# Patient Record
Sex: MALE | Race: WHITE | HISPANIC OR LATINO | Employment: OTHER | URBAN - METROPOLITAN AREA
[De-identification: names, ages, dates, MRNs, and addresses within clinical notes are randomized per-mention and may not be internally consistent; named-entity substitution may affect disease eponyms.]

---

## 2017-10-05 ENCOUNTER — TELEPHONE (OUTPATIENT)
Dept: FAMILY MEDICINE | Facility: CLINIC | Age: 62
End: 2017-10-05

## 2017-10-05 NOTE — TELEPHONE ENCOUNTER
----- Message from Roslyn Gresham sent at 10/5/2017 11:49 AM CDT -----  Contact: [phone]/ self   Patient wants to establish care with Dr Tovar and wants to her a referral for hand surgery. Patient would like to be seen sooner than the next available appointment. Please advise.

## 2017-10-09 ENCOUNTER — OFFICE VISIT (OUTPATIENT)
Dept: FAMILY MEDICINE | Facility: CLINIC | Age: 62
End: 2017-10-09
Payer: COMMERCIAL

## 2017-10-09 VITALS
OXYGEN SATURATION: 94 % | WEIGHT: 177.94 LBS | HEIGHT: 66 IN | HEART RATE: 73 BPM | SYSTOLIC BLOOD PRESSURE: 108 MMHG | TEMPERATURE: 98 F | BODY MASS INDEX: 28.6 KG/M2 | DIASTOLIC BLOOD PRESSURE: 72 MMHG

## 2017-10-09 DIAGNOSIS — E78.49 OTHER HYPERLIPIDEMIA: ICD-10-CM

## 2017-10-09 DIAGNOSIS — E11.649 TYPE 2 DIABETES MELLITUS WITH HYPOGLYCEMIA WITHOUT COMA, WITHOUT LONG-TERM CURRENT USE OF INSULIN: ICD-10-CM

## 2017-10-09 DIAGNOSIS — I10 ESSENTIAL HYPERTENSION: ICD-10-CM

## 2017-10-09 DIAGNOSIS — M65.311 TRIGGER FINGER OF RIGHT THUMB: Primary | ICD-10-CM

## 2017-10-09 PROCEDURE — 99999 PR PBB SHADOW E&M-EST. PATIENT-LVL III: CPT | Mod: PBBFAC,,, | Performed by: FAMILY MEDICINE

## 2017-10-09 PROCEDURE — 99203 OFFICE O/P NEW LOW 30 MIN: CPT | Mod: S$GLB,,, | Performed by: FAMILY MEDICINE

## 2017-10-09 RX ORDER — ATORVASTATIN CALCIUM 20 MG/1
1 TABLET, FILM COATED ORAL DAILY
Refills: 4 | COMMUNITY
Start: 2017-09-07 | End: 2017-12-29

## 2017-10-09 RX ORDER — IRBESARTAN 150 MG/1
1 TABLET ORAL DAILY
Refills: 4 | COMMUNITY
Start: 2017-09-28 | End: 2017-12-29 | Stop reason: SDUPTHER

## 2017-10-09 RX ORDER — METFORMIN HYDROCHLORIDE 500 MG/1
1 TABLET, EXTENDED RELEASE ORAL DAILY
Refills: 4 | COMMUNITY
Start: 2017-09-07 | End: 2017-12-29 | Stop reason: SDUPTHER

## 2017-10-09 RX ORDER — IBUPROFEN 800 MG/1
1 TABLET ORAL
Refills: 2 | COMMUNITY
Start: 2017-08-14 | End: 2017-12-29

## 2017-10-09 RX ORDER — ALLOPURINOL 300 MG/1
1 TABLET ORAL DAILY
Refills: 4 | COMMUNITY
Start: 2017-07-18 | End: 2017-12-29 | Stop reason: SDUPTHER

## 2017-10-09 NOTE — PROGRESS NOTES
"Subjective:       Patient ID: Trevor Johnston is a 62 y.o. male.    Chief Complaint: No chief complaint on file.    Patient is presenting with a right thumb issue. He describes it as "trigger finger" based on previous visit with a hand surgeon. He had two steroid injections performed by Dr. Joce Wallace. These have not really helped. He had a previous surgery on his left thumb. This has resolved.       Hand Pain    Incident onset: more than a year ago. The injury mechanism was repetitive motion. Pain location: right thumb. Quality: popping/stuck. The pain does not radiate. The pain is at a severity of 1/10. The pain is mild. Pertinent negatives include no chest pain, muscle weakness or tingling. The symptoms are aggravated by movement. Treatments tried: steroid injections. The treatment provided mild relief.     Review of Systems   Constitutional: Negative for fever.   HENT: Negative for voice change.    Eyes: Negative for visual disturbance.   Respiratory: Negative for cough and shortness of breath.    Cardiovascular: Negative for chest pain.   Gastrointestinal: Negative for abdominal pain, diarrhea, nausea and vomiting.   Genitourinary: Negative for difficulty urinating and dysuria.   Musculoskeletal: Positive for joint swelling. Negative for neck pain and neck stiffness.   Skin: Negative for rash.   Allergic/Immunologic: Negative for immunocompromised state.   Neurological: Negative for tingling and headaches.   Psychiatric/Behavioral: Negative for agitation.           Objective:      Physical Exam   Constitutional: He is oriented to person, place, and time. He appears well-developed and well-nourished.   Eyes: Conjunctivae are normal.   Cardiovascular: Normal rate, regular rhythm and normal heart sounds.    Pulmonary/Chest: Effort normal and breath sounds normal.   Abdominal: Soft. Bowel sounds are normal. He exhibits no distension.   Musculoskeletal:        Right hand: He exhibits tenderness. He exhibits normal " range of motion, no deformity and no swelling. Normal sensation noted. Normal strength noted.        Left hand: He exhibits normal range of motion, no tenderness and no deformity. Normal sensation noted. Normal strength noted.        Hands:  Ok sign, and lumbrical strength appear intact   Neurological: He is alert and oriented to person, place, and time.   Skin: Skin is warm and dry.   Psychiatric: He has a normal mood and affect. His speech is normal and behavior is normal.   Nursing note and vitals reviewed.      Assessment:       1. Trigger finger of right thumb    2. Essential hypertension    3. Type 2 diabetes mellitus with hypoglycemia without coma, without long-term current use of insulin    4. Other hyperlipidemia        Plan:       Trigger finger of right thumb  Has a history of trigger finger  Treated by Dr. Wallace in the past  Needs referral for surgical treatment  Has failed steroid injection treatment  Referral given  Follow up with desired PCP, Dr. Tovar as needed    Essential hypertension  Controlled today  Continue irbesartan    Other hyperlipidemia  Continue lipitor    Type 2 diabetes mellitus with hypoglycemia without coma, without long-term current use of insulin  Continue metformin XR    Warning signs discussed, patient to call with any further issues or worsening of symptoms.

## 2017-10-09 NOTE — PATIENT INSTRUCTIONS
What is Trigger Finger?  Trigger finger is an inflammation of tissue inside your finger or thumb. It is also called tenosynovitis (ten-oh-sin-oh-VY-tis). Tendons (cordlike fibers that attach muscle to bone and allow you to bend the joints) become swollen. So does the synovium (a slick membrane that allows the tendons to move easily). This makes it difficult to straighten the finger or thumb.    Causes  Repeated use of a tool with strong gripping, such as a drill or wrench, can irritate and inflame the tendons and the synovium. It is also more common in certain medical conditions such as rheumatoid arthritis, gout, and diabetes. But often the cause of trigger finger is unknown.  Inside your finger  Tendons connect muscles in your forearm to the bones in your fingers. The tendons in each finger are surrounded by a protective tendon sheath. This sheath is lined with synovium, which produces a fluid that allows the tendons to slide easily when you bend and straighten the finger. If a tendon is irritated, it becomes inflamed.  When a tendon is inflamed  When a tendon is inflamed, it causes the lining of the tendon sheath to swell and thicken. Or the tendon itself may thicken. Then the sheath pinches the tendon, and the tendon can no longer slide easily inside the sheath. When you straighten your finger, the tendon sticks or locks as it tries to squeeze back through the sheath.    Symptoms  The first sign of trigger finger may be pain where the finger or thumb joins the palm. You may also notice some swelling. As the tendon becomes more inflamed, the finger may start to catch when you try to straighten it. When the locked tendon releases, the finger jumps, as if you were releasing the trigger of a gun. This further irritates the tendon, and may set up a cycle of catching and swelling.   Date Last Reviewed: 9/28/2015  © 6851-8397 [employer]. [address] [place] [address], [place] [address]. All rights reserved.  This information is not intended as a substitute for professional medical care. Always follow your healthcare professional's instructions.

## 2017-10-09 NOTE — ASSESSMENT & PLAN NOTE
Has a history of trigger finger  Treated by Dr. Wallace in the past  Needs referral for surgical treatment  Has failed steroid injection treatment  Referral given  Follow up with desired PCP, Dr. Tovar as needed

## 2017-10-18 ENCOUNTER — TELEPHONE (OUTPATIENT)
Dept: FAMILY MEDICINE | Facility: CLINIC | Age: 62
End: 2017-10-18

## 2017-10-18 NOTE — TELEPHONE ENCOUNTER
----- Message from Ros Rodas sent at 10/18/2017 12:02 PM CDT -----  Contact: [phone] self  Patient says he needs a referral to see his eye doctor on 10/28/2017 and requested a call back. Please advise.

## 2017-10-18 NOTE — TELEPHONE ENCOUNTER
Spoke with patient. He is requesting a referral to Ophthamologist Dr. Ayan Meng  [address] [facility]   [address]  [place]  Office [phone]

## 2017-11-01 ENCOUNTER — OFFICE VISIT (OUTPATIENT)
Dept: FAMILY MEDICINE | Facility: CLINIC | Age: 62
End: 2017-11-01
Payer: COMMERCIAL

## 2017-11-01 VITALS
HEART RATE: 83 BPM | OXYGEN SATURATION: 97 % | BODY MASS INDEX: 29.02 KG/M2 | WEIGHT: 180.56 LBS | SYSTOLIC BLOOD PRESSURE: 136 MMHG | DIASTOLIC BLOOD PRESSURE: 80 MMHG | HEIGHT: 66 IN

## 2017-11-01 DIAGNOSIS — L03.032 ABSCESS OR CELLULITIS OF TOE, LEFT: Primary | ICD-10-CM

## 2017-11-01 DIAGNOSIS — I15.2 HYPERTENSION ASSOCIATED WITH DIABETES: ICD-10-CM

## 2017-11-01 DIAGNOSIS — L02.612 ABSCESS OR CELLULITIS OF TOE, LEFT: Primary | ICD-10-CM

## 2017-11-01 DIAGNOSIS — E11.59 HYPERTENSION ASSOCIATED WITH DIABETES: ICD-10-CM

## 2017-11-01 DIAGNOSIS — Z11.59 NEED FOR HEPATITIS C SCREENING TEST: ICD-10-CM

## 2017-11-01 DIAGNOSIS — E78.5 DM TYPE 2 WITH DIABETIC DYSLIPIDEMIA: ICD-10-CM

## 2017-11-01 DIAGNOSIS — E11.69 DM TYPE 2 WITH DIABETIC DYSLIPIDEMIA: ICD-10-CM

## 2017-11-01 PROBLEM — I10 ESSENTIAL HYPERTENSION: Status: RESOLVED | Noted: 2017-10-09 | Resolved: 2017-11-01

## 2017-11-01 PROBLEM — E78.49 OTHER HYPERLIPIDEMIA: Status: RESOLVED | Noted: 2017-10-09 | Resolved: 2017-11-01

## 2017-11-01 PROCEDURE — 99999 PR PBB SHADOW E&M-EST. PATIENT-LVL III: CPT | Mod: PBBFAC,,, | Performed by: FAMILY MEDICINE

## 2017-11-01 PROCEDURE — 99214 OFFICE O/P EST MOD 30 MIN: CPT | Mod: S$GLB,,, | Performed by: FAMILY MEDICINE

## 2017-11-01 PROCEDURE — 99213 OFFICE O/P EST LOW 20 MIN: CPT | Mod: PBBFAC,PO | Performed by: FAMILY MEDICINE

## 2017-11-01 RX ORDER — ONDANSETRON 4 MG/1
TABLET, FILM COATED ORAL
Refills: 0 | COMMUNITY
Start: 2017-10-31 | End: 2017-11-10

## 2017-11-01 RX ORDER — HYDROCODONE BITARTRATE AND ACETAMINOPHEN 5; 325 MG/1; MG/1
TABLET ORAL
Refills: 0 | COMMUNITY
Start: 2017-10-31 | End: 2017-11-10

## 2017-11-01 RX ORDER — SULFAMETHOXAZOLE AND TRIMETHOPRIM 800; 160 MG/1; MG/1
TABLET ORAL
Refills: 0 | COMMUNITY
Start: 2017-10-31 | End: 2017-11-01 | Stop reason: SDUPTHER

## 2017-11-01 RX ORDER — SULFAMETHOXAZOLE AND TRIMETHOPRIM 800; 160 MG/1; MG/1
TABLET ORAL
Qty: 10 TABLET | Refills: 0 | Status: SHIPPED | OUTPATIENT
Start: 2017-11-01 | End: 2017-11-10

## 2017-11-01 NOTE — PROGRESS NOTES
Subjective:       Patient ID: Trevor Johnston is a 62 y.o. male.    Chief Complaint: Cyst (left big toe x 2 days)    HPI   61 yo male pt with HTN, DM, Type 2 and hyperlipidemia presents for an urgent care visit c/o possible left big toe infection. Pt notes swelling and erythema of left big toe x 2 days. Toe is painful to the touch. No trauma to the left foot. Pt cares for his own nails. Denies fever, chills, nausea or vomiting. Pt has soaked foot in Epsom salts and applied hydrogen peroxide and Vicks Vaporub.  Pt s/p right thumb surgery yesterday. Was started on 5 days of Bactrim yesterday.  Pt would like to establish care with Dr. Berrios  Review of Systems   Constitutional: Negative for chills and fever.   Gastrointestinal: Negative for nausea and vomiting.   Skin:        See HPI       Objective:      Physical Exam   Constitutional: He is oriented to person, place, and time. He appears well-developed and well-nourished.   HENT:   Head: Normocephalic and atraumatic.   Neck: Normal range of motion. Neck supple. No JVD present. No thyromegaly present.   Cardiovascular: Normal rate, regular rhythm, normal heart sounds and intact distal pulses.    Pulmonary/Chest: Effort normal and breath sounds normal. He has no wheezes. He has no rales.   Abdominal: Soft. Bowel sounds are normal. He exhibits no mass. There is no tenderness.   Lymphadenopathy:     He has no cervical adenopathy.   Neurological: He is alert and oriented to person, place, and time.   Skin: Skin is warm and dry.   Swelling and erythema at base of left great toenail. Tenderness to palpation. Induration.   Vitals reviewed.      Assessment:       See plan  Plan:       Trevor was seen today for cyst.    Diagnoses and all orders for this visit:    Abscess or cellulitis of toe, left  -     sulfamethoxazole-trimethoprim 800-160mg (BACTRIM DS) 800-160 mg Tab; TK 1 T PO BID WITH FOOD for 10 days  -     Warm soaks. Apply Neosporin.  -     F/U in 1 week with   Agustina    Hypertension associated with diabetes: Stable  -     CBC auto differential; Future  -     Hemoglobin A1c; Future    BMI 29.0-29.9,adult    DM type 2 with diabetic dyslipidemia: Stable  -     Lipid panel; Future  -     Comprehensive metabolic panel; Future    Need for hepatitis C screening test  -     Hepatitis C antibody; Future    F/U in 1 week with Dr. Berrios.

## 2017-11-03 DIAGNOSIS — Z12.11 COLON CANCER SCREENING: ICD-10-CM

## 2017-11-06 ENCOUNTER — TELEPHONE (OUTPATIENT)
Dept: FAMILY MEDICINE | Facility: CLINIC | Age: 62
End: 2017-11-06

## 2017-11-06 NOTE — TELEPHONE ENCOUNTER
----- Message from Chema Berrios DO sent at 11/6/2017  2:26 PM CST -----  Contact: [phone]  Please contact patient and let me know what this is regarding. I last saw him for a trigger finger which is an outpatient procedure.       ----- Message -----  From: Christine Prado MA  Sent: 11/6/2017   2:20 PM  To: Chema Berrios DO    Pls be adv   ----- Message -----  From: Jackie Dickson  Sent: 11/6/2017   2:14 PM  To: Agustina Bear Staff    Patient states he is having surgery in the morning and his referral was not sent, patient is requesting to speak with you asap

## 2017-11-06 NOTE — TELEPHONE ENCOUNTER
Spoke with patient and informed no surg clearance was done in order for patient to have laser eye surgery. Informed patient he wanted a referral to outside ophthalmology and did not report needing any surg clr. Informed patient he has appt already scheduled for Friday and labs. Instructed to keep appt and ekg can be done day of appt with Dr. Berrios. Patient voices understanding.

## 2017-11-06 NOTE — TELEPHONE ENCOUNTER
----- Message from Radha Kelley sent at 11/3/2017  5:15 PM CDT -----  Contact: [phone]/self  Patient requesting to speak with you regarding prescription sulfamethoxazole-trimethoprim 800-160mg (BACTRIM DS) 800-160 mg Tab. Please advise.

## 2017-11-06 NOTE — TELEPHONE ENCOUNTER
Pt notes that he had problems getting his Bactrim from the pharmacy. Pt has received his Bactrim. Pt notes that he lanced the toe and has been doing soaks. Pt states that his toe has been better.

## 2017-11-08 ENCOUNTER — LAB VISIT (OUTPATIENT)
Dept: LAB | Facility: HOSPITAL | Age: 62
End: 2017-11-08
Attending: FAMILY MEDICINE
Payer: COMMERCIAL

## 2017-11-08 DIAGNOSIS — E11.59 HYPERTENSION ASSOCIATED WITH DIABETES: ICD-10-CM

## 2017-11-08 DIAGNOSIS — I15.2 HYPERTENSION ASSOCIATED WITH DIABETES: ICD-10-CM

## 2017-11-08 DIAGNOSIS — Z11.59 NEED FOR HEPATITIS C SCREENING TEST: ICD-10-CM

## 2017-11-08 DIAGNOSIS — E78.5 DM TYPE 2 WITH DIABETIC DYSLIPIDEMIA: ICD-10-CM

## 2017-11-08 DIAGNOSIS — E11.69 DM TYPE 2 WITH DIABETIC DYSLIPIDEMIA: ICD-10-CM

## 2017-11-08 LAB
ALBUMIN SERPL BCP-MCNC: 4 G/DL
ALP SERPL-CCNC: 104 U/L
ALT SERPL W/O P-5'-P-CCNC: 52 U/L
ANION GAP SERPL CALC-SCNC: 7 MMOL/L
AST SERPL-CCNC: 28 U/L
BASOPHILS # BLD AUTO: 0.05 K/UL
BASOPHILS NFR BLD: 0.9 %
BILIRUB SERPL-MCNC: 0.4 MG/DL
BUN SERPL-MCNC: 11 MG/DL
CALCIUM SERPL-MCNC: 9.7 MG/DL
CHLORIDE SERPL-SCNC: 105 MMOL/L
CHOLEST SERPL-MCNC: 135 MG/DL
CHOLEST/HDLC SERPL: 3.8 {RATIO}
CO2 SERPL-SCNC: 27 MMOL/L
CREAT SERPL-MCNC: 1.1 MG/DL
DIFFERENTIAL METHOD: ABNORMAL
EOSINOPHIL # BLD AUTO: 0.2 K/UL
EOSINOPHIL NFR BLD: 3.1 %
ERYTHROCYTE [DISTWIDTH] IN BLOOD BY AUTOMATED COUNT: 12.8 %
EST. GFR  (AFRICAN AMERICAN): >60 ML/MIN/1.73 M^2
EST. GFR  (NON AFRICAN AMERICAN): >60 ML/MIN/1.73 M^2
ESTIMATED AVG GLUCOSE: 126 MG/DL
GLUCOSE SERPL-MCNC: 120 MG/DL
HBA1C MFR BLD HPLC: 6 %
HCT VFR BLD AUTO: 41.5 %
HDLC SERPL-MCNC: 36 MG/DL
HDLC SERPL: 26.7 %
HGB BLD-MCNC: 13.8 G/DL
IMM GRANULOCYTES # BLD AUTO: 0.02 K/UL
IMM GRANULOCYTES NFR BLD AUTO: 0.3 %
LDLC SERPL CALC-MCNC: 66.4 MG/DL
LYMPHOCYTES # BLD AUTO: 2.3 K/UL
LYMPHOCYTES NFR BLD: 39.3 %
MCH RBC QN AUTO: 30.1 PG
MCHC RBC AUTO-ENTMCNC: 33.3 G/DL
MCV RBC AUTO: 91 FL
MONOCYTES # BLD AUTO: 0.6 K/UL
MONOCYTES NFR BLD: 9.6 %
NEUTROPHILS # BLD AUTO: 2.7 K/UL
NEUTROPHILS NFR BLD: 46.8 %
NONHDLC SERPL-MCNC: 99 MG/DL
NRBC BLD-RTO: 0 /100 WBC
PLATELET # BLD AUTO: 234 K/UL
PMV BLD AUTO: 9.7 FL
POTASSIUM SERPL-SCNC: 4.6 MMOL/L
PROT SERPL-MCNC: 7.8 G/DL
RBC # BLD AUTO: 4.58 M/UL
SODIUM SERPL-SCNC: 139 MMOL/L
TRIGL SERPL-MCNC: 163 MG/DL
WBC # BLD AUTO: 5.83 K/UL

## 2017-11-08 PROCEDURE — 80053 COMPREHEN METABOLIC PANEL: CPT

## 2017-11-08 PROCEDURE — 36415 COLL VENOUS BLD VENIPUNCTURE: CPT | Mod: PO

## 2017-11-08 PROCEDURE — 86803 HEPATITIS C AB TEST: CPT

## 2017-11-08 PROCEDURE — 83036 HEMOGLOBIN GLYCOSYLATED A1C: CPT

## 2017-11-08 PROCEDURE — 85025 COMPLETE CBC W/AUTO DIFF WBC: CPT

## 2017-11-08 PROCEDURE — 80061 LIPID PANEL: CPT

## 2017-11-09 LAB — HCV AB SERPL QL IA: NEGATIVE

## 2017-11-10 ENCOUNTER — OFFICE VISIT (OUTPATIENT)
Dept: FAMILY MEDICINE | Facility: CLINIC | Age: 62
End: 2017-11-10
Payer: COMMERCIAL

## 2017-11-10 VITALS
TEMPERATURE: 98 F | WEIGHT: 178 LBS | DIASTOLIC BLOOD PRESSURE: 70 MMHG | HEART RATE: 90 BPM | OXYGEN SATURATION: 99 % | BODY MASS INDEX: 28.61 KG/M2 | HEIGHT: 66 IN | SYSTOLIC BLOOD PRESSURE: 118 MMHG

## 2017-11-10 DIAGNOSIS — H35.30 MACULAR DEGENERATION: ICD-10-CM

## 2017-11-10 DIAGNOSIS — D64.9 MILD ANEMIA: ICD-10-CM

## 2017-11-10 DIAGNOSIS — R79.89 ELEVATED LFTS: ICD-10-CM

## 2017-11-10 DIAGNOSIS — E11.69 DM TYPE 2 WITH DIABETIC DYSLIPIDEMIA: ICD-10-CM

## 2017-11-10 DIAGNOSIS — E78.5 DM TYPE 2 WITH DIABETIC DYSLIPIDEMIA: ICD-10-CM

## 2017-11-10 DIAGNOSIS — Z01.818 PREOPERATIVE CLEARANCE: Primary | ICD-10-CM

## 2017-11-10 PROCEDURE — 99999 PR PBB SHADOW E&M-EST. PATIENT-LVL III: CPT | Mod: PBBFAC,,, | Performed by: FAMILY MEDICINE

## 2017-11-10 PROCEDURE — 93005 ELECTROCARDIOGRAM TRACING: CPT | Mod: S$GLB,,, | Performed by: FAMILY MEDICINE

## 2017-11-10 PROCEDURE — 93005 ELECTROCARDIOGRAM TRACING: CPT | Mod: PBBFAC,PO | Performed by: FAMILY MEDICINE

## 2017-11-10 PROCEDURE — 99213 OFFICE O/P EST LOW 20 MIN: CPT | Mod: PBBFAC,PO,25 | Performed by: FAMILY MEDICINE

## 2017-11-10 PROCEDURE — 93010 ELECTROCARDIOGRAM REPORT: CPT | Mod: S$GLB,,, | Performed by: INTERNAL MEDICINE

## 2017-11-10 PROCEDURE — 99215 OFFICE O/P EST HI 40 MIN: CPT | Mod: S$GLB,,, | Performed by: FAMILY MEDICINE

## 2017-11-10 NOTE — PROGRESS NOTES
" Subjective:      Trevor Johnston is a 62 y.o. male who presents to the office today for a preoperative consultation at the request of surgeon Dr. Meng who plans on performing eye injections. Planned anesthesia: local. The patient has the following known anesthesia issues: none. Patients bleeding risk: no recent abnormal bleeding. Patient does not have objections to receiving blood products if needed.    The following portions of the patient's history were reviewed and updated as appropriate: allergies, current medications, past family history, past medical history, past social history, past surgical history and problem list.    Review of Systems  A comprehensive review of systems was negative.      Objective:      /70 (BP Location: Left arm, Patient Position: Sitting, BP Method: Medium (Manual))   Pulse 90   Temp 97.5 °F (36.4 °C)   Ht 5' 6" (1.676 m)   Wt 80.7 kg (178 lb)   SpO2 99%   BMI 28.73 kg/m²     General Appearance:    Alert, cooperative, no distress, appears stated age   Head:    Normocephalic, without obvious abnormality, atraumatic   Eyes:    PERRL, conjunctiva/corneas clear       Ears:    Normal TM's and external ear canals, both ears   Nose:   Congestion noted   Throat:   Lips, mucosa, and tongue normal; teeth and gums normal   Neck:   Supple, symmetrical, trachea midline, no adenopathy;        thyroid:    Back:     Symmetric, no curvature, ROM normal, no CVA tenderness   Lungs:     Clear to auscultation bilaterally, respirations unlabored   Chest wall:    No tenderness or deformity   Heart:    Regular rate and rhythm, S1 and S2 normal, no murmur, rub   or gallop   Abdomen:     Soft, non-tender, bowel sounds active all four quadrants,     no masses, no organomegaly   Extremities:   Extremities normal, atraumatic, no cyanosis or edema   Pulses:   2+ and symmetric all extremities   Skin:   Skin color, texture, turgor normal, no rashes or lesions   Lymph nodes:   Cervical, supraclavicular, " and axillary nodes normal   Neurologic:   CNII-XII intact. Normal strength, sensation and reflexes       throughout   Cardiographics  ECG: no prior ECG, results pending  Echocardiogram: not done    Imaging  Chest x-ray: not done     Lab Review   Results for orders placed or performed in visit on 11/08/17   Lipid panel   Result Value Ref Range    Cholesterol 135 120 - 199 mg/dL    Triglycerides 163 (H) 30 - 150 mg/dL    HDL 36 (L) 40 - 75 mg/dL    LDL Cholesterol 66.4 63.0 - 159.0 mg/dL    HDL/Chol Ratio 26.7 20.0 - 50.0 %    Total Cholesterol/HDL Ratio 3.8 2.0 - 5.0    Non-HDL Cholesterol 99 mg/dL   Comprehensive metabolic panel   Result Value Ref Range    Sodium 139 136 - 145 mmol/L    Potassium 4.6 3.5 - 5.1 mmol/L    Chloride 105 95 - 110 mmol/L    CO2 27 23 - 29 mmol/L    Glucose 120 (H) 70 - 110 mg/dL    BUN, Bld 11 8 - 23 mg/dL    Creatinine 1.1 0.5 - 1.4 mg/dL    Calcium 9.7 8.7 - 10.5 mg/dL    Total Protein 7.8 6.0 - 8.4 g/dL    Albumin 4.0 3.5 - 5.2 g/dL    Total Bilirubin 0.4 0.1 - 1.0 mg/dL    Alkaline Phosphatase 104 55 - 135 U/L    AST 28 10 - 40 U/L    ALT 52 (H) 10 - 44 U/L    Anion Gap 7 (L) 8 - 16 mmol/L    eGFR if African American >60.0 >60 mL/min/1.73 m^2    eGFR if non African American >60.0 >60 mL/min/1.73 m^2   CBC auto differential   Result Value Ref Range    WBC 5.83 3.90 - 12.70 K/uL    RBC 4.58 (L) 4.60 - 6.20 M/uL    Hemoglobin 13.8 (L) 14.0 - 18.0 g/dL    Hematocrit 41.5 40.0 - 54.0 %    MCV 91 82 - 98 fL    MCH 30.1 27.0 - 31.0 pg    MCHC 33.3 32.0 - 36.0 g/dL    RDW 12.8 11.5 - 14.5 %    Platelets 234 150 - 350 K/uL    MPV 9.7 9.2 - 12.9 fL    Immature Granulocytes 0.3 0.0 - 0.5 %    Gran # 2.7 1.8 - 7.7 K/uL    Immature Grans (Abs) 0.02 0.00 - 0.04 K/uL    Lymph # 2.3 1.0 - 4.8 K/uL    Mono # 0.6 0.3 - 1.0 K/uL    Eos # 0.2 0.0 - 0.5 K/uL    Baso # 0.05 0.00 - 0.20 K/uL    nRBC 0 0 /100 WBC    Gran% 46.8 38.0 - 73.0 %    Lymph% 39.3 18.0 - 48.0 %    Mono% 9.6 4.0 - 15.0 %     Eosinophil% 3.1 0.0 - 8.0 %    Basophil% 0.9 0.0 - 1.9 %    Differential Method Automated    Hemoglobin A1c   Result Value Ref Range    Hemoglobin A1C 6.0 (H) 4.0 - 5.6 %    Estimated Avg Glucose 126 68 - 131 mg/dL   Hepatitis C antibody   Result Value Ref Range    Hepatitis C Ab Negative         Assessment:        62 y.o. male with planned surgery as above.    Known risk factors for perioperative complications: None    Current medications which may produce withdrawal symptoms if withheld perioperatively: none       Plan:      1. Preoperative workup as follows ECG, hemoglobin, hematocrit.  2. Change in medication regimen before surgery: none, continue medication regimen including morning of surgery, with sip of water.  3. Follow up with PCP in 3-4 months.     Preoperative clearance  -     EKG 12-LEAD; Future    Macular degeneration  -     Ambulatory Referral to Ophthalmology    Elevated LFTs  -     Comprehensive metabolic panel; Future; Expected date: 02/10/2018    Mild anemia  -     CBC auto differential; Future; Expected date: 02/10/2018    DM type 2 with diabetic dyslipidemia  -     Hemoglobin A1c; Future; Expected date: 02/10/2018      40 minutes spent with patient, of which >50% was spent on counseling and coordination of care.

## 2017-11-10 NOTE — LETTER
November 10, 2017    Trveor Johnston  [address]  [place] [address]             [facility]  [address]  [place] [address]  Phone: [phone]  Fax: [phone] Dear Mr. Johnston:    Below are the results from your recent visit:    Results for orders placed or performed in visit on 11/08/17   Lipid panel   Result Value Ref Range    Cholesterol 135 120 - 199 mg/dL    Triglycerides 163 (H) 30 - 150 mg/dL    HDL 36 (L) 40 - 75 mg/dL    LDL Cholesterol 66.4 63.0 - 159.0 mg/dL    HDL/Chol Ratio 26.7 20.0 - 50.0 %    Total Cholesterol/HDL Ratio 3.8 2.0 - 5.0    Non-HDL Cholesterol 99 mg/dL   Comprehensive metabolic panel   Result Value Ref Range    Sodium 139 136 - 145 mmol/L    Potassium 4.6 3.5 - 5.1 mmol/L    Chloride 105 95 - 110 mmol/L    CO2 27 23 - 29 mmol/L    Glucose 120 (H) 70 - 110 mg/dL    BUN, Bld 11 8 - 23 mg/dL    Creatinine 1.1 0.5 - 1.4 mg/dL    Calcium 9.7 8.7 - 10.5 mg/dL    Total Protein 7.8 6.0 - 8.4 g/dL    Albumin 4.0 3.5 - 5.2 g/dL    Total Bilirubin 0.4 0.1 - 1.0 mg/dL    Alkaline Phosphatase 104 55 - 135 U/L    AST 28 10 - 40 U/L    ALT 52 (H) 10 - 44 U/L    Anion Gap 7 (L) 8 - 16 mmol/L    eGFR if African American >60.0 >60 mL/min/1.73 m^2    eGFR if non African American >60.0 >60 mL/min/1.73 m^2   CBC auto differential   Result Value Ref Range    WBC 5.83 3.90 - 12.70 K/uL    RBC 4.58 (L) 4.60 - 6.20 M/uL    Hemoglobin 13.8 (L) 14.0 - 18.0 g/dL    Hematocrit 41.5 40.0 - 54.0 %    MCV 91 82 - 98 fL    MCH 30.1 27.0 - 31.0 pg    MCHC 33.3 32.0 - 36.0 g/dL    RDW 12.8 11.5 - 14.5 %    Platelets 234 150 - 350 K/uL    MPV 9.7 9.2 - 12.9 fL    Immature Granulocytes 0.3 0.0 - 0.5 %    Gran # 2.7 1.8 - 7.7 K/uL    Immature Grans (Abs) 0.02 0.00 - 0.04 K/uL    Lymph # 2.3 1.0 - 4.8 K/uL    Mono # 0.6 0.3 - 1.0 K/uL    Eos # 0.2 0.0 - 0.5 K/uL    Baso # 0.05 0.00 - 0.20 K/uL    nRBC 0 0 /100 WBC    Gran% 46.8 38.0 - 73.0 %    Lymph% 39.3 18.0 - 48.0 %    Mono% 9.6 4.0 -  15.0 %    Eosinophil% 3.1 0.0 - 8.0 %    Basophil% 0.9 0.0 - 1.9 %    Differential Method Automated    Hemoglobin A1c   Result Value Ref Range    Hemoglobin A1C 6.0 (H) 4.0 - 5.6 %    Estimated Avg Glucose 126 68 - 131 mg/dL   Hepatitis C antibody   Result Value Ref Range    Hepatitis C Ab Negative      Sincerely,    Chema Berrios DO

## 2017-11-13 ENCOUNTER — TELEPHONE (OUTPATIENT)
Dept: FAMILY MEDICINE | Facility: CLINIC | Age: 62
End: 2017-11-13

## 2017-12-29 ENCOUNTER — CLINICAL SUPPORT (OUTPATIENT)
Dept: FAMILY MEDICINE | Facility: CLINIC | Age: 62
End: 2017-12-29
Payer: OTHER GOVERNMENT

## 2017-12-29 ENCOUNTER — OFFICE VISIT (OUTPATIENT)
Dept: FAMILY MEDICINE | Facility: CLINIC | Age: 62
End: 2017-12-29
Payer: OTHER GOVERNMENT

## 2017-12-29 VITALS
TEMPERATURE: 99 F | SYSTOLIC BLOOD PRESSURE: 112 MMHG | HEIGHT: 66 IN | DIASTOLIC BLOOD PRESSURE: 80 MMHG | HEART RATE: 83 BPM | BODY MASS INDEX: 29.41 KG/M2 | OXYGEN SATURATION: 97 % | WEIGHT: 183 LBS

## 2017-12-29 DIAGNOSIS — N20.0 KIDNEY STONES: ICD-10-CM

## 2017-12-29 DIAGNOSIS — E78.5 HYPERLIPIDEMIA, UNSPECIFIED HYPERLIPIDEMIA TYPE: ICD-10-CM

## 2017-12-29 DIAGNOSIS — E11.59 HYPERTENSION ASSOCIATED WITH DIABETES: ICD-10-CM

## 2017-12-29 DIAGNOSIS — R79.89 ELEVATED LFTS: ICD-10-CM

## 2017-12-29 DIAGNOSIS — E11.65 TYPE 2 DIABETES MELLITUS WITH HYPERGLYCEMIA, WITHOUT LONG-TERM CURRENT USE OF INSULIN: ICD-10-CM

## 2017-12-29 DIAGNOSIS — E11.65 TYPE 2 DIABETES MELLITUS WITH HYPERGLYCEMIA, WITHOUT LONG-TERM CURRENT USE OF INSULIN: Primary | ICD-10-CM

## 2017-12-29 DIAGNOSIS — D64.9 NORMOCYTIC ANEMIA: ICD-10-CM

## 2017-12-29 DIAGNOSIS — I15.2 HYPERTENSION ASSOCIATED WITH DIABETES: ICD-10-CM

## 2017-12-29 PROCEDURE — 99214 OFFICE O/P EST MOD 30 MIN: CPT | Mod: S$PBB,,, | Performed by: FAMILY MEDICINE

## 2017-12-29 PROCEDURE — 92250 FUNDUS PHOTOGRAPHY W/I&R: CPT | Mod: PBBFAC,PO | Performed by: FAMILY MEDICINE

## 2017-12-29 PROCEDURE — 92250 FUNDUS PHOTOGRAPHY W/I&R: CPT | Mod: 50,PBBFAC,PO

## 2017-12-29 PROCEDURE — 99214 OFFICE O/P EST MOD 30 MIN: CPT | Mod: PBBFAC,PO | Performed by: FAMILY MEDICINE

## 2017-12-29 PROCEDURE — 99999 PR PBB SHADOW E&M-EST. PATIENT-LVL IV: CPT | Mod: PBBFAC,,, | Performed by: FAMILY MEDICINE

## 2017-12-29 RX ORDER — ATORVASTATIN CALCIUM 40 MG/1
40 TABLET, FILM COATED ORAL DAILY
Qty: 90 TABLET | Refills: 1 | Status: SHIPPED | OUTPATIENT
Start: 2017-12-29 | End: 2018-01-09 | Stop reason: SDUPTHER

## 2017-12-29 RX ORDER — IRBESARTAN 150 MG/1
150 TABLET ORAL DAILY
Qty: 90 TABLET | Refills: 1 | Status: SHIPPED | OUTPATIENT
Start: 2017-12-29 | End: 2018-01-09 | Stop reason: SDUPTHER

## 2017-12-29 RX ORDER — METFORMIN HYDROCHLORIDE 500 MG/1
500 TABLET, EXTENDED RELEASE ORAL DAILY
Qty: 90 TABLET | Refills: 1 | Status: SHIPPED | OUTPATIENT
Start: 2017-12-29 | End: 2018-01-09 | Stop reason: SDUPTHER

## 2017-12-29 RX ORDER — IBUPROFEN 600 MG/1
600 TABLET ORAL
Qty: 60 TABLET | Refills: 0 | Status: SHIPPED | OUTPATIENT
Start: 2017-12-29 | End: 2018-01-08

## 2017-12-29 RX ORDER — ALLOPURINOL 300 MG/1
300 TABLET ORAL DAILY
Qty: 90 TABLET | Refills: 1 | Status: SHIPPED | OUTPATIENT
Start: 2017-12-29 | End: 2018-01-09 | Stop reason: SDUPTHER

## 2017-12-29 NOTE — PATIENT INSTRUCTIONS
Lifestyle Changes to Control Cholesterol  You can control your cholesterol through diet, exercise, weight management, quitting smoking, stress management, and taking your medicines right. These things can also lower your risk for cardiovascular disease.    Eating healthy  Your healthcare provider will give you information on diet changes you may need to make. Your provider may recommend that you see a registered dietitian for help with diet changes. Changes may include:  · Cutting back on the amount of fat and cholesterol in your meals  · Eating less salt (sodium). This is especially important if you have high blood pressure.  · Eating more fresh vegetables and fruits  · Eating lean proteins such as fish, poultry, beans, and peas  · Eating less red meat and processed meats  · Using low-fat dairy products  · Using vegetable and nut oils in limited amounts  · Limiting how many sweets and processed foods like chips, cookies, and baked goods that you eat   · Limiting how many sugar-sweetened beverages you drink  · Limiting how often you eat out  Getting exercise  Regular exercise is a good way to help your body control cholesterol. Regular exercise can help in many ways. It can:  · Raise your good cholesterol  · Help lower your bad cholesterol  · Let blood flow better through your body  · Give more oxygen to your muscles and tissues  · Help you manage your weight  · Help your heart pump better  · Lower your blood pressure  Your healthcare provider may recommend that you get more physical activity if you haven't been active. Your provider may recommend that you get moderate to vigorous physical activity for at least 40 minutes each day. You should do this for at least 3 to 4 days each week. A few examples of moderate to vigorous activity are:  · Walking at a brisk pace. This is about 3 to 4 miles per hour.  · Jogging or running  · Swimming or water aerobics  · Hiking  · Dancing  · Martial arts  · Tennis  · Riding a  bicycle or stationary bike  · Dancing  Managing your weight  If you are overweight or obese, your healthcare provider will work with you to help you lose weight and lower your BMI (body mass index). Making diet changes and getting more physical activity can help. Changing your diet will help you lose weight more easily than adding exercise.  Quitting smoking  Smoking and other tobacco use can raise cholesterol and make it harder to control. Quitting is tough. But millions of people have given up tobacco for good. You can quit, too! Think about some of the reasons below to quit smoking. Do any of them make you think twice about your smoking habit?  Stop smoking because it:  · Keeps your cholesterol high, even if you make all the other changes youre supposed to  · Damages your body. It especially harms your heart, lungs, skin, and blood vessels.  · Makes you more likely to have a heart attack (acute myocardial infarction), stroke, or cancer  · Stains your teeth  · Makes your skin, clothes, and breath smell bad  · Costs a lot of money  Controlling stress   Learn ways to control stress. This will help you deal with stress in your home and work life. Controlling stress can greatly lower your risk of getting cardiovascular disease.  Making the most of medicines  Healthy eating and exercise are a good start to keeping your cholesterol down. But you may need some extra help from medicine. If your doctor prescribes medicine, be sure to take it exactly as directed. Remember:  · Tell your healthcare provider about all other medicines you take. This includes vitamins and herbs.  · Tell your healthcare provider if you have any side effects after starting to take a medicine. Examples of side effects to watch for include muscle aches, weakness, blurred vision, rust-colored urine, yellowing of eyes or skin (jaundice), and headache.  · Dont skip a dose or stop taking your medicine because you feel better or because  your cholesterol numbers go down. Never stop taking your medicine unless your healthcare provider has told you its OK.  · Ask your healthcare provider if you have any questions about your medicines.  High risk groups  Some people may need to take medicines called statins to control their cholesterol. This is in addition to eating a healthy diet and getting regular exercise.  Statins can help you stay healthy. They can also help prevent a heart attack or stroke. You may need to take a statin if you are in one of these groups:  · Adults who have had a heart attack or stroke. Or adults who have had peripheral vascular disease, a ministroke (transient ischemic attack), or stable or unstable angina. This group also includes people who have had a procedure to restore blood flow through a blocked artery. These procedures include percutaneous coronary intervention, angioplasty, stent, and open-heart bypass surgery.  · Adults who have diabetes. Or adults who are at higher risk of having a heart attack or stroke and have an LDL cholesterol level of 70 to 189 mg/dL  · Adults who are 21 years old or older and have an LDL cholesterol level of 190 mg/dL or higher.  If you are in a high-risk group, talk with your healthcare provider about your treatment goals. Make sure you understand why these goals are important, based on your own health history and your family history of heart disease or high cholesterol.  Make a plan to have regular cholesterol checks. You may need to fast before getting this test. Also ask your provider about any side effects your medicines may cause. Let your provider know about any side effects you have. You may need to take more than one medicine to reach the cholesterol goals that you and your provider decide on.  Date Last Reviewed: 10/1/2016  © 3602-6749 [employer]. [address] [place] [address], [place] [address]. All rights reserved. This information is not intended as a substitute for  professional medical care. Always follow your healthcare professional's instructions.        Understanding Fat and Cholesterol  Too much cholesterol in your blood can lead to many problems such as blocked arteries. This can lead to heart attack and stroke. One of the best ways to manage heart and blood vessel disease is to lower your blood cholesterol. Planning meals that are low in saturated fat and cholesterol helps reduce the level of cholesterol in your blood. Below are eating tips to help lower your blood cholesterol levels.  Eat less fat  A healthy goal is to have less than 25% to 35% of your daily calories come from fat. Instead of fats, eat more fruits, whole-grains, and vegetables. This also helps control your weight, and can even reduce your risk for some cancers. There are different kinds of fats in foods. Fats can be saturated, unsaturated, or trans fats. The best fats to choose are unsaturated fats. But fats are high in calories, so eat even unsaturated fats sparingly.  Limit foods high in saturated fats  Saturated fats come from animals and certain plants (such as coconut and palm). Eating too much saturated fat can raise your blood cholesterol levels and make your artery problems worse. Your goal is to eat less saturated fat. Below are some examples of foods that contain lots of saturated fat:  · Fatty cuts of meat (lamb, ham, beef)  · Many pastries, cakes, cookies, and candies  · Cream, ice cream, sour cream, cheese, and butter, and foods made with them  · Sauces made with butter or cream  · Salad dressings with saturated fats  · Foods that contain palm or coconut oil  Choose unsaturated fats  Unsaturated fats are usually liquid at room temperature. They are better choices for your heart than saturated fat. There are two types of unsaturated fats: polyunsaturated fat and monounsaturated fat. Aim to replace saturated fats with polyunsaturated or monounsaturated fats.  · Polyunsaturated fats are found  in corn oil, safflower oil, sunflower oil, and other vegetable oils.  · Monounsaturated fats are found in olive oil, canola oil, and peanut oil. Some margarines and spreads are now made with these oils, too. Avocados are also high in monounsaturated fat.  Of all fats, monounsaturated fats are the least harmful to your heart.  Avoid trans fats  Like saturated fats, trans fats have been linked to heart disease. Even a small amount can harm your health. Trans fats are found in liquid oils that have been changed to be solid at room temperature. Margarine, which is often made from vegetable oil, is one example. Vegetable shortening is another. Trans fats are often found in packaged goods. Check ingredients for the words hydrogenated or partially hydrogenated. They mean the foods contain trans fat.  What about triglycerides?  Triglycerides are a type of fat in your blood. Like cholesterol, high levels of triglycerides can lead to blocked arteries. High triglyceride levels can be reduced 20% to 50%  by limiting added sugars in your diet, susbstituting healthier fats for saturated and trans fats, getting more physical activity, and losing weight if your are overweight. You may also be advised to avoid or limi alcohol.    Reading food labels  Luckily, most foods now have labels giving you the facts about what youre eating. Reading food labels helps you make healthy choices. Look for the words highlighted below.  · Serving Size. This is the amount of food in 1 serving. If you eat larger portions, be sure to count more of everything: fat, calories, and cholesterol.  · Total Fat. Tells you how many grams (g) of fat are in 1 serving.  · Calories from Fat. This tells you the total number of calories from fat in 1 serving (there are 9 calories per gram of fat). Look for foods with the fewest calories from fat.  · Saturated Fat. Tells you how many grams (g) of saturated fat are in 1 serving.  · Trans Fat. Tells how many grams  (g) of trans fat are in 1 serving.  · Cholesterol. Tells you how many milligrams (mg) of cholesterol are in 1 serving.  Date Last Reviewed: 5/11/2015 © 2000-2017 [employer]. [address] [place] [address], [place] [address]. All rights reserved. This information is not intended as a substitute for professional medical care. Always follow your healthcare professional's instructions.        4 Steps for Eating Healthier  Changing the way you eat can improve your health. It can lower your cholesterol and blood pressure, and help you stay at a healthy weight. Your diet doesnt have to be bland and boring to be healthy. Just watch your calories and follow these steps:    1. Eat fewer unhealthy fats  · Choose more fish and lean meats instead of fatty cuts of meat.  · Skip butter and lard, and use less margarine.  · Pass on foods that have palm, coconut, or hydrogenated oils.  · Eat fewer high-fat dairy foods like cheese, ice cream, and whole milk.  · Get a heart-healthy cookbook and try some low-fat recipes.  2. Go light on salt  · Keep the saltshaker off the table.  · Limit high-salt ingredients, such as soy sauce, bouillon, and garlic salt.  · Instead of adding salt when cooking, season your food with herbs and flavorings. Try lemon, garlic, and onion.  · Limit convenience foods, such as boxed or canned foods and restaurant food.  · Read food labels and choose lower-sodium options.  3. Limit sugar  · Pause before you add sugars to pancakes, cereal, coffee, or tea. This includes white and brown table sugar, syrup, honey, and molasses. Cut your usual amount by half.  · Use non-sugar sweeteners. Stevia, aspartame, and sucralose can satisfy a sweet tooth without adding calories.  · Swap out sugar-filled soda and other drinks. Buy sugar-free or low-calorie beverages. Remember water is always the best choice.  · Read labels and choose foods with less added sugar. Keep in mind that dairy foods and foods with fruit will  have some natural sugar.  · Cut the sugar in recipes by 1/3 to 1/2. Boost the flavor with extracts like almond, vanilla, or orange. Or add spices such as cinnamon or nutmeg.  4. Eat more fiber  · Eat fresh fruits and vegetables every day.  · Boost your diet with whole grains. Go for oats, whole-grain rice, and bran.  · Add beans and lentils to your meals.  · Drink more water to match your fiber increase. This is to help prevent constipation.  Date Last Reviewed: 5/11/2015  © 0777-5722 [employer]. [address] [place] [address], [place] [address]. All rights reserved. This information is not intended as a substitute for professional medical care. Always follow your healthcare professional's instructions.

## 2017-12-29 NOTE — PROGRESS NOTES
Subjective:       Patient ID: Trevor Johnston is a 62 y.o. male.    Chief Complaint: Follow-up (Refills)    62 year old male presents today for follow up on his diabetes, medications, lab results, and medication refills.     His weight has increased slightly over the holidays. He has never seen a diabetic educator. Eye exam is to be done today. Foot exam done today.     BP controlled.     Statin increased to HIS based on HTN and DM.       Diabetes   He presents for his follow-up diabetic visit. He has type 2 diabetes mellitus. The initial diagnosis of diabetes was made 1 year ago. His disease course has been stable. There are no hypoglycemic associated symptoms. Pertinent negatives for hypoglycemia include no headaches or sweats. Pertinent negatives for diabetes include no blurred vision, no chest pain, no foot paresthesias, no foot ulcerations, no polydipsia, no polyphagia, no polyuria, no visual change, no weakness and no weight loss. There are no hypoglycemic complications. Symptoms are stable. Risk factors for coronary artery disease include diabetes mellitus, dyslipidemia, male sex and obesity. Current diabetic treatment includes oral agent (monotherapy). He is compliant with treatment all of the time. When asked about meal planning, he reported none. He has not had a previous visit with a dietitian. He rarely participates in exercise. An ACE inhibitor/angiotensin II receptor blocker is being taken. He does not see a podiatrist.Eye exam is not current.   Hypertension   This is a chronic problem. The current episode started more than 1 year ago. The problem is unchanged. The problem is controlled. Pertinent negatives include no blurred vision, chest pain, headaches, neck pain, palpitations, peripheral edema, shortness of breath or sweats. There are no associated agents to hypertension. Risk factors for coronary artery disease include diabetes mellitus, dyslipidemia, male gender and obesity. Past treatments  include angiotensin blockers. The current treatment provides significant improvement. There are no compliance problems.    Hyperlipidemia   This is a chronic problem. The current episode started more than 1 year ago. The problem is controlled. Pertinent negatives include no chest pain or shortness of breath. Current antihyperlipidemic treatment includes statins. The current treatment provides significant improvement of lipids. There are no compliance problems.      Review of Systems   Constitutional: Negative for weight loss.   Eyes: Negative for blurred vision.   Respiratory: Negative for shortness of breath.    Cardiovascular: Negative for chest pain and palpitations.   Endocrine: Negative for polydipsia, polyphagia and polyuria.   Musculoskeletal: Negative for neck pain.   Neurological: Negative for weakness and headaches.         Results for orders placed or performed in visit on 11/08/17   Lipid panel   Result Value Ref Range    Cholesterol 135 120 - 199 mg/dL    Triglycerides 163 (H) 30 - 150 mg/dL    HDL 36 (L) 40 - 75 mg/dL    LDL Cholesterol 66.4 63.0 - 159.0 mg/dL    HDL/Chol Ratio 26.7 20.0 - 50.0 %    Total Cholesterol/HDL Ratio 3.8 2.0 - 5.0    Non-HDL Cholesterol 99 mg/dL   Hemoglobin A1c   Result Value Ref Range    Hemoglobin A1C 6.0 (H) 4.0 - 5.6 %    Estimated Avg Glucose 126 68 - 131 mg/dL       Objective:     Vitals:    12/29/17 1444   BP: 112/80   Pulse: 83   Temp: 98.6 °F (37 °C)        Physical Exam   Constitutional: He is oriented to person, place, and time. He appears well-developed and well-nourished.   HENT:   Head: Normocephalic and atraumatic.   Eyes: Conjunctivae are normal.   Cardiovascular: Normal rate and regular rhythm.    Pulmonary/Chest: Effort normal and breath sounds normal.   Abdominal: Soft. He exhibits no distension.   Musculoskeletal: He exhibits no edema.        Right foot: There is normal range of motion and no deformity.        Left foot: There is normal range of motion  and no deformity.   Feet:   Right Foot:   Protective Sensation: 10 sites tested. 10 sites sensed.   Left Foot:   Protective Sensation: 10 sites tested. 10 sites sensed.   Neurological: He is alert and oriented to person, place, and time.   Skin: Skin is warm.   Psychiatric: He has a normal mood and affect. His behavior is normal. Judgment and thought content normal.   Nursing note and vitals reviewed.      Assessment:       1. Type 2 diabetes mellitus with hyperglycemia, without long-term current use of insulin    2. Hypertension associated with diabetes    3. Normocytic anemia    4. Kidney stones    5. BMI 29.0-29.9,adult    6. Hyperlipidemia, unspecified hyperlipidemia type    7. Elevated LFTs        Plan:       Type 2 diabetes mellitus with hyperglycemia, without long-term current use of insulin  Diabetes Management Status  Statin: Taking  ACE/ARB: Taking  Screening or Prevention Patient's value Goal Complete/Controlled?   HgA1C Testing and Control   Lab Results   Component Value Date    HGBA1C 6.0 (H) 11/08/2017      Annually/Less than 8% Yes   Lipid profile : 11/08/2017 Annually Yes   LDL control Lab Results   Component Value Date    LDLCALC 66.4 11/08/2017    Annually/Less than 100 mg/dl  Yes   Nephropathy screening No results found for: LABMICR  No results found for: PROTEINUA Annually No   Blood pressure BP Readings from Last 1 Encounters:   12/29/17 112/80    Less than 140/90 Yes   Dilated retinal exam : 12/29/2017 Annually Yes   Foot exam   : 12/29/2017 Annually No   Continue metformin  Diabetic education  Weight loss  Decrease tortillas, carbs, pasta.   A1c and urine microalbumin on 2/1/18  -     metFORMIN (GLUCOPHAGE-XR) 500 MG 24 hr tablet; Take 1 tablet (500 mg total) by mouth once daily.  Dispense: 90 tablet; Refill: 1  -     Ambulatory Referral to Diabetes Education  -     Microalbumin/creatinine urine ratio; Standing  -     Diabetic Eye Screening Photo; Future    Hypertension associated with  diabetes  Continue medication  -     irbesartan (AVAPRO) 150 MG tablet; Take 1 tablet (150 mg total) by mouth once daily.  Dispense: 90 tablet; Refill: 1    Normocytic anemia  Repeat CBC on 2/1/18    Kidney stones  -     allopurinol (ZYLOPRIM) 300 MG tablet; Take 1 tablet (300 mg total) by mouth once daily.  Dispense: 90 tablet; Refill: 1    BMI 29.0-29.9,adult  Diet and exercise counseling performed  Handouts given    Hyperlipidemia, unspecified hyperlipidemia type  Switched to high intensity statin based on history of DM and HTN  -     atorvastatin (LIPITOR) 40 MG tablet; Take 1 tablet (40 mg total) by mouth once daily.  Dispense: 90 tablet; Refill: 1  -     Ambulatory Referral to Diabetes Education    Elevated LFTs  Repeat CMP scheduled for 2/1/18    Other orders  -     ibuprofen (ADVIL,MOTRIN) 600 MG tablet; Take 1 tablet (600 mg total) by mouth 3 (three) times daily with meals.  Dispense: 60 tablet; Refill: 0      Warning signs discussed, patient to call with any further issues or worsening of symptoms.

## 2017-12-29 NOTE — PROGRESS NOTES
Trevor Johnston is a 62 y.o. male here for a diabetic eye screening with non-dilated fundus photos per Dr Pearce    Patient cooperative?: Yes  Small pupils?: Yes  Last eye exam: 02/2017    For exam results, see Encounter Report.

## 2018-01-02 ENCOUNTER — TELEPHONE (OUTPATIENT)
Dept: FAMILY MEDICINE | Facility: CLINIC | Age: 63
End: 2018-01-02

## 2018-01-02 DIAGNOSIS — E11.65 TYPE 2 DIABETES MELLITUS WITH HYPERGLYCEMIA, WITHOUT LONG-TERM CURRENT USE OF INSULIN: Primary | ICD-10-CM

## 2018-01-02 PROCEDURE — 92250 FUNDUS PHOTOGRAPHY W/I&R: CPT | Mod: 26,S$PBB,, | Performed by: OPTOMETRIST

## 2018-01-02 NOTE — TELEPHONE ENCOUNTER
Spoke with patient about Opthalmology appointment.  Patient states he already have an Opthalmology Dr Florin Dotson and that Dr Dotson referred him to Dr Ayan Covarrubias (Retina Specialist) [phone] fax , which he seen today 01/02/18 for an Occular injection.  A release of medical records will need to be sing to get this records.  Patient will come to office to sign a release.  Patient verbalized understanding on instruction given.

## 2018-01-02 NOTE — TELEPHONE ENCOUNTER
They were unable to get clear image on the eye camera. I have placed a referral to opthalmology. Please call and let patient know.

## 2018-01-09 DIAGNOSIS — E78.5 HYPERLIPIDEMIA, UNSPECIFIED HYPERLIPIDEMIA TYPE: ICD-10-CM

## 2018-01-09 DIAGNOSIS — E11.59 HYPERTENSION ASSOCIATED WITH DIABETES: ICD-10-CM

## 2018-01-09 DIAGNOSIS — E11.65 TYPE 2 DIABETES MELLITUS WITH HYPERGLYCEMIA, WITHOUT LONG-TERM CURRENT USE OF INSULIN: ICD-10-CM

## 2018-01-09 DIAGNOSIS — N20.0 KIDNEY STONES: ICD-10-CM

## 2018-01-09 DIAGNOSIS — I15.2 HYPERTENSION ASSOCIATED WITH DIABETES: ICD-10-CM

## 2018-01-09 RX ORDER — IRBESARTAN 150 MG/1
150 TABLET ORAL DAILY
Qty: 90 TABLET | Refills: 1 | Status: SHIPPED | OUTPATIENT
Start: 2018-01-09 | End: 2018-04-03 | Stop reason: SDUPTHER

## 2018-01-09 RX ORDER — METFORMIN HYDROCHLORIDE 500 MG/1
500 TABLET, EXTENDED RELEASE ORAL DAILY
Qty: 90 TABLET | Refills: 1 | Status: SHIPPED | OUTPATIENT
Start: 2018-01-09 | End: 2018-04-03 | Stop reason: SDUPTHER

## 2018-01-09 RX ORDER — ATORVASTATIN CALCIUM 40 MG/1
40 TABLET, FILM COATED ORAL DAILY
Qty: 90 TABLET | Refills: 1 | Status: SHIPPED | OUTPATIENT
Start: 2018-01-09 | End: 2018-04-03 | Stop reason: SDUPTHER

## 2018-01-09 RX ORDER — ALLOPURINOL 300 MG/1
300 TABLET ORAL DAILY
Qty: 90 TABLET | Refills: 1 | Status: SHIPPED | OUTPATIENT
Start: 2018-01-09 | End: 2018-04-03 | Stop reason: SDUPTHER

## 2018-01-09 NOTE — TELEPHONE ENCOUNTER
----- Message from Elizabeth Aleman sent at 1/9/2018  4:40 PM CST -----  Contact: [phone]/self  Patient requesting to speak with you regarding refills for:    metFORMIN (GLUCOPHAGE-XR) 500 MG 24 hr tablet  allopurinol (ZYLOPRIM) 300 MG tablet  irbesartan (AVAPRO) 150 MG tablet  atorvastatin (LIPITOR) 40 MG tablet    Please call and advise.

## 2018-01-15 ENCOUNTER — LAB VISIT (OUTPATIENT)
Dept: LAB | Facility: HOSPITAL | Age: 63
End: 2018-01-15
Attending: FAMILY MEDICINE

## 2018-01-15 DIAGNOSIS — Z12.11 COLON CANCER SCREENING: ICD-10-CM

## 2018-01-15 LAB — HEMOCCULT STL QL IA: NEGATIVE

## 2018-01-15 PROCEDURE — 82274 ASSAY TEST FOR BLOOD FECAL: CPT

## 2018-02-01 ENCOUNTER — LAB VISIT (OUTPATIENT)
Dept: LAB | Facility: HOSPITAL | Age: 63
End: 2018-02-01
Attending: FAMILY MEDICINE
Payer: OTHER GOVERNMENT

## 2018-02-01 DIAGNOSIS — E11.65 TYPE 2 DIABETES MELLITUS WITH HYPERGLYCEMIA, WITHOUT LONG-TERM CURRENT USE OF INSULIN: ICD-10-CM

## 2018-02-01 DIAGNOSIS — E78.5 DM TYPE 2 WITH DIABETIC DYSLIPIDEMIA: ICD-10-CM

## 2018-02-01 DIAGNOSIS — E11.69 DM TYPE 2 WITH DIABETIC DYSLIPIDEMIA: ICD-10-CM

## 2018-02-01 DIAGNOSIS — R79.89 ELEVATED LFTS: ICD-10-CM

## 2018-02-01 DIAGNOSIS — D64.9 MILD ANEMIA: ICD-10-CM

## 2018-02-01 LAB
ALBUMIN SERPL BCP-MCNC: 4.1 G/DL
ALP SERPL-CCNC: 81 U/L
ALT SERPL W/O P-5'-P-CCNC: 37 U/L
ANION GAP SERPL CALC-SCNC: 8 MMOL/L
AST SERPL-CCNC: 22 U/L
BASOPHILS # BLD AUTO: 0.05 K/UL
BASOPHILS NFR BLD: 0.7 %
BILIRUB SERPL-MCNC: 0.5 MG/DL
BUN SERPL-MCNC: 16 MG/DL
CALCIUM SERPL-MCNC: 9.8 MG/DL
CHLORIDE SERPL-SCNC: 103 MMOL/L
CO2 SERPL-SCNC: 26 MMOL/L
CREAT SERPL-MCNC: 0.8 MG/DL
DIFFERENTIAL METHOD: ABNORMAL
EOSINOPHIL # BLD AUTO: 0.2 K/UL
EOSINOPHIL NFR BLD: 2.6 %
ERYTHROCYTE [DISTWIDTH] IN BLOOD BY AUTOMATED COUNT: 12.7 %
EST. GFR  (AFRICAN AMERICAN): >60 ML/MIN/1.73 M^2
EST. GFR  (NON AFRICAN AMERICAN): >60 ML/MIN/1.73 M^2
ESTIMATED AVG GLUCOSE: 134 MG/DL
GLUCOSE SERPL-MCNC: 121 MG/DL
HBA1C MFR BLD HPLC: 6.3 %
HCT VFR BLD AUTO: 40.8 %
HGB BLD-MCNC: 13.4 G/DL
IMM GRANULOCYTES # BLD AUTO: 0.03 K/UL
IMM GRANULOCYTES NFR BLD AUTO: 0.4 %
LYMPHOCYTES # BLD AUTO: 2.3 K/UL
LYMPHOCYTES NFR BLD: 29.4 %
MCH RBC QN AUTO: 30.2 PG
MCHC RBC AUTO-ENTMCNC: 32.8 G/DL
MCV RBC AUTO: 92 FL
MONOCYTES # BLD AUTO: 0.7 K/UL
MONOCYTES NFR BLD: 9.3 %
NEUTROPHILS # BLD AUTO: 4.4 K/UL
NEUTROPHILS NFR BLD: 57.6 %
NRBC BLD-RTO: 0 /100 WBC
PLATELET # BLD AUTO: 266 K/UL
PMV BLD AUTO: 10 FL
POTASSIUM SERPL-SCNC: 4.3 MMOL/L
PROT SERPL-MCNC: 7.7 G/DL
RBC # BLD AUTO: 4.43 M/UL
SODIUM SERPL-SCNC: 137 MMOL/L
WBC # BLD AUTO: 7.66 K/UL

## 2018-02-01 PROCEDURE — 36415 COLL VENOUS BLD VENIPUNCTURE: CPT | Mod: PO

## 2018-02-01 PROCEDURE — 83036 HEMOGLOBIN GLYCOSYLATED A1C: CPT

## 2018-02-01 PROCEDURE — 85025 COMPLETE CBC W/AUTO DIFF WBC: CPT

## 2018-02-01 PROCEDURE — 80053 COMPREHEN METABOLIC PANEL: CPT

## 2018-02-08 ENCOUNTER — TELEPHONE (OUTPATIENT)
Dept: FAMILY MEDICINE | Facility: CLINIC | Age: 63
End: 2018-02-08

## 2018-02-08 DIAGNOSIS — E11.65 TYPE 2 DIABETES MELLITUS WITH HYPERGLYCEMIA, WITHOUT LONG-TERM CURRENT USE OF INSULIN: Primary | ICD-10-CM

## 2018-02-08 PROBLEM — E11.649 TYPE 2 DIABETES MELLITUS WITH HYPOGLYCEMIA WITHOUT COMA, WITHOUT LONG-TERM CURRENT USE OF INSULIN: Status: RESOLVED | Noted: 2017-10-09 | Resolved: 2018-02-08

## 2018-02-08 NOTE — TELEPHONE ENCOUNTER
Done. Thank you.    Chema    ----- Message from Cathleen Sanchez MA sent at 2/8/2018  4:04 PM CST -----      ----- Message -----  From: Yandy Lisa  Sent: 2/8/2018   3:59 PM  To: [facility] Staff    Good afternoon,  The patient's diabetes education order was denied by Nisa. Apparently we have the wrong ID number for him. Can this be reordered so that a new authorization can be processed?     Thanks

## 2018-02-14 ENCOUNTER — OFFICE VISIT (OUTPATIENT)
Dept: FAMILY MEDICINE | Facility: CLINIC | Age: 63
End: 2018-02-14
Payer: OTHER GOVERNMENT

## 2018-02-14 VITALS
HEART RATE: 92 BPM | SYSTOLIC BLOOD PRESSURE: 120 MMHG | BODY MASS INDEX: 29.72 KG/M2 | OXYGEN SATURATION: 97 % | WEIGHT: 184.94 LBS | TEMPERATURE: 99 F | HEIGHT: 66 IN | DIASTOLIC BLOOD PRESSURE: 72 MMHG

## 2018-02-14 DIAGNOSIS — I10 ESSENTIAL HYPERTENSION: ICD-10-CM

## 2018-02-14 DIAGNOSIS — K64.8 OTHER HEMORRHOIDS: ICD-10-CM

## 2018-02-14 DIAGNOSIS — E78.5 HYPERLIPIDEMIA, UNSPECIFIED HYPERLIPIDEMIA TYPE: ICD-10-CM

## 2018-02-14 DIAGNOSIS — M10.9 GOUT, UNSPECIFIED CAUSE, UNSPECIFIED CHRONICITY, UNSPECIFIED SITE: ICD-10-CM

## 2018-02-14 DIAGNOSIS — E11.65 TYPE 2 DIABETES MELLITUS WITH HYPERGLYCEMIA, WITHOUT LONG-TERM CURRENT USE OF INSULIN: Primary | ICD-10-CM

## 2018-02-14 DIAGNOSIS — R79.89 ELEVATED LFTS: ICD-10-CM

## 2018-02-14 DIAGNOSIS — K63.5 SESSILE COLONIC POLYP: ICD-10-CM

## 2018-02-14 PROCEDURE — 3008F BODY MASS INDEX DOCD: CPT | Mod: ,,, | Performed by: FAMILY MEDICINE

## 2018-02-14 PROCEDURE — 99214 OFFICE O/P EST MOD 30 MIN: CPT | Mod: PBBFAC,PO | Performed by: FAMILY MEDICINE

## 2018-02-14 PROCEDURE — 99214 OFFICE O/P EST MOD 30 MIN: CPT | Mod: S$PBB,,, | Performed by: FAMILY MEDICINE

## 2018-02-14 PROCEDURE — 99999 PR PBB SHADOW E&M-EST. PATIENT-LVL IV: CPT | Mod: PBBFAC,,, | Performed by: FAMILY MEDICINE

## 2018-02-14 NOTE — PROGRESS NOTES
Subjective:       Patient ID: Trevor Johnston is a 62 y.o. male.    Chief Complaint: Follow-up    Trevor is a 62 y.o. male who presents today for follow up on his Dm, HTN, DLD, and BMI. He has gained about 6 pounds over the last few months.     His diabetes is well controlled, albeit his A1c is slightly uptrending.  His A1c is still less than 6.5.  He is having no complications with this.  His foot exam is up-to-date.  He sees ophthalmology.  His blood pressure is well-controlled.  His tolerating his medication well.  He is also tolerating his atorvastatin 40 mg well.  This was recently increased based on his diagnosis of diabetes and hypertension along with history of smoking; he recently just quit.    He was counseled that most of his medical issues are likely related to his weight.  He was counseled to meet with the diabetic educator.  This will be facilitated by our referral coordinator.  He will follow up in 3 months.      Diabetes   He presents for his follow-up diabetic visit. He has type 2 diabetes mellitus. His disease course has been worsening. There are no hypoglycemic associated symptoms. Pertinent negatives for hypoglycemia include no headaches. Pertinent negatives for diabetes include no blurred vision, no chest pain, no fatigue, no foot paresthesias, no foot ulcerations, no polydipsia, no polyphagia, no polyuria, no visual change, no weakness and no weight loss. There are no hypoglycemic complications. Symptoms are stable. Risk factors for coronary artery disease include diabetes mellitus, dyslipidemia, male sex and hypertension. Current diabetic treatment includes oral agent (monotherapy). He is compliant with treatment all of the time. He is following a generally healthy diet. He has not had a previous visit with a dietitian. An ACE inhibitor/angiotensin II receptor blocker is being taken. He does not see a podiatrist.Eye exam is current.   Hyperlipidemia   This is a chronic problem. The current  episode started more than 1 year ago. The problem is controlled. Recent lipid tests were reviewed and are normal. Exacerbating diseases include diabetes. There are no known factors aggravating his hyperlipidemia. Pertinent negatives include no chest pain or shortness of breath. Current antihyperlipidemic treatment includes statins. The current treatment provides significant improvement of lipids. There are no compliance problems.    Hypertension   This is a chronic problem. The current episode started more than 1 year ago. The problem is unchanged. The problem is controlled. Pertinent negatives include no anxiety, blurred vision, chest pain, headaches, neck pain, orthopnea, palpitations, peripheral edema or shortness of breath. There are no associated agents to hypertension. Risk factors for coronary artery disease include male gender, obesity, dyslipidemia and diabetes mellitus. Past treatments include angiotensin blockers. The current treatment provides significant improvement. There are no compliance problems.      Review of Systems   Constitutional: Negative for fatigue and weight loss.   Eyes: Negative for blurred vision.   Respiratory: Negative for shortness of breath.    Cardiovascular: Negative for chest pain, palpitations and orthopnea.   Endocrine: Negative for polydipsia, polyphagia and polyuria.   Musculoskeletal: Negative for neck pain.   Neurological: Negative for weakness and headaches.       Objective:     Vitals:    02/14/18 1306   BP: 120/72   Pulse: 92   Temp: 98.5 °F (36.9 °C)        Physical Exam   Constitutional: He is oriented to person, place, and time. He appears well-developed and well-nourished.   HENT:   Head: Normocephalic and atraumatic.   Right Ear: External ear normal.   Left Ear: External ear normal.   Nose: Nose normal.   Mouth/Throat: Oropharynx is clear and moist.   Eyes: Conjunctivae are normal.   Neck: Normal range of motion.   Cardiovascular: Normal rate and regular rhythm.     Pulmonary/Chest: Effort normal and breath sounds normal.   Abdominal: Soft.   obese   Musculoskeletal: He exhibits no edema.   Neurological: He is alert and oriented to person, place, and time.   Skin: Skin is warm.   Psychiatric: He has a normal mood and affect. His behavior is normal. Judgment and thought content normal.   Nursing note and vitals reviewed.      Assessment:       1. Type 2 diabetes mellitus with hyperglycemia, without long-term current use of insulin    2. Elevated LFTs    3. BMI 29.0-29.9,adult    4. Hyperlipidemia, unspecified hyperlipidemia type    5. Gout, unspecified cause, unspecified chronicity, unspecified site    6. Other hemorrhoids    7. Sessile colonic polyp    8. Essential hypertension        Plan:         Type 2 diabetes mellitus with hyperglycemia, without long-term current use of insulin  Continue metformin 500 mg, no increase today  Weight loss counseling  Meet with diabetic educator  Repeat labs in 3 months  Follow up shortly after labs are drawn  -     Hemoglobin A1c; Future; Expected date: 02/14/2018  -     MICROALBUMIN / CREATININE RATIO URINE    Elevated LFTs  Resolved on repeat lab work    BMI 29.0-29.9,adult  Small sustainable changes  Increase intake of fiber  Decrease carb intake  Decrease drinks with added sugar such as soda or juice  Increase exercise  Handouts given  Meet with diabetic educator     Hyperlipidemia, unspecified hyperlipidemia type  Tolerating atorvastatin 40 mg  Continue statin  No refill needed today    Gout, unspecified cause, unspecified chronicity, unspecified site  Check uric acid in 3 months  Follow up in 3 months  -     Uric acid; Future; Expected date: 02/14/2018    Other hemorrhoids/Sessile colonic polyp  Reviewed [facility] records. Internal and External hemorrhoids along with 4 mm sessile polyp noted in transverse colon  Records added in [facility]    HTN  Well controlled  Continue ibesartan    Warning signs discussed, patient to call with any  further issues or worsening of symptoms.       Parts of the above note were dictated using a voice dictation software. Please excuse any grammatical or typographical errors.

## 2018-02-14 NOTE — PATIENT INSTRUCTIONS
Diabetes Management Status  Statin: Taking  ACE/ARB: Taking  Screening or Prevention Patient's value Goal Complete/Controlled?   HgA1C Testing and Control   Lab Results   Component Value Date    HGBA1C 6.3 (H) 02/01/2018      Annually/Less than 8% Yes   Lipid profile : 11/08/2017 Annually Yes   LDL control Lab Results   Component Value Date    LDLCALC 66.4 11/08/2017    Annually/Less than 100 mg/dl  Yes   Nephropathy screening No results found for: LABMICR  No results found for: PROTEINUA Annually No   Blood pressure BP Readings from Last 1 Encounters:   02/14/18 120/72    Less than 140/90 Yes   Dilated retinal exam : 01/02/2018 Annually Yes   Foot exam   : 12/29/2017 Annually Yes       Low-Salt Diet  This diet removes foods that are high in salt. It also limits the amount of salt you use when cooking. It is most often used for people with high blood pressure, edema (fluid retention), and kidney, liver, or heart disease.  Table salt contains the mineral sodium. Your body needs sodium to work normally. But too much sodium can make your health problems worse. Your healthcare provider is recommending a low-salt (also called low-sodium) diet for you. Your total daily allowance of salt is 1,500 to 2,300 milligrams (mg). It is less than 1 teaspoon of table salt. This means you can have only about 500 to 700 mg of sodium at each meal. People with certain health problems should limit salt intake to the lower end of the recommended range.    When you cook, dont add much salt. If you can cook without using salt, even better. Dont add salt to your food at the table.  When shopping, read food labels. Salt is often called sodium on the label. Choose foods that are salt-free, low salt, or very low salt. Note that foods with reduced salt may not lower your salt intake enough.    Beans, potatoes, and pasta  Ok: Dry beans, split peas, lentils, potatoes, rice, macaroni, pasta, spaghetti without added salt  Avoid: Potato chips,  tortilla chips, and similar products  Breads and cereals  Ok: Low-sodium breads, rolls, cereals, and cakes; low-salt crackers, matzo crackers  Avoid: Salted crackers, pretzels, popcorn, [place] toast, pancakes, muffins  Dairy  Ok: Milk, chocolate milk, hot chocolate mix, low-salt cheeses, and yogurt  Avoid: Processed cheese and cheese spreads; Roquefort, Camembert, and cottage cheese; buttermilk, instant breakfast drink  Desserts  Ok: Ice cream, frozen yogurt, juice bars, gelatin, cookies and pies, sugar, honey, jelly, hard candy  Avoid: Most pies, cakes and cookies prepared or processed with salt; instant pudding  Drinks  Ok: Tea, coffee, fizzy (carbonated) drinks, juices  Avoid: Flavored coffees, electrolyte replacement drinks, sports drinks  Meats  Ok: All fresh meat, fish, poultry, low-salt tuna, eggs, egg substitute  Avoid: Smoked, pickled, brine-cured, or salted meats and fish. This includes hagen, chipped beef, corned beef, hot dogs, deli meats, ham, kosher meats, salt pork, sausage, canned tuna, salted codfish, smoked salmon, herring, sardines, or anchovies.  Seasonings and spices  Ok: Most seasonings are okay. Good substitutes for salt include: fresh herb blends, hot sauce, lemon, garlic, billings, vinegar, dry mustard, parsley, cilantro, horseradish, tomato paste, regular margarine, mayonnaise, unsalted butter, cream cheese, vegetable oil, cream, low-salt salad dressing and gravy.  Avoid: Regular ketchup, relishes, pickles, soy sauce, teriyaki sauce, Worcestershire sauce, BBQ sauce, tartar sauce, meat tenderizer, chili sauce, regular gravy, regular salad dressing, salted butter  Soups  Ok: Low-salt soups and broths made with allowed foods  Avoid: Bouillon cubes, soups with smoked or salted meats, regular soup and broth  Vegetables  Ok: Most vegetables are okay; also low-salt tomato and vegetable juices  Avoid: Sauerkraut and other brine-soaked vegetables; pickles and other pickled vegetables; tomato juice,  sangita  Date Last Reviewed: 8/1/2016  © 4125-1444 [employer]. [address] [place] [address], [place] [address]. All rights reserved. This information is not intended as a substitute for professional medical care. Always follow your healthcare professional's instructions.        4 Steps for Eating Healthier  Changing the way you eat can improve your health. It can lower your cholesterol and blood pressure, and help you stay at a healthy weight. Your diet doesnt have to be bland and boring to be healthy. Just watch your calories and follow these steps:    1. Eat fewer unhealthy fats  · Choose more fish and lean meats instead of fatty cuts of meat.  · Skip butter and lard, and use less margarine.  · Pass on foods that have palm, coconut, or hydrogenated oils.  · Eat fewer high-fat dairy foods like cheese, ice cream, and whole milk.  · Get a heart-healthy cookbook and try some low-fat recipes.  2. Go light on salt  · Keep the saltshaker off the table.  · Limit high-salt ingredients, such as soy sauce, bouillon, and garlic salt.  · Instead of adding salt when cooking, season your food with herbs and flavorings. Try lemon, garlic, and onion.  · Limit convenience foods, such as boxed or canned foods and restaurant food.  · Read food labels and choose lower-sodium options.  3. Limit sugar  · Pause before you add sugars to pancakes, cereal, coffee, or tea. This includes white and brown table sugar, syrup, honey, and molasses. Cut your usual amount by half.  · Use non-sugar sweeteners. Stevia, aspartame, and sucralose can satisfy a sweet tooth without adding calories.  · Swap out sugar-filled soda and other drinks. Buy sugar-free or low-calorie beverages. Remember water is always the best choice.  · Read labels and choose foods with less added sugar. Keep in mind that dairy foods and foods with fruit will have some natural sugar.  · Cut the sugar in recipes by 1/3 to 1/2. Boost the flavor with extracts like almond,  vanilla, or orange. Or add spices such as cinnamon or nutmeg.  4. Eat more fiber  · Eat fresh fruits and vegetables every day.  · Boost your diet with whole grains. Go for oats, whole-grain rice, and bran.  · Add beans and lentils to your meals.  · Drink more water to match your fiber increase. This is to help prevent constipation.  Date Last Reviewed: 5/11/2015  © 0702-4140 [employer]. [address] [place] [address], [place] [address]. All rights reserved. This information is not intended as a substitute for professional medical care. Always follow your healthcare professional's instructions.        Understanding Fat and Cholesterol  Too much cholesterol in your blood can lead to many problems such as blocked arteries. This can lead to heart attack and stroke. One of the best ways to manage heart and blood vessel disease is to lower your blood cholesterol. Planning meals that are low in saturated fat and cholesterol helps reduce the level of cholesterol in your blood. Below are eating tips to help lower your blood cholesterol levels.  Eat less fat  A healthy goal is to have less than 25% to 35% of your daily calories come from fat. Instead of fats, eat more fruits, whole-grains, and vegetables. This also helps control your weight, and can even reduce your risk for some cancers. There are different kinds of fats in foods. Fats can be saturated, unsaturated, or trans fats. The best fats to choose are unsaturated fats. But fats are high in calories, so eat even unsaturated fats sparingly.  Limit foods high in saturated fats  Saturated fats come from animals and certain plants (such as coconut and palm). Eating too much saturated fat can raise your blood cholesterol levels and make your artery problems worse. Your goal is to eat less saturated fat. Below are some examples of foods that contain lots of saturated fat:  · Fatty cuts of meat (lamb, ham, beef)  · Many pastries, cakes, cookies, and candies  · Cream,  ice cream, sour cream, cheese, and butter, and foods made with them  · Sauces made with butter or cream  · Salad dressings with saturated fats  · Foods that contain palm or coconut oil  Choose unsaturated fats  Unsaturated fats are usually liquid at room temperature. They are better choices for your heart than saturated fat. There are two types of unsaturated fats: polyunsaturated fat and monounsaturated fat. Aim to replace saturated fats with polyunsaturated or monounsaturated fats.  · Polyunsaturated fats are found in corn oil, safflower oil, sunflower oil, and other vegetable oils.  · Monounsaturated fats are found in olive oil, canola oil, and peanut oil. Some margarines and spreads are now made with these oils, too. Avocados are also high in monounsaturated fat.  Of all fats, monounsaturated fats are the least harmful to your heart.  Avoid trans fats  Like saturated fats, trans fats have been linked to heart disease. Even a small amount can harm your health. Trans fats are found in liquid oils that have been changed to be solid at room temperature. Margarine, which is often made from vegetable oil, is one example. Vegetable shortening is another. Trans fats are often found in packaged goods. Check ingredients for the words hydrogenated or partially hydrogenated. They mean the foods contain trans fat.  What about triglycerides?  Triglycerides are a type of fat in your blood. Like cholesterol, high levels of triglycerides can lead to blocked arteries. High triglyceride levels can be reduced 20% to 50%  by limiting added sugars in your diet, susbstituting healthier fats for saturated and trans fats, getting more physical activity, and losing weight if your are overweight. You may also be advised to avoid or limi alcohol.    Reading food labels  Luckily, most foods now have labels giving you the facts about what youre eating. Reading food labels helps you make healthy choices. Look for the words highlighted  below.  · Serving Size. This is the amount of food in 1 serving. If you eat larger portions, be sure to count more of everything: fat, calories, and cholesterol.  · Total Fat. Tells you how many grams (g) of fat are in 1 serving.  · Calories from Fat. This tells you the total number of calories from fat in 1 serving (there are 9 calories per gram of fat). Look for foods with the fewest calories from fat.  · Saturated Fat. Tells you how many grams (g) of saturated fat are in 1 serving.  · Trans Fat. Tells how many grams (g) of trans fat are in 1 serving.  · Cholesterol. Tells you how many milligrams (mg) of cholesterol are in 1 serving.  Date Last Reviewed: 5/11/2015  © 8208-1363 [employer]. [address] [place] [address], [place] [address]. All rights reserved. This information is not intended as a substitute for professional medical care. Always follow your healthcare professional's instructions.

## 2018-03-05 ENCOUNTER — CLINICAL SUPPORT (OUTPATIENT)
Dept: DIABETES | Facility: CLINIC | Age: 63
End: 2018-03-05
Payer: OTHER GOVERNMENT

## 2018-03-05 VITALS — BODY MASS INDEX: 29.21 KG/M2 | WEIGHT: 181 LBS

## 2018-03-05 DIAGNOSIS — E11.65 TYPE 2 DIABETES MELLITUS WITH HYPERGLYCEMIA, WITHOUT LONG-TERM CURRENT USE OF INSULIN: ICD-10-CM

## 2018-03-05 PROCEDURE — 99999 PR PBB SHADOW E&M-EST. PATIENT-LVL I: CPT | Mod: PBBFAC,,,

## 2018-03-05 PROCEDURE — 99211 OFF/OP EST MAY X REQ PHY/QHP: CPT | Mod: PBBFAC,PO | Performed by: REGISTERED NURSE

## 2018-03-05 PROCEDURE — G0108 DIAB MANAGE TRN  PER INDIV: HCPCS | Mod: PBBFAC,PO | Performed by: REGISTERED NURSE

## 2018-03-05 NOTE — PROGRESS NOTES
Diabetes Education  Author: Marylu Crum RN  Date: 3/5/2018    Diabetes Education Visit  Diabetes Education Record Assessment/Progress: Initial  Diabetes Type  Diabetes Type : Type II  Diabetes History  Diabetes Diagnosis: 1-3 years  Nutrition  Meal Planning: water, 3 meals per day, artificial sweeteners, eats out seldom, snacks between meal  What type of sweetener do you use?: Splenda  What type of beverages do you drink?: water  Meal Plan 24 Hour Recall - Breakfast: coffee, 2 slices toast, butter  Meal Plan 24 Hour Recall - Lunch: nothing  Meal Plan 24 Hour Recall - Dinner: fish, beans and rice, beets  Meal Plan 24 Hour Recall - Snack: cheerios, milk, banana  Monitoring   Self Monitoring : pt was given an accu chek guide meter and instructed in its use. pt instructed to test once a day fasting. pt instructed on the normal bs ranges. pt instructed to keep a record of his bs's and to bring the record to his md visits.  Blood Glucose Logs: No  In the last month, how often have you had a low blood sugar reaction?: once  What are your symptoms of low blood sugar?: grouchy   How do you treat low blood sugar?: it went away in about 5 mins  Can you tell when your blood sugar is too high?: no  Exercise   Frequency: Never  Current Diabetes Treatment   Current Treatment: Oral Medication, Diet, Exercise  Social History  Preferred Learning Method: Face to Face, Group Education, Demonstration, Reading Materials  Primary Support: Friends  Educational Level: Some College  Occupation: retired  Smoking Status: Ex Smoker  Alcohol Use: Rarely  DDS-2 Score  ( > 3 = SIGNIFICANT DISTRESS): 1   Barriers to Change  Barriers to Change: None  Learning Challenges : None  Readiness to Learn   Readiness to Learn : Acceptance  Cultural Influences  Cultural Influences: Yes  If yes, please list:: [facility]  Diabetes Education Assessment/Progress  Diabetes Disease Process (diabetes disease process and treatment options): Discussion, Instructed,  Individual Session, Demonstrates Understanding/Competency(verbalizes/demonstrates), Written Materials Provided  Nutrition (Incorporating nutritional management into one's lifestyle): Discussion, Instructed, Individual Session, Demonstrates Understanding/Competency (verbalizes/demonstrates), Written Materials Provided  Physical Activity (incorporating physical activity into one's lifestyle): Discussion, Instructed, Individual Session, Demonstrates Understanding/Competency (verbalizes/demonstrates), Written Materials Provided  Medications (states correct name, dose, onset, peak, duration, side effects & timing of meds): Discussion, Instructed, Individual Session, Demonstrates Understanding/Competency(verbalizes/demonstrates), Written Materials Provided  Monitoring (monitoring blood glucose/other parameters & using results): Discussion, Instructed, Individual Session, Demonstrates Understanding/Competency (verbalizes/demonstrates), Written Materials Provided  Acute Complications (preventing, detecting, and treating acute complications): Discussion, Instructed, Individual Session, Demonstrates Understanding/Competency (verbalizes/demonstrates), Written Materials Provided  Chronic Complications (preventing, detecting, and treating chronic complications): Discussion, Instructed, Individual Session, Demonstrates Understanding/Competency (verbalizes/demonstrates), Written Materials Provided  Clinical (diabetes, other pertinent medical history, and relevant comorbidities reviewed during visit): Discussion, Instructed, Individual Session, Demonstrates Understanding/Competency (verbalizes/demonstrates), Written Materials Provided  Cognitive (knowledge of self-management skills, functional health literacy): Discussion, Instructed, Individual Session, Demonstrates Understanding/Competency (verbalizes/demonstrates)  Psychosocial (emotional response to diabetes): Discussion, Instructed, Individual Session, Demonstrates  Understanding/Competency (verbalizes/demonstrates)  Diabetes Distress and Support Systems: Discussion, Instructed, Individual Session, Demonstrates Understanding/Competency (verbalizes/demonstrates)  Behavioral (readiness for change, lifestyle practices, self-care behaviors): Discussion, Instructed, Individual Session, Demonstrates Understanding/Competency (verbalizes/demonstrates)  Goals  Patient has selected/evaluated goals during today's session: Yes, selected  Healthy Eating: Set  Start Date: 03/05/18  Target Date: 06/05/18  Diabetes Care Plan/Intervention  Education Plan/Intervention: Group Follow-Up DSMT, Foot Exam  Diabetes Meal Plan  Restrictions: Restricted Carbohydrate  Calories: 1800  Carbohydrate Per Meal: 45-60g  Carbohydrate Per Snack : 15-20g  Pt wakes up late but does get his 3 meals in daily. Reviewed meal plan and discussed additions that needed to be made to his meal plan.  Education Units of Time   Time Spent: 60 min    Health Maintenance was reviewed today with patient. Discussed with patient importance of routine eye exams, foot exams/foot care, blood work (i.e.: A1c, microalbumin, and lipid), dental visits, yearly flu vaccine, and pneumonia vaccine as indicated by PCP. Patient verbalized understanding.     Health Maintenance Topics with due status: Not Due       Topic Last Completion Date    Colonoscopy 03/01/2017    Lipid Panel 11/08/2017    Foot Exam 12/29/2017    Eye Exam 01/02/2018    Hemoglobin A1c 02/01/2018    Low Dose Statin 02/14/2018     Health Maintenance Due   Topic Date Due    TETANUS VACCINE  10/07/1973    Pneumococcal PPSV23 (Medium Risk) (1) 10/07/1973    Zoster Vaccine  10/07/2015

## 2018-04-03 DIAGNOSIS — E78.5 HYPERLIPIDEMIA, UNSPECIFIED HYPERLIPIDEMIA TYPE: ICD-10-CM

## 2018-04-03 DIAGNOSIS — N20.0 KIDNEY STONES: ICD-10-CM

## 2018-04-03 DIAGNOSIS — E11.59 HYPERTENSION ASSOCIATED WITH DIABETES: ICD-10-CM

## 2018-04-03 DIAGNOSIS — I15.2 HYPERTENSION ASSOCIATED WITH DIABETES: ICD-10-CM

## 2018-04-03 DIAGNOSIS — E11.65 TYPE 2 DIABETES MELLITUS WITH HYPERGLYCEMIA, WITHOUT LONG-TERM CURRENT USE OF INSULIN: ICD-10-CM

## 2018-04-03 RX ORDER — ATORVASTATIN CALCIUM 40 MG/1
40 TABLET, FILM COATED ORAL DAILY
Qty: 90 TABLET | Refills: 1 | Status: SHIPPED | OUTPATIENT
Start: 2018-04-03 | End: 2018-09-20 | Stop reason: SDUPTHER

## 2018-04-03 RX ORDER — ALLOPURINOL 300 MG/1
300 TABLET ORAL DAILY
Qty: 90 TABLET | Refills: 1 | Status: SHIPPED | OUTPATIENT
Start: 2018-04-03 | End: 2018-09-20 | Stop reason: SDUPTHER

## 2018-04-03 RX ORDER — IRBESARTAN 150 MG/1
150 TABLET ORAL DAILY
Qty: 90 TABLET | Refills: 1 | Status: SHIPPED | OUTPATIENT
Start: 2018-04-03 | End: 2018-09-20 | Stop reason: SDUPTHER

## 2018-04-03 RX ORDER — METFORMIN HYDROCHLORIDE 500 MG/1
500 TABLET, EXTENDED RELEASE ORAL DAILY
Qty: 90 TABLET | Refills: 1 | Status: SHIPPED | OUTPATIENT
Start: 2018-04-03 | End: 2018-09-20 | Stop reason: SDUPTHER

## 2018-04-03 NOTE — TELEPHONE ENCOUNTER
----- Message from Marian Chamberlain sent at 4/3/2018 12:06 PM CDT -----  No. [phone]   Patient needs script for Metformin 500mg, 1 daily, #90, Atorvastatin 40mg, 1 daily, #90, Allopurinol 300mg, 1 daily, #90, Irbesartan 150mg, 1 daily, #90, and Ibuprofen 800mg, as needed, #90.    Please call into [facility].

## 2018-04-13 ENCOUNTER — CLINICAL SUPPORT (OUTPATIENT)
Dept: DIABETES | Facility: CLINIC | Age: 63
End: 2018-04-13
Payer: OTHER GOVERNMENT

## 2018-04-13 DIAGNOSIS — E11.65 TYPE 2 DIABETES MELLITUS WITH HYPERGLYCEMIA, WITHOUT LONG-TERM CURRENT USE OF INSULIN: ICD-10-CM

## 2018-04-13 PROCEDURE — G0109 DIAB MANAGE TRN IND/GROUP: HCPCS | Mod: PBBFAC,PO | Performed by: REGISTERED NURSE

## 2018-04-13 NOTE — PROGRESS NOTES
Diabetes Education  Author: Marylu Crum RN  Date: 4/13/2018    Diabetes Education Visit  Diabetes Education Record Assessment/Progress: Comprehensive/Group  Diabetes Type  Diabetes Type : Type II    Diabetes Education Assessment/Progress  Diabetes Disease Process (diabetes disease process and treatment options): Discussion, Lecture, Instructed, Class, Demonstrates Understanding/Competency(verbalizes/demonstrates), Written Materials Provided  Nutrition (Incorporating nutritional management into one's lifestyle): Discussion, Demonstration, Lecture, Instructed, Class, Demonstrates Understanding/Competency (verbalizes/demonstrates), Written Materials Provided  Physical Activity (incorporating physical activity into one's lifestyle): Discussion, Lecture, Instructed, Class, Demonstrates Understanding/Competency (verbalizes/demonstrates)  Medications (states correct name, dose, onset, peak, duration, side effects & timing of meds): Not Covered/Deferred  Monitoring (monitoring blood glucose/other parameters & using results): Not Covered/Deferred  Acute Complications (preventing, detecting, and treating acute complications): Not Covered/Deferred  Chronic Complications (preventing, detecting, and treating chronic complications): Not Covered/Deferred  Clinical (diabetes, other pertinent medical history, and relevant comorbidities reviewed during visit): Not Covered/Deferred  Cognitive (knowledge of self-management skills, functional health literacy): Not Covered/Deferred  Psychosocial (emotional response to diabetes): Discussion, Lecture, Instructed, Class, Demonstrates Understanding/Competency (verbalizes/demonstrates)  Diabetes Distress and Support Systems: Not Covered/Deferred  Behavioral (readiness for change, lifestyle practices, self-care behaviors): Discussion, Lecture, Instructed, Class, Demonstrates Understanding/Competency (verbalizes/demonstrates)  Post class test score 74.    Education Units of Time   Time  Spent: 180 min    Health Maintenance was reviewed today with patient. Discussed with patient importance of routine eye exams, foot exams/foot care, blood work (i.e.: A1c, microalbumin, and lipid), dental visits, yearly flu vaccine, and pneumonia vaccine as indicated by PCP. Patient verbalized understanding.     Health Maintenance Topics with due status: Not Due       Topic Last Completion Date    Lipid Panel 11/08/2017    Foot Exam 12/29/2017    Eye Exam 01/02/2018    Fecal Occult Blood Test (FOBT)/FitKit 01/15/2018    Hemoglobin A1c 02/01/2018    Low Dose Statin 04/03/2018     Health Maintenance Due   Topic Date Due    TETANUS VACCINE  10/07/1973    Pneumococcal PPSV23 (Medium Risk) (1) 10/07/1973    Zoster Vaccine  10/07/2015

## 2018-05-14 ENCOUNTER — LAB VISIT (OUTPATIENT)
Dept: LAB | Facility: HOSPITAL | Age: 63
End: 2018-05-14
Attending: FAMILY MEDICINE
Payer: OTHER GOVERNMENT

## 2018-05-14 DIAGNOSIS — E11.65 TYPE 2 DIABETES MELLITUS WITH HYPERGLYCEMIA, WITHOUT LONG-TERM CURRENT USE OF INSULIN: ICD-10-CM

## 2018-05-14 DIAGNOSIS — M10.9 GOUT, UNSPECIFIED CAUSE, UNSPECIFIED CHRONICITY, UNSPECIFIED SITE: ICD-10-CM

## 2018-05-14 LAB
ESTIMATED AVG GLUCOSE: 126 MG/DL
HBA1C MFR BLD HPLC: 6 %
URATE SERPL-MCNC: 3.7 MG/DL

## 2018-05-14 PROCEDURE — 36415 COLL VENOUS BLD VENIPUNCTURE: CPT | Mod: PO

## 2018-05-14 PROCEDURE — 84550 ASSAY OF BLOOD/URIC ACID: CPT

## 2018-05-14 PROCEDURE — 83036 HEMOGLOBIN GLYCOSYLATED A1C: CPT

## 2018-05-16 ENCOUNTER — OFFICE VISIT (OUTPATIENT)
Dept: FAMILY MEDICINE | Facility: CLINIC | Age: 63
End: 2018-05-16
Payer: OTHER GOVERNMENT

## 2018-05-16 VITALS
BODY MASS INDEX: 27.56 KG/M2 | DIASTOLIC BLOOD PRESSURE: 84 MMHG | HEART RATE: 78 BPM | HEIGHT: 66 IN | WEIGHT: 171.5 LBS | TEMPERATURE: 98 F | OXYGEN SATURATION: 97 % | SYSTOLIC BLOOD PRESSURE: 112 MMHG

## 2018-05-16 DIAGNOSIS — R80.9 TYPE 2 DIABETES MELLITUS WITH MICROALBUMINURIA, WITHOUT LONG-TERM CURRENT USE OF INSULIN: Primary | ICD-10-CM

## 2018-05-16 DIAGNOSIS — Z87.891 FORMER CIGARETTE SMOKER: ICD-10-CM

## 2018-05-16 DIAGNOSIS — M1A.9XX0 CHRONIC GOUT WITHOUT TOPHUS, UNSPECIFIED CAUSE, UNSPECIFIED SITE: ICD-10-CM

## 2018-05-16 DIAGNOSIS — M62.838 MUSCLE SPASM: ICD-10-CM

## 2018-05-16 DIAGNOSIS — E11.59 HYPERTENSION ASSOCIATED WITH DIABETES: ICD-10-CM

## 2018-05-16 DIAGNOSIS — E11.29 TYPE 2 DIABETES MELLITUS WITH MICROALBUMINURIA, WITHOUT LONG-TERM CURRENT USE OF INSULIN: Primary | ICD-10-CM

## 2018-05-16 DIAGNOSIS — M54.6 ACUTE THORACIC BACK PAIN, UNSPECIFIED BACK PAIN LATERALITY: ICD-10-CM

## 2018-05-16 DIAGNOSIS — Z00.00 VISIT FOR WELL MAN HEALTH CHECK: ICD-10-CM

## 2018-05-16 DIAGNOSIS — I15.2 HYPERTENSION ASSOCIATED WITH DIABETES: ICD-10-CM

## 2018-05-16 PROCEDURE — 99999 PR PBB SHADOW E&M-EST. PATIENT-LVL III: CPT | Mod: PBBFAC,,, | Performed by: FAMILY MEDICINE

## 2018-05-16 PROCEDURE — 99214 OFFICE O/P EST MOD 30 MIN: CPT | Mod: S$PBB,,, | Performed by: FAMILY MEDICINE

## 2018-05-16 PROCEDURE — 99213 OFFICE O/P EST LOW 20 MIN: CPT | Mod: PBBFAC,PO | Performed by: FAMILY MEDICINE

## 2018-05-16 RX ORDER — LANCETS
EACH MISCELLANEOUS
Qty: 100 EACH | Refills: 0 | Status: SHIPPED | OUTPATIENT
Start: 2018-05-16 | End: 2018-05-29 | Stop reason: SDUPTHER

## 2018-05-16 RX ORDER — TIZANIDINE 4 MG/1
4 TABLET ORAL NIGHTLY PRN
Qty: 30 TABLET | Refills: 0 | Status: SHIPPED | OUTPATIENT
Start: 2018-05-16 | End: 2018-05-26

## 2018-05-16 RX ORDER — INSULIN PUMP SYRINGE, 3 ML
EACH MISCELLANEOUS
Qty: 1 EACH | Refills: 0 | Status: SHIPPED | OUTPATIENT
Start: 2018-05-16 | End: 2018-05-29 | Stop reason: SDUPTHER

## 2018-05-16 NOTE — PROGRESS NOTES
Subjective:       Patient ID: Trevor Johnston is a 62 y.o. male.    Chief Complaint: Follow-up    Trevor is a 62 y.o. male who presents today for follow up on his chronic medical condition, HTN, DM, DLD.     DM: He has lost about 10 pounds! He has been active, but no formal physical exercise routine. He has been eating smaller portions, less carbs. He needs test strips for his monitor. No signs of low blood sugar. No side effects of the medication.   HTN: stable. No headaches. Is tolerating avapro with no issues.   DLD: tolerating statin. No myalgias.     He is retired and has been renovating a 20lineso. At times, his back seems to hurt. This has been on going for 5-6 months. Mostly hurts after a long day of activity. No trauma. No radiation. No numbness or tingling.       Hypertension   This is a new problem. The current episode started more than 1 year ago. The problem has been gradually improving since onset. Pertinent negatives include no blurred vision, chest pain, headaches, neck pain, palpitations, shortness of breath or sweats. There are no associated agents to hypertension. Risk factors for coronary artery disease include male gender, obesity, dyslipidemia and diabetes mellitus. Past treatments include angiotensin blockers. The current treatment provides significant improvement. There are no compliance problems.    Diabetes   He presents for his follow-up diabetic visit. He has type 2 diabetes mellitus. His disease course has been improving. There are no hypoglycemic associated symptoms. Pertinent negatives for hypoglycemia include no confusion, headaches or sweats. Associated symptoms include weight loss. Pertinent negatives for diabetes include no blurred vision, no chest pain, no fatigue, no foot paresthesias, no foot ulcerations, no polydipsia, no polyphagia, no polyuria, no visual change and no weakness. Risk factors for coronary artery disease include dyslipidemia, diabetes mellitus, hypertension and  male sex. Current diabetic treatment includes oral agent (monotherapy). He is compliant with treatment all of the time. He is following a diabetic and generally healthy diet. He has had a previous visit with a dietitian. He participates in exercise three times a week. An ACE inhibitor/angiotensin II receptor blocker is being taken. He does not see a podiatrist.Eye exam is current.     Review of Systems   Constitutional: Positive for weight loss. Negative for activity change, fatigue and unexpected weight change.   HENT: Negative for hearing loss, rhinorrhea and trouble swallowing.    Eyes: Negative for blurred vision, discharge and visual disturbance.   Respiratory: Negative for chest tightness, shortness of breath and wheezing.    Cardiovascular: Negative for chest pain and palpitations.   Gastrointestinal: Negative for blood in stool, constipation, diarrhea and vomiting.   Endocrine: Negative for polydipsia, polyphagia and polyuria.   Genitourinary: Negative for difficulty urinating, hematuria and urgency.   Musculoskeletal: Negative for arthralgias, joint swelling and neck pain.   Neurological: Negative for weakness and headaches.   Psychiatric/Behavioral: Negative for confusion and dysphoric mood.         Lab Review   Lab Visit on 05/14/2018   Component Date Value    Microalbum.,U,Random 05/14/2018 7.0     Creatinine, Random Ur 05/14/2018 101.0     Microalb Creat Ratio 05/14/2018 6.9    Lab Visit on 05/14/2018   Component Date Value    Hemoglobin A1C 05/14/2018 6.0*    Estimated Avg Glucose 05/14/2018 126     Uric Acid 05/14/2018 3.7         Objective:     Vitals:    05/16/18 1306   BP: 112/84   Pulse: 78   Temp: 98.2 °F (36.8 °C)        Physical Exam   Constitutional: He is oriented to person, place, and time. He appears well-developed and well-nourished.   HENT:   Head: Normocephalic and atraumatic.   Eyes: Conjunctivae are normal. Pupils are equal, round, and reactive to light.   Neck: Normal range of  motion. Neck supple.   Cardiovascular: Normal rate and regular rhythm.    Pulmonary/Chest: Effort normal and breath sounds normal.   Abdominal: Soft. There is no tenderness.   Musculoskeletal: He exhibits no edema.        Thoracic back: He exhibits spasm. He exhibits no tenderness, no bony tenderness, no edema and no deformity.        Back:    Neurological: He is alert and oriented to person, place, and time.   Skin: Skin is warm. No rash noted.   Psychiatric: He has a normal mood and affect. His behavior is normal. Judgment and thought content normal.   Nursing note and vitals reviewed.      Assessment:       1. Type 2 diabetes mellitus with microalbuminuria, without long-term current use of insulin    2. Hypertension associated with diabetes    3. Chronic gout without tophus, unspecified cause, unspecified site    4. Acute thoracic back pain, unspecified back pain laterality    5. Muscle spasm    6. Visit for [facility] health check    7. Former cigarette smoker        Plan:         Type 2 diabetes mellitus with microalbuminuria, without long-term current use of insulin  Continue metformin  If A1c is around 5.7, can consider stopping metformin  Continue diet and exercise changes  Refilled test strips  -     blood-glucose meter kit; To check BG one times daily, to use with insurance preferred meter  Dispense: 1 each; Refill: 0  -     lancets Misc; To check BG 1 times daily, to use with insurance preferred meter  Dispense: 100 each; Refill: 0  -     blood sugar diagnostic Strp; To check BG one times daily, to use with insurance preferred meter  Dispense: 100 each; Refill: 0    Hypertension associated with diabetes  Continue current medications  BP well controlled  DASH diet  Ambulatory BP monitoring PRN    Chronic gout without tophus, unspecified cause, unspecified site  Stable  Continue allopurinol    Acute thoracic back pain, unspecified back pain laterality/Muscle spasm  No imaging for now  R.I.C.E, can also  consider heating pads   Natural history and expected course discussed. Questions answered.  Stretching exercises discussed.  Ice to affected area as needed for local pain relief.  OTC NSAIDs   Muscle relaxants per medication orders. THIS MAY BE SEDATING. PLEASE BE CAREFUL WITH DRIVING UNTIL YOU KNOW HOW THIS AFFECTS YOU  If symptoms change, to call and can consider imaging.   -     tiZANidine (ZANAFLEX) 4 MG tablet; Take 1 tablet (4 mg total) by mouth nightly as needed.  Dispense: 30 tablet; Refill: 0    Visit for [facility] check  Physical in 4 months  Follow up q4 months after that  Labs prior to physical  -     Comprehensive metabolic panel; Future; Expected date: 05/16/2018  -     CBC auto differential; Future; Expected date: 05/16/2018  -     Hemoglobin A1c; Future; Expected date: 05/16/2018  -     Lipid panel; Future; Expected date: 05/16/2018  -     TSH; Future; Expected date: 05/16/2018  -     Vitamin D; Future; Expected date: 05/16/2018    Warning signs discussed, patient to call with any further issues or worsening of symptoms.

## 2018-05-16 NOTE — PATIENT INSTRUCTIONS
Diabetes Management Status    Statin: Taking  ACE/ARB: Taking    Screening or Prevention Patient's value Goal Complete/Controlled?   HgA1C Testing and Control   Lab Results   Component Value Date    HGBA1C 6.0 (H) 05/14/2018      Annually/Less than 8% Yes   Lipid profile : 11/08/2017 Annually Yes   LDL control Lab Results   Component Value Date    LDLCALC 66.4 11/08/2017    Annually/Less than 100 mg/dl  Yes   Nephropathy screening Lab Results   Component Value Date    LABMICR 7.0 05/14/2018     No results found for: PROTEINUA Annually Yes   Blood pressure BP Readings from Last 1 Encounters:   05/16/18 112/84    Less than 140/90 Yes   Dilated retinal exam : 01/02/2018 Annually Yes   Foot exam   : 12/29/2017 Annually Yes

## 2018-05-28 ENCOUNTER — TELEPHONE (OUTPATIENT)
Dept: FAMILY MEDICINE | Facility: CLINIC | Age: 63
End: 2018-05-28

## 2018-05-28 NOTE — TELEPHONE ENCOUNTER
----- Message from Sophia Mayda sent at 5/28/2018 11:22 AM CDT -----  Contact: self, [phone]  Patient states he received a coupon for a Freestyle glucose meter but states he needs a prescription from you. Please advise.

## 2018-05-29 DIAGNOSIS — R80.9 TYPE 2 DIABETES MELLITUS WITH MICROALBUMINURIA, WITHOUT LONG-TERM CURRENT USE OF INSULIN: ICD-10-CM

## 2018-05-29 DIAGNOSIS — E11.29 TYPE 2 DIABETES MELLITUS WITH MICROALBUMINURIA, WITHOUT LONG-TERM CURRENT USE OF INSULIN: ICD-10-CM

## 2018-05-29 RX ORDER — INSULIN PUMP SYRINGE, 3 ML
EACH MISCELLANEOUS
Qty: 1 EACH | Refills: 0 | Status: SHIPPED | OUTPATIENT
Start: 2018-05-29 | End: 2022-07-13

## 2018-05-29 RX ORDER — LANCETS
EACH MISCELLANEOUS
Qty: 100 EACH | Refills: 0 | Status: SHIPPED | OUTPATIENT
Start: 2018-05-29 | End: 2019-10-02 | Stop reason: SDUPTHER

## 2018-05-29 NOTE — TELEPHONE ENCOUNTER
----- Message from Judi Cuevas sent at 5/29/2018  2:21 PM CDT -----  Contact: Self/ [phone]  Patient received a voucher in the mail for a blood-glucose meter kit. But pharmacy is telling him he needs a prescription from the doctor.    Please call and advise.

## 2018-05-29 NOTE — TELEPHONE ENCOUNTER
I spoke with patient and confirmed pharmacy that he would like Rx to go.I advised patient a Rx will be sent over to [facility] on [address]. Patient voiced understanding.

## 2018-06-06 ENCOUNTER — CLINICAL SUPPORT (OUTPATIENT)
Dept: DIABETES | Facility: CLINIC | Age: 63
End: 2018-06-06
Payer: OTHER GOVERNMENT

## 2018-06-06 VITALS — WEIGHT: 173 LBS | BODY MASS INDEX: 27.92 KG/M2

## 2018-06-06 DIAGNOSIS — R80.9 TYPE 2 DIABETES MELLITUS WITH MICROALBUMINURIA, WITHOUT LONG-TERM CURRENT USE OF INSULIN: ICD-10-CM

## 2018-06-06 DIAGNOSIS — E11.29 TYPE 2 DIABETES MELLITUS WITH MICROALBUMINURIA, WITHOUT LONG-TERM CURRENT USE OF INSULIN: ICD-10-CM

## 2018-06-06 PROCEDURE — G0108 DIAB MANAGE TRN  PER INDIV: HCPCS | Mod: PBBFAC,PO | Performed by: REGISTERED NURSE

## 2018-06-06 PROCEDURE — 99999 PR PBB SHADOW E&M-EST. PATIENT-LVL I: CPT | Mod: PBBFAC,,,

## 2018-06-06 PROCEDURE — 99211 OFF/OP EST MAY X REQ PHY/QHP: CPT | Mod: PBBFAC,PO | Performed by: REGISTERED NURSE

## 2018-06-06 NOTE — LETTER
June 6, 2018      Chema Berrios, DO  [address]  [place] [address]         Patient: Trevor Johnston   MR Number: [id number]   YOB: 1955   Date of Visit: 6/6/2018       Dear Dr. Berrios:    Thank you for referring Trevor for diabetes self-management education and support. He has completed all components of our Diabetes Management Program and his Self-Management Support Plan. Below is a summary of his clinical outcomes and goal progress.    Patient Outcomes:    A1c Status:   Lab Results   Component Value Date    HGBA1C 6.0 (H) 05/14/2018    HGBA1C 6.3 (H) 02/01/2018     Goals  Patient has selected/evaluated goals during today's session: Yes, evaluated  Healthy Eating: % Met  Met Percentage : 75%    Diabetes Self-Management Support Plan  Diabetes Learning: DM websites  Review Status: Patient has selected and agrees to support plan., Patient was provided Diabetes Self-Management Support Plan document that includes support options.    Follow up:   · Trevor to follow diabetes support plan indicated above  · Trevor to attend medical appointments as scheduled  · Trevor to update you on his DM education progress as needed      If you have questions, please do not hesitate to call me. I look forward to providing additional education and support as needed.    Sincerely,    Marylu Crum RN

## 2018-06-06 NOTE — PROGRESS NOTES
Diabetes Education  Author: Marylu Crum RN  Date: 6/6/2018    Diabetes Education Visit  Diabetes Education Record Assessment/Progress: Post Program/Follow-up  Diabetes Type  Diabetes Type : Type II  Nutrition  Meal Planning: water, 3 meals per day, eats out seldom, snacks between meal  What type of beverages do you drink?: water  Meal Plan 24 Hour Recall - Breakfast: 1 1/2 boiled eggs, 4 strawberries, coffee, breakfast chips  Meal Plan 24 Hour Recall - Lunch: 1 1/2 turkey sandwiches, crackers,   Meal Plan 24 Hour Recall - Dinner: yellow rice, pork. cabbage, 1/2 plaintain  Meal Plan 24 Hour Recall - Snack: yogurt, fruit  Monitoring   Self Monitoring : pt has not received his meter yet in the mail. He states he should receive it this week. pt will test once a day fasting in the am.  Do you use a personal glucose monitor?: No  In the last month, how often have you had a low blood sugar reaction?: never  Can you tell when your blood sugar is too high?: no  Exercise   Exercise Type: walking  Intensity: Low  Frequency: 3-5 Times per week  Duration: 15 min  Current Diabetes Treatment   Current Treatment: Oral Medication, Diet, Exercise  Social History  Preferred Learning Method: Face to Face, Reading Materials  Primary Support: Spouse  Barriers to Change  Barriers to Change: None  Learning Challenges : None  Readiness to Learn   Readiness to Learn : Acceptance  Cultural Influences  Cultural Influences: Yes  If yes, please list:: latin  Diabetes Education Assessment/Progress  Diabetes Disease Process (diabetes disease process and treatment options): Discussion, Instructed, Individual Session, Demonstrates Understanding/Competency(verbalizes/demonstrates)  Nutrition (Incorporating nutritional management into one's lifestyle): Discussion, Lecture, Instructed, Individual Session, Demonstrates Understanding/Competency (verbalizes/demonstrates), Written Materials Provided  Physical Activity (incorporating physical activity  into one's lifestyle): Discussion, Instructed, Individual Session, Demonstrates Understanding/Competency (verbalizes/demonstrates)  Medications (states correct name, dose, onset, peak, duration, side effects & timing of meds): Discussion, Instructed, Individual Session, Demonstrates Understanding/Competency(verbalizes/demonstrates)  Monitoring (monitoring blood glucose/other parameters & using results): Discussion, Instructed, Individual Session, Demonstrates Understanding/Competency (verbalizes/demonstrates)  Acute Complications (preventing, detecting, and treating acute complications): Discussion, Instructed, Individual Session, Demonstrates Understanding/Competency (verbalizes/demonstrates)  Chronic Complications (preventing, detecting, and treating chronic complications): Not Covered/Deferred  Clinical (diabetes, other pertinent medical history, and relevant comorbidities reviewed during visit): Not Covered/Deferred  Cognitive (knowledge of self-management skills, functional health literacy): Instructed, Discussion, Individual Session, Demonstrates Understanding/Competency (verbalizes/demonstrates)  Psychosocial (emotional response to diabetes): Discussion, Instructed, Individual Session, Demonstrates Understanding/Competency (verbalizes/demonstrates)  Diabetes Distress and Support Systems: Discussion, Instructed, Individual Session, Demonstrates Understanding/Competency (verbalizes/demonstrates)  Behavioral (readiness for change, lifestyle practices, self-care behaviors): Discussion, Instructed, Individual Session, Demonstrates Understanding/Competency (verbalizes/demonstrates)  Goals  Patient has selected/evaluated goals during today's session: Yes, evaluated  Healthy Eating: % Met  Met Percentage : 75%  Diabetes Self-Management Support Plan  Diabetes Learning: DM websites  Review Status: Patient has selected and agrees to support plan., Patient was provided Diabetes Self-Management Support Plan document that  includes support options.  Diabetes Meal Plan  Restrictions: Restricted Carbohydrate  Calories: 1800  Carbohydrate Per Meal: 45-60g  Carbohydrate Per Snack : 15-20g  24 hour meal recall showed that pt has cut his portions, he is eating healthier and has lost a little weight,. Discussed with pt that he is a little low on carbs in his meals and he could add another serving. Pt reports that he feels hungry a lot. Reviewed the food groups, counting carbs and how to put meals together with pt. Pt was advised to call for any problems or questions. Pt to return to clinic as needed.  Education Units of Time   Time Spent: 60 min    Health Maintenance was reviewed today with patient. Discussed with patient importance of routine eye exams, foot exams/foot care, blood work (i.e.: A1c, microalbumin, and lipid), dental visits, yearly flu vaccine, and pneumonia vaccine as indicated by PCP. Patient verbalized understanding.     Health Maintenance Topics with due status: Not Due       Topic Last Completion Date    Influenza Vaccine 10/09/2017    Lipid Panel 11/08/2017    Foot Exam 12/29/2017    Eye Exam 01/02/2018    Fecal Occult Blood Test (FOBT)/FitKit 01/15/2018    Hemoglobin A1c 05/14/2018    Low Dose Statin 05/16/2018     Health Maintenance Due   Topic Date Due    TETANUS VACCINE  10/07/1973    Pneumococcal PPSV23 (Medium Risk) (1) 10/07/1973    Zoster Vaccine  10/07/2015

## 2018-09-18 ENCOUNTER — LAB VISIT (OUTPATIENT)
Dept: LAB | Facility: HOSPITAL | Age: 63
End: 2018-09-18
Attending: FAMILY MEDICINE
Payer: OTHER GOVERNMENT

## 2018-09-18 DIAGNOSIS — Z00.00 VISIT FOR WELL MAN HEALTH CHECK: ICD-10-CM

## 2018-09-18 LAB
25(OH)D3+25(OH)D2 SERPL-MCNC: 54 NG/ML
ALBUMIN SERPL BCP-MCNC: 4 G/DL
ALP SERPL-CCNC: 91 U/L
ALT SERPL W/O P-5'-P-CCNC: 59 U/L
ANION GAP SERPL CALC-SCNC: 5 MMOL/L
AST SERPL-CCNC: 40 U/L
BASOPHILS # BLD AUTO: 0.07 K/UL
BASOPHILS NFR BLD: 0.8 %
BILIRUB SERPL-MCNC: 0.6 MG/DL
BUN SERPL-MCNC: 16 MG/DL
CALCIUM SERPL-MCNC: 9.8 MG/DL
CHLORIDE SERPL-SCNC: 108 MMOL/L
CHOLEST SERPL-MCNC: 119 MG/DL
CHOLEST/HDLC SERPL: 3.2 {RATIO}
CO2 SERPL-SCNC: 29 MMOL/L
CREAT SERPL-MCNC: 0.8 MG/DL
DIFFERENTIAL METHOD: ABNORMAL
EOSINOPHIL # BLD AUTO: 0.2 K/UL
EOSINOPHIL NFR BLD: 2.4 %
ERYTHROCYTE [DISTWIDTH] IN BLOOD BY AUTOMATED COUNT: 12.8 %
EST. GFR  (AFRICAN AMERICAN): >60 ML/MIN/1.73 M^2
EST. GFR  (NON AFRICAN AMERICAN): >60 ML/MIN/1.73 M^2
ESTIMATED AVG GLUCOSE: 137 MG/DL
GLUCOSE SERPL-MCNC: 125 MG/DL
HBA1C MFR BLD HPLC: 6.4 %
HCT VFR BLD AUTO: 41 %
HDLC SERPL-MCNC: 37 MG/DL
HDLC SERPL: 31.1 %
HGB BLD-MCNC: 13.4 G/DL
IMM GRANULOCYTES # BLD AUTO: 0.02 K/UL
IMM GRANULOCYTES NFR BLD AUTO: 0.2 %
LDLC SERPL CALC-MCNC: 65.8 MG/DL
LYMPHOCYTES # BLD AUTO: 2.1 K/UL
LYMPHOCYTES NFR BLD: 25.8 %
MCH RBC QN AUTO: 30 PG
MCHC RBC AUTO-ENTMCNC: 32.7 G/DL
MCV RBC AUTO: 92 FL
MONOCYTES # BLD AUTO: 0.7 K/UL
MONOCYTES NFR BLD: 8.7 %
NEUTROPHILS # BLD AUTO: 5.1 K/UL
NEUTROPHILS NFR BLD: 62.1 %
NONHDLC SERPL-MCNC: 82 MG/DL
NRBC BLD-RTO: 0 /100 WBC
PLATELET # BLD AUTO: 233 K/UL
PMV BLD AUTO: 9.9 FL
POTASSIUM SERPL-SCNC: 4.7 MMOL/L
PROT SERPL-MCNC: 7.4 G/DL
RBC # BLD AUTO: 4.47 M/UL
SODIUM SERPL-SCNC: 142 MMOL/L
TRIGL SERPL-MCNC: 81 MG/DL
TSH SERPL DL<=0.005 MIU/L-ACNC: 1.8 UIU/ML
WBC # BLD AUTO: 8.29 K/UL

## 2018-09-18 PROCEDURE — 36415 COLL VENOUS BLD VENIPUNCTURE: CPT | Mod: PO

## 2018-09-18 PROCEDURE — 80061 LIPID PANEL: CPT

## 2018-09-18 PROCEDURE — 85025 COMPLETE CBC W/AUTO DIFF WBC: CPT

## 2018-09-18 PROCEDURE — 82306 VITAMIN D 25 HYDROXY: CPT

## 2018-09-18 PROCEDURE — 84443 ASSAY THYROID STIM HORMONE: CPT

## 2018-09-18 PROCEDURE — 80053 COMPREHEN METABOLIC PANEL: CPT

## 2018-09-18 PROCEDURE — 83036 HEMOGLOBIN GLYCOSYLATED A1C: CPT

## 2018-09-20 ENCOUNTER — OFFICE VISIT (OUTPATIENT)
Dept: FAMILY MEDICINE | Facility: CLINIC | Age: 63
End: 2018-09-20
Payer: OTHER GOVERNMENT

## 2018-09-20 VITALS
HEIGHT: 66 IN | SYSTOLIC BLOOD PRESSURE: 104 MMHG | DIASTOLIC BLOOD PRESSURE: 72 MMHG | HEART RATE: 61 BPM | OXYGEN SATURATION: 95 % | BODY MASS INDEX: 28.74 KG/M2 | TEMPERATURE: 98 F | WEIGHT: 178.81 LBS

## 2018-09-20 DIAGNOSIS — K64.8 OTHER HEMORRHOIDS: ICD-10-CM

## 2018-09-20 DIAGNOSIS — E78.5 HYPERLIPIDEMIA, UNSPECIFIED HYPERLIPIDEMIA TYPE: ICD-10-CM

## 2018-09-20 DIAGNOSIS — E11.29 TYPE 2 DIABETES MELLITUS WITH MICROALBUMINURIA, WITHOUT LONG-TERM CURRENT USE OF INSULIN: ICD-10-CM

## 2018-09-20 DIAGNOSIS — Z23 NEED FOR INFLUENZA VACCINATION: ICD-10-CM

## 2018-09-20 DIAGNOSIS — D64.9 NORMOCYTIC ANEMIA: ICD-10-CM

## 2018-09-20 DIAGNOSIS — Z00.00 VISIT FOR WELL MAN HEALTH CHECK: Primary | ICD-10-CM

## 2018-09-20 DIAGNOSIS — Z87.891 FORMER CIGARETTE SMOKER: ICD-10-CM

## 2018-09-20 DIAGNOSIS — Z23 NEED FOR VACCINATION AGAINST STREPTOCOCCUS PNEUMONIAE: ICD-10-CM

## 2018-09-20 DIAGNOSIS — E11.59 HYPERTENSION ASSOCIATED WITH DIABETES: ICD-10-CM

## 2018-09-20 DIAGNOSIS — I15.2 HYPERTENSION ASSOCIATED WITH DIABETES: ICD-10-CM

## 2018-09-20 DIAGNOSIS — N20.0 KIDNEY STONES: ICD-10-CM

## 2018-09-20 DIAGNOSIS — Z23 NEED FOR DIPHTHERIA-TETANUS-PERTUSSIS (TDAP) VACCINE: ICD-10-CM

## 2018-09-20 DIAGNOSIS — R80.9 TYPE 2 DIABETES MELLITUS WITH MICROALBUMINURIA, WITHOUT LONG-TERM CURRENT USE OF INSULIN: ICD-10-CM

## 2018-09-20 PROCEDURE — 99999 PR PBB SHADOW E&M-EST. PATIENT-LVL III: CPT | Mod: PBBFAC,,, | Performed by: FAMILY MEDICINE

## 2018-09-20 PROCEDURE — 90471 IMMUNIZATION ADMIN: CPT | Mod: PBBFAC,PO

## 2018-09-20 PROCEDURE — 99396 PREV VISIT EST AGE 40-64: CPT | Mod: S$PBB,,, | Performed by: FAMILY MEDICINE

## 2018-09-20 PROCEDURE — 90732 PPSV23 VACC 2 YRS+ SUBQ/IM: CPT | Mod: PBBFAC,PO

## 2018-09-20 PROCEDURE — 99213 OFFICE O/P EST LOW 20 MIN: CPT | Mod: PBBFAC,PO | Performed by: FAMILY MEDICINE

## 2018-09-20 PROCEDURE — 90715 TDAP VACCINE 7 YRS/> IM: CPT | Mod: PBBFAC,PO

## 2018-09-20 PROCEDURE — 90472 IMMUNIZATION ADMIN EACH ADD: CPT | Mod: PBBFAC,PO

## 2018-09-20 RX ORDER — IRBESARTAN 150 MG/1
150 TABLET ORAL DAILY
Qty: 90 TABLET | Refills: 1 | Status: SHIPPED | OUTPATIENT
Start: 2018-09-20 | End: 2019-04-08 | Stop reason: SDUPTHER

## 2018-09-20 RX ORDER — ALLOPURINOL 300 MG/1
300 TABLET ORAL DAILY
Qty: 90 TABLET | Refills: 1 | Status: SHIPPED | OUTPATIENT
Start: 2018-09-20 | End: 2019-04-08 | Stop reason: SDUPTHER

## 2018-09-20 RX ORDER — METFORMIN HYDROCHLORIDE 500 MG/1
500 TABLET, EXTENDED RELEASE ORAL DAILY
Qty: 90 TABLET | Refills: 1 | Status: SHIPPED | OUTPATIENT
Start: 2018-09-20 | End: 2019-03-28 | Stop reason: SDUPTHER

## 2018-09-20 RX ORDER — ATORVASTATIN CALCIUM 40 MG/1
40 TABLET, FILM COATED ORAL DAILY
Qty: 90 TABLET | Refills: 1 | Status: SHIPPED | OUTPATIENT
Start: 2018-09-20 | End: 2019-04-08 | Stop reason: SDUPTHER

## 2018-09-20 NOTE — PATIENT INSTRUCTIONS
CALL IF RECTAL BLEEDING WORSENS    ?Avoid tobacco  ?Be physically active  ?Maintain a healthy weight  ?Eat a diet rich in fruits, vegetables, and whole grains, and low in saturated/trans fat  ?Limit alcohol consumption  ?Protect against sexually transmitted infections  ?Avoid excess sun    Please decrease your intake of white bread, white rice, corn, pasta, potatoes and sugar to prevent worsening diabetes. Regular daily exercise will also decrease your risk of developing worsening  Diabetes.    Diabetes health maintenance:  -- advise regular and consistent glucose monitoring and medication compliance.  -- advise daily foot checks  -- advise yearly ophthalmologic exams  -- advise adequate dietary and exercise modification  -- advise regular dental visits  -- Medication management    Diabetes Management Status    Statin: Taking  ACE/ARB: Taking    Screening or Prevention Patient's value Goal Complete/Controlled?   HgA1C Testing and Control   Lab Results   Component Value Date    HGBA1C 6.4 (H) 09/18/2018      Annually/Less than 8% Yes   Lipid profile : 09/18/2018 Annually Yes   LDL control Lab Results   Component Value Date    LDLCALC 65.8 09/18/2018    Annually/Less than 100 mg/dl  Yes   Nephropathy screening Lab Results   Component Value Date    LABMICR 7.0 05/14/2018     No results found for: PROTEINUA Annually Yes   Blood pressure BP Readings from Last 1 Encounters:   09/20/18 104/72    Less than 140/90 Yes   Dilated retinal exam : 01/02/2018 Annually Yes   Foot exam   : 12/29/2017 Annually Yes

## 2018-09-20 NOTE — PROGRESS NOTES
"Subjective:       Patient ID: Trevor Johnston is a 62 y.o. male.    Chief Complaint: Annual Exam    Trevor Johnston is a 62 y.o. male who presents today for a physical.     Diet: he has not been watching his portions. He has been eating cereal at night. He had lost about 10 pounds but has gained 5 since the last visit. He has has still been active but no formal routine. He has been eating at his mother's house a lot; this is a lot of fried food, steaks, fat.   HTN: stable. No headaches. Is tolerating avapro with no issues.   DLD: tolerating statin. No myalgias.     He has a history of hemorrhoids. Colonoscopy was normal in 2017. He has intermittent bleeding. He had bleeding also before the colonoscopy; this has been ongoing for "years." He notes bleeding in the toilet. Last episode was this morning. When he eats more salads it seems to be worse. He denies any straining.     Flu: ordered  Tdap: ordered  Pneumonia: ordered  Labs: reviewed    C-scope: 2017, records in media tab     PMHx: reviewed in EMR and updated  Meds: reviewed in EMR and updated  Shx: reviewed in EMR and updated  FMHx: no family history of colon cancer, breast cancer, ovarian cancer  Social: he is retired. He used to work as a  for 10 years and also an . The last 10 years he was an  for [employer]. He lives with his wife. He has a poodle at home. He has two kids; they live nearby.       Review of Systems   Constitutional: Negative for activity change and unexpected weight change.   HENT: Negative for hearing loss, rhinorrhea and trouble swallowing.    Eyes: Negative for discharge and visual disturbance.   Respiratory: Negative for chest tightness and wheezing.    Cardiovascular: Negative for chest pain and palpitations.   Gastrointestinal: Positive for blood in stool. Negative for constipation, diarrhea and vomiting.   Endocrine: Negative for polydipsia and polyuria.   Genitourinary: Negative for difficulty urinating, " hematuria and urgency.   Musculoskeletal: Negative for arthralgias, joint swelling and neck pain.   Neurological: Negative for weakness and headaches.   Psychiatric/Behavioral: Negative for confusion and dysphoric mood.         Health Maintenance Due   Topic Date Due    Zoster Vaccine  10/07/2015       Objective:     Vitals:    09/20/18 1309   BP: 104/72   Pulse: 61   Temp: 97.9 °F (36.6 °C)        Physical Exam   Constitutional: He is oriented to person, place, and time. He appears well-developed and well-nourished.   HENT:   Head: Normocephalic and atraumatic.   Right Ear: Tympanic membrane, external ear and ear canal normal.   Left Ear: Tympanic membrane, external ear and ear canal normal.   Nose: Nose normal.   Mouth/Throat: Oropharynx is clear and moist and mucous membranes are normal. No tonsillar exudate.   Eyes: Conjunctivae are normal. Pupils are equal, round, and reactive to light.   Neck: Normal range of motion. Neck supple.   Cardiovascular: Normal rate, regular rhythm and normal heart sounds.   Pulmonary/Chest: Effort normal and breath sounds normal.   Abdominal: Soft. Bowel sounds are normal. He exhibits no distension.   Musculoskeletal: He exhibits no edema.   Neurological: He is alert and oriented to person, place, and time.   Skin: Skin is warm and dry.   Psychiatric: He has a normal mood and affect. His speech is normal and behavior is normal. Judgment and thought content normal.   Nursing note and vitals reviewed.      Assessment:       1. Visit for well man health check    2. Hypertension associated with diabetes    3. Type 2 diabetes mellitus with microalbuminuria, without long-term current use of insulin    4. Hyperlipidemia, unspecified hyperlipidemia type    5. BMI 28.0-28.9,adult    6. Need for diphtheria-tetanus-pertussis (Tdap) vaccine    7. Need for influenza vaccination    8. Need for vaccination against Streptococcus pneumoniae    9. Other hemorrhoids    10. Former cigarette smoker     11. Kidney stones    12. Normocytic anemia        Plan:         Visit for [facility] health check  Reviewed labs in detail  ?Avoid tobacco  ?Be physically active  ?Maintain a healthy weight  ?Eat a diet rich in fruits, vegetables, and whole grains, and low in saturated/trans fat  ?Limit alcohol consumption  ?Protect against sexually transmitted infections  ?Avoid excess sun    Hypertension associated with diabetes  Continue avapro  controlled  -     Comprehensive metabolic panel; Future; Expected date: 09/20/2018  -     irbesartan (AVAPRO) 150 MG tablet; Take 1 tablet (150 mg total) by mouth once daily.  Dispense: 90 tablet; Refill: 1    Type 2 diabetes mellitus with microalbuminuria, without long-term current use of insulin  Diet changes  Continue metformin 500 mg  NO increase for now  -     Hemoglobin A1c; Future; Expected date: 09/20/2018  -     metFORMIN (GLUCOPHAGE-XR) 500 MG 24 hr tablet; Take 1 tablet (500 mg total) by mouth once daily.  Dispense: 90 tablet; Refill: 1    Hyperlipidemia, unspecified hyperlipidemia type  Continue statin  -     atorvastatin (LIPITOR) 40 MG tablet; Take 1 tablet (40 mg total) by mouth once daily.  Dispense: 90 tablet; Refill: 1    BMI 28.0-28.9,adult  See AVS    Need for diphtheria-tetanus-pertussis (Tdap) vaccine  -     Tdap Vaccine    Need for influenza vaccination  -     Influenza - Quadrivalent (3 years & older) (PF)    Need for vaccination against Streptococcus pneumoniae  -     (In Office Administered) Pneumococcal Polysaccharide Vaccine (23 Valent) (SQ/IM)    Other hemorrhoids/Normocytic anemia  Noted on c-scope, This was about 1 year ago - see under media tab  Monitor CBC in 4 months  If this anemia persists, may need to treat with banding  Continue monitoring closely  -     CBC auto differential; Future; Expected date: 09/20/2018    Former cigarette smoker    Kidney stones  -     allopurinol (ZYLOPRIM) 300 MG tablet; Take 1 tablet (300 mg total) by mouth once daily.   Dispense: 90 tablet; Refill: 1    Warning signs discussed, patient to call with any further issues or worsening of symptoms.

## 2018-09-20 NOTE — PROGRESS NOTES
Flu vaccine given as ordered to RD; Pt tolerated well. Bandaid applied to site.  Immunization given as ordered; Pt tolerated well. Bandaid applied to site.

## 2018-11-01 ENCOUNTER — TELEPHONE (OUTPATIENT)
Dept: FAMILY MEDICINE | Facility: CLINIC | Age: 63
End: 2018-11-01

## 2018-11-01 DIAGNOSIS — H35.30 MACULAR DEGENERATION, UNSPECIFIED LATERALITY, UNSPECIFIED TYPE: Primary | ICD-10-CM

## 2018-11-29 ENCOUNTER — TELEPHONE (OUTPATIENT)
Dept: FAMILY MEDICINE | Facility: CLINIC | Age: 63
End: 2018-11-29

## 2018-11-29 NOTE — TELEPHONE ENCOUNTER
----- Message from Martha Rand sent at 11/29/2018  2:27 PM CST -----  Contact: Self [phone]  Patient would like to speak with you about getting a referral for a PCP in the [facility]. Please advise

## 2018-11-30 ENCOUNTER — TELEPHONE (OUTPATIENT)
Dept: FAMILY MEDICINE | Facility: CLINIC | Age: 63
End: 2018-11-30

## 2018-11-30 NOTE — TELEPHONE ENCOUNTER
----- Message from Bhakti Larios sent at 11/30/2018 10:57 AM CST -----  Contact: self/[phone]  Patient is returning your call in regard to getting a suggestion from the doctor for one on the [facility].      Please call and advise.

## 2018-12-28 ENCOUNTER — OFFICE VISIT (OUTPATIENT)
Dept: FAMILY MEDICINE | Facility: CLINIC | Age: 63
End: 2018-12-28
Payer: OTHER GOVERNMENT

## 2018-12-28 VITALS
WEIGHT: 185 LBS | TEMPERATURE: 98 F | DIASTOLIC BLOOD PRESSURE: 78 MMHG | BODY MASS INDEX: 29.73 KG/M2 | HEIGHT: 66 IN | OXYGEN SATURATION: 98 % | SYSTOLIC BLOOD PRESSURE: 110 MMHG | HEART RATE: 86 BPM

## 2018-12-28 DIAGNOSIS — I15.2 HYPERTENSION ASSOCIATED WITH DIABETES: ICD-10-CM

## 2018-12-28 DIAGNOSIS — E78.5 HYPERLIPIDEMIA ASSOCIATED WITH TYPE 2 DIABETES MELLITUS: ICD-10-CM

## 2018-12-28 DIAGNOSIS — E11.59 HYPERTENSION ASSOCIATED WITH DIABETES: ICD-10-CM

## 2018-12-28 DIAGNOSIS — E11.69 HYPERLIPIDEMIA ASSOCIATED WITH TYPE 2 DIABETES MELLITUS: ICD-10-CM

## 2018-12-28 DIAGNOSIS — M25.562 LEFT KNEE PAIN, UNSPECIFIED CHRONICITY: Primary | ICD-10-CM

## 2018-12-28 DIAGNOSIS — N20.0 KIDNEY STONE: ICD-10-CM

## 2018-12-28 DIAGNOSIS — E11.8 TYPE 2 DIABETES MELLITUS WITH COMPLICATION, WITHOUT LONG-TERM CURRENT USE OF INSULIN: Primary | ICD-10-CM

## 2018-12-28 DIAGNOSIS — M25.562 LEFT KNEE PAIN, UNSPECIFIED CHRONICITY: ICD-10-CM

## 2018-12-28 PROCEDURE — 99214 OFFICE O/P EST MOD 30 MIN: CPT | Mod: S$GLB,,, | Performed by: FAMILY MEDICINE

## 2018-12-28 RX ORDER — ROPINIROLE 0.5 MG/1
0.5 TABLET, FILM COATED ORAL NIGHTLY
Qty: 90 TABLET | Refills: 1 | Status: SHIPPED | OUTPATIENT
Start: 2018-12-28 | End: 2019-07-15 | Stop reason: SDUPTHER

## 2018-12-28 NOTE — PROGRESS NOTES
Subjective:       Patient ID: Trevor Johnston is a 63 y.o. male.    Chief Complaint: Establish Care    HPI   The patient is a 63-year-old who is here today to establish care with me.  He is moving his medical care to the [place] now that he lives in this area.  Today we discussed the followin) diabetes.  His recent A1c was 6.4.  He is taking Glucophage 500 mg once a day.  He does not check his sugars outside of the office.  He has not felt as if his blood sugars have been high or low    2) hypertension.  He is taking Avapro 150 mg once a day.  Today's blood pressure is 110/78.  He has not been feeling as if his blood pressures have been high or low  3) hyperlipidemia.  He is currently taking Lipitor 40 mg once a day.  His last LDL was 65.  He is tolerating the Lipitor well with no problems  4) history of nephrolithiasis.  He has had multiple kidney stones in the past.  He is on allopurinol for kidney stone prevention  5) macular degeneration.  He does see in and eye doctor Dr. Ayan Covarrubias for injections  6) knee pain. He has been having left-sided knee pain for 2 months.  This started in mid October and has gradually gotten worse.  It is particularly bad with stairs.     He localizes the pain to the lateral aspect of his knee.  He denies any swelling stiffness locking popping or giving way.  He describes the pain as spasming type of pain.  He denies any specific injury    Review of systems is remarkable for restless legs at night.  His wife reports that his legs jump all night    Review of Systems   Constitutional: Negative for appetite change, chills, diaphoresis, fatigue, fever and unexpected weight change.   HENT: Negative for congestion, ear pain, postnasal drip, rhinorrhea, sinus pressure, sneezing, sore throat and trouble swallowing.    Eyes: Negative for pain, discharge and visual disturbance.   Respiratory: Negative for cough, chest tightness, shortness of breath and wheezing.    Cardiovascular:  Negative for chest pain, palpitations and leg swelling.   Gastrointestinal: Negative for abdominal distention, abdominal pain, blood in stool, constipation, diarrhea, nausea and vomiting.   Musculoskeletal:        + left knee pain   Skin: Negative for rash.       Objective:      Physical Exam   Constitutional: He is oriented to person, place, and time. He appears well-developed and well-nourished. No distress.   HENT:   Head: Normocephalic and atraumatic.   Right Ear: Hearing, tympanic membrane, external ear and ear canal normal.   Left Ear: Hearing, tympanic membrane, external ear and ear canal normal.   Nose: Nose normal.   Mouth/Throat: Oropharynx is clear and moist and mucous membranes are normal. No oral lesions. No oropharyngeal exudate, posterior oropharyngeal edema or posterior oropharyngeal erythema.   Eyes: Conjunctivae, EOM and lids are normal. Pupils are equal, round, and reactive to light. No scleral icterus.   Neck: Normal range of motion. Neck supple. Carotid bruit is not present. No thyroid mass and no thyromegaly present.   Cardiovascular: Normal rate, regular rhythm and normal heart sounds.  No extrasystoles are present. PMI is not displaced. Exam reveals no gallop.   No murmur heard.  Pulmonary/Chest: Effort normal and breath sounds normal. No accessory muscle usage. No respiratory distress.   Clear to auscultation bilaterally.   Abdominal: Soft. Normal appearance and bowel sounds are normal. He exhibits no abdominal bruit. There is no hepatosplenomegaly. There is no tenderness. There is no rebound.   Musculoskeletal:   LLE NVI.  Knee with normal ROM, no swelling and no crepitance.  No ligamentous instability.  No tenderness to palpation today.   Lymphadenopathy:        Head (right side): No submental and no submandibular adenopathy present.        Head (left side): No submental and no submandibular adenopathy present.        Right cervical: No superficial cervical, no deep cervical and no  "posterior cervical adenopathy present.       Left cervical: No superficial cervical, no deep cervical and no posterior cervical adenopathy present.        Right: No supraclavicular adenopathy present.        Left: No supraclavicular adenopathy present.   Neurological: He is alert and oriented to person, place, and time.   Skin: Skin is warm, dry and intact.   Psychiatric: He has a normal mood and affect.     Blood pressure 110/78, pulse 86, temperature 98.2 °F (36.8 °C), temperature source Oral, height 5' 6" (1.676 m), weight 83.9 kg (185 lb), SpO2 98 %.Body mass index is 29.86 kg/m².          A/P:  1) diabetes.  New to me.  Well controlled.  We will recheck an A1c in March.  Continue with Glucophage  2)  Hypertension.  New to me.  Well controlled.  Continue with Avapro.    3) hyperlipidemia.  New to me.  Well controlled.  Continue with Lipitor.  We will recheck fasting labs in March  4) history of nephrolithiasis.    New to me.  Asymptomatic.  Continue with allopurinol  5) macular degeneration.  New to me.  We will request records  6)  Left knee pain.  New to me.  We will refer him to the orthopedist for further evaluation  7) RLS.  New to me.  We will try Requip and increase this dose as needed      8) health maintenance issues.  He was given orders for shingrix    "

## 2019-01-02 ENCOUNTER — HOSPITAL ENCOUNTER (OUTPATIENT)
Dept: RADIOLOGY | Facility: HOSPITAL | Age: 64
Discharge: HOME | End: 2019-01-02
Attending: ORTHOPAEDIC SURGERY
Payer: OTHER GOVERNMENT

## 2019-01-02 ENCOUNTER — OFFICE VISIT (OUTPATIENT)
Dept: ORTHOPEDICS | Facility: CLINIC | Age: 64
End: 2019-01-02
Payer: OTHER GOVERNMENT

## 2019-01-02 VITALS
SYSTOLIC BLOOD PRESSURE: 123 MMHG | DIASTOLIC BLOOD PRESSURE: 74 MMHG | HEIGHT: 66 IN | HEART RATE: 80 BPM | BODY MASS INDEX: 29.73 KG/M2 | WEIGHT: 185 LBS

## 2019-01-02 DIAGNOSIS — M25.562 LEFT KNEE PAIN, UNSPECIFIED CHRONICITY: ICD-10-CM

## 2019-01-02 DIAGNOSIS — M17.12 ARTHRITIS OF KNEE, LEFT: Primary | ICD-10-CM

## 2019-01-02 PROCEDURE — 73564 X-RAY EXAM KNEE 4 OR MORE: CPT | Mod: 26,LT,, | Performed by: RADIOLOGY

## 2019-01-02 PROCEDURE — 99243 OFF/OP CNSLTJ NEW/EST LOW 30: CPT | Mod: 25,S$PBB,, | Performed by: ORTHOPAEDIC SURGERY

## 2019-01-02 PROCEDURE — 99999 PR PBB SHADOW E&M-EST. PATIENT-LVL III: CPT | Mod: PBBFAC,,, | Performed by: ORTHOPAEDIC SURGERY

## 2019-01-02 PROCEDURE — 20610 LARGE JOINT ASPIRATION/INJECTION: L KNEE: ICD-10-PCS | Mod: S$PBB,LT,, | Performed by: ORTHOPAEDIC SURGERY

## 2019-01-02 PROCEDURE — 20610 DRAIN/INJ JOINT/BURSA W/O US: CPT | Mod: PBBFAC,PN | Performed by: ORTHOPAEDIC SURGERY

## 2019-01-02 PROCEDURE — 99213 OFFICE O/P EST LOW 20 MIN: CPT | Mod: PBBFAC,25,PN | Performed by: ORTHOPAEDIC SURGERY

## 2019-01-02 PROCEDURE — 73562 X-RAY EXAM OF KNEE 3: CPT | Mod: TC,PO,LT

## 2019-01-02 PROCEDURE — 73562 XR KNEE ORTHO LEFT WITH FLEXION: ICD-10-PCS | Mod: 26,59,LT, | Performed by: RADIOLOGY

## 2019-01-02 PROCEDURE — 73564 XR KNEE ORTHO LEFT WITH FLEXION: ICD-10-PCS | Mod: 26,LT,, | Performed by: RADIOLOGY

## 2019-01-02 PROCEDURE — 99243 PR OFFICE CONSULTATION,LEVEL III: ICD-10-PCS | Mod: 25,S$PBB,, | Performed by: ORTHOPAEDIC SURGERY

## 2019-01-02 PROCEDURE — 99999 PR PBB SHADOW E&M-EST. PATIENT-LVL III: ICD-10-PCS | Mod: PBBFAC,,, | Performed by: ORTHOPAEDIC SURGERY

## 2019-01-02 PROCEDURE — 73562 X-RAY EXAM OF KNEE 3: CPT | Mod: 26,59,LT, | Performed by: RADIOLOGY

## 2019-01-02 RX ORDER — TRIAMCINOLONE ACETONIDE 40 MG/ML
40 INJECTION, SUSPENSION INTRA-ARTICULAR; INTRAMUSCULAR
Status: DISCONTINUED | OUTPATIENT
Start: 2019-01-02 | End: 2019-01-02 | Stop reason: HOSPADM

## 2019-01-02 RX ADMIN — TRIAMCINOLONE ACETONIDE 40 MG: 40 INJECTION, SUSPENSION INTRA-ARTICULAR; INTRAMUSCULAR at 02:01

## 2019-01-02 NOTE — PROCEDURES
Large Joint Aspiration/Injection: L knee  Date/Time: 1/2/2019 2:52 PM  Performed by: Andrés Appiah MD  Authorized by: Andrés Appiah MD     Consent Done?:  Yes (Verbal)  Indications:  Pain  Procedure site marked: Yes    Timeout: Prior to procedure the correct patient, procedure, and site was verified      Location:  Knee  Site:  L knee  Prep: Patient was prepped and draped in usual sterile fashion    Needle size:  20 G  Approach:  Anterolateral  Medications:  40 mg triamcinolone acetonide 40 mg/mL  Patient tolerance:  Patient tolerated the procedure well with no immediate complications

## 2019-01-02 NOTE — PROGRESS NOTES
Past Medical History:   Diagnosis Date    Diabetes mellitus, type 2     Hyperlipidemia     Hypertension     Kidney stones     multiple episodes - on allopurinol    Macular degeneration, right eye     ROBERT (obstructive sleep apnea)     RLS (restless legs syndrome)        Past Surgical History:   Procedure Laterality Date    CATARACT EXTRACTION W/  INTRAOCULAR LENS IMPLANT Right 02/2017    ROTATOR CUFF REPAIR Right     TRIGGER FINGER RELEASE Bilateral        Current Outpatient Medications   Medication Sig    allopurinol (ZYLOPRIM) 300 MG tablet Take 1 tablet (300 mg total) by mouth once daily.    atorvastatin (LIPITOR) 40 MG tablet Take 1 tablet (40 mg total) by mouth once daily.    blood sugar diagnostic Strp To check BG one times daily, to use with insurance preferred meter    blood-glucose meter kit To check BG one times daily, to use with insurance preferred meter    irbesartan (AVAPRO) 150 MG tablet Take 1 tablet (150 mg total) by mouth once daily.    lancets Misc To check BG 1 times daily, to use with insurance preferred meter    metFORMIN (GLUCOPHAGE-XR) 500 MG 24 hr tablet Take 1 tablet (500 mg total) by mouth once daily.    rOPINIRole (REQUIP) 0.5 MG tablet Take 1 tablet (0.5 mg total) by mouth every evening.     No current facility-administered medications for this visit.        Review of patient's allergies indicates:  No Known Allergies    Family History   Problem Relation Age of Onset    Diabetes Mother     Diabetes Father     Cancer Maternal Grandfather     Throat cancer Maternal Grandfather        Social History     Socioeconomic History    Marital status: Single     Spouse name: Not on file    Number of children: Not on file    Years of education: Not on file    Highest education level: Not on file   Social Needs    Financial resource strain: Not on file    Food insecurity - worry: Not on file    Food insecurity - inability: Not on file    Transportation needs - medical:  Not on file    Transportation needs - non-medical: Not on file   Occupational History    Not on file   Tobacco Use    Smoking status: Former Smoker     Years: 45.00     Last attempt to quit: 2017     Years since quittin.0    Smokeless tobacco: Never Used    Tobacco comment: 45 year, about 1PPW, quit 2017   Substance and Sexual Activity    Alcohol use: Yes     Alcohol/week: 0.6 oz     Types: 1 Glasses of wine per week    Drug use: No    Sexual activity: Yes     Partners: Female   Other Topics Concern    Not on file   Social History Narrative    18: he is retired. He used to work as a  for 10 years and also an . The last 10 years he was an  for [employer]. He lives with his wife. He has a poodle at home. He has two kids; they live nearby.        Chief Complaint:   Chief Complaint   Patient presents with    Left Knee - Pain       History of present illness:  This is a 63-year-old male seen in consultation for Dr. Doe.  Patient has left knee pain.  This has been ongoing since October.  There on a vacation and had to go up some pretty steep steps out of bed and breakfast.  Since then he has had a lot of pain around the kneecap.  Pain going up and down steps.  Pain standing sitting or laying.  Pain is laterally located near the patella.  Pain is a 2/10.  No prior treatment.      Review of Systems:    Constitution: Negative for chills, fever, and sweats.  Negative for unexplained weight loss.    HENT:  Negative for headaches and blurry vision.    Cardiovascular:Negative for chest pain or irregular heart beat. Negative for hypertension.    Respiratory:  Negative for cough and shortness of breath.    Gastrointestinal: Negative for abdominal pain, heartburn, melena, nausea, and vomitting.    Genitourinary:  Negative bladder incontinence and dysuria.    Musculoskeletal:  See HPI    Neurological: Negative for numbness.    Psychiatric/Behavioral: Negative for depression.  The  patient is not nervous/anxious.      Endocrine: Negative for polyuria    Hematologic/Lymphatic: Negative for bleeding problem.  Does not bruise/bleed easily.    Skin: Negative for poor would healing and rash      Physical Examination:    Vital Signs:    Vitals:    01/02/19 1357   BP: 123/74   Pulse: 80       Body mass index is 29.86 kg/m².    This a well-developed, well nourished patient in no acute distress.  They are alert and oriented and cooperative to examination.  Pt. walks without an antalgic gait.      Examination of the left knee shows no rashes or erythema. There are no masses ecchymosis or effusion. Patient has full range of motion from 0-130°. Patient is nontender to palpation over lateral joint line and nontender to palpation over the medial joint line. Patient has a - Lachman exam, - anterior drawer exam, and - posterior drawer exam. - Rosa M's exam. Knee is stable to varus and valgus stress. 5 out of 5 motor strength. Palpable distal pulses. Intact light touch sensation. Negative Patellofemoral crepitus.  Mildly tender along the lateral retinaculum of the patella.    Examination of the right knee shows no rashes or erythema. There are no masses ecchymosis or effusion. Patient has full range of motion from 0-130°. Patient is nontender to palpation over lateral joint line and nontender to palpation over the medial joint line. Patient has a - Lachman exam, - anterior drawer exam, and - posterior drawer exam. - Rosa M's exam. Knee is stable to varus and valgus stress. 5 out of 5 motor strength. Palpable distal pulses. Intact light touch sensation. Negative Patellofemoral crepitus        X-rays:  X-rays of the left knee are ordered and reviewed which show well-maintained joint space. Mild thinning of the patellofemoral joints laterally.     Assessment::  Left patellofemoral arthritis    Plan:  Reviewed the findings with him and his wife today.  Recommended a cortisone injection. Instructed him to  monitor his sugars.  Follow-up as needed.    This note was created using [employer] voice recognition software that occasionally misinterpreted phrases or words.    Consult note is delivered via Epic messaging service.

## 2019-01-02 NOTE — LETTER
January 2, 2019      Blanca Doe MD  [id number] [address] [address]           [facility] [address] [facility] [address] [place] [address]  Phone: [phone]          Patient: Trevor Johnston   MR Number: [id number]   YOB: 1955   Date of Visit: 1/2/2019       Dear Dr. Blanca Doe:    Thank you for referring Trevor Johnston to me for evaluation. Attached you will find relevant portions of my assessment and plan of care.    If you have questions, please do not hesitate to call me. I look forward to following Trevor Johnston along with you.    Sincerely,    Andrés Appiah MD    Enclosure  CC:  No Recipients    If you would like to receive this communication electronically, please contact [email or web address] or [phone] to request more information on [facility] access.    For providers and/or their staff who would like to refer a patient to [facility], please contact us through our one-stop-shop provider referral line, Diane Anderson, at [phone].    If you feel you have received this communication in error or would no longer like to receive these types of communications, please e-mail [email or web address]

## 2019-03-07 ENCOUNTER — OFFICE VISIT (OUTPATIENT)
Dept: FAMILY MEDICINE | Facility: CLINIC | Age: 64
End: 2019-03-07
Payer: OTHER GOVERNMENT

## 2019-03-07 ENCOUNTER — TELEPHONE (OUTPATIENT)
Dept: FAMILY MEDICINE | Facility: CLINIC | Age: 64
End: 2019-03-07

## 2019-03-07 VITALS
DIASTOLIC BLOOD PRESSURE: 68 MMHG | SYSTOLIC BLOOD PRESSURE: 104 MMHG | HEART RATE: 85 BPM | TEMPERATURE: 99 F | HEIGHT: 66 IN | WEIGHT: 184.31 LBS | OXYGEN SATURATION: 95 % | BODY MASS INDEX: 29.62 KG/M2

## 2019-03-07 DIAGNOSIS — J06.9 URI WITH COUGH AND CONGESTION: Primary | ICD-10-CM

## 2019-03-07 PROCEDURE — 99213 OFFICE O/P EST LOW 20 MIN: CPT | Mod: PBBFAC,PO | Performed by: FAMILY MEDICINE

## 2019-03-07 PROCEDURE — 99999 PR PBB SHADOW E&M-EST. PATIENT-LVL III: ICD-10-PCS | Mod: PBBFAC,,, | Performed by: FAMILY MEDICINE

## 2019-03-07 PROCEDURE — 99214 OFFICE O/P EST MOD 30 MIN: CPT | Mod: S$PBB,,, | Performed by: FAMILY MEDICINE

## 2019-03-07 PROCEDURE — 99999 PR PBB SHADOW E&M-EST. PATIENT-LVL III: CPT | Mod: PBBFAC,,, | Performed by: FAMILY MEDICINE

## 2019-03-07 PROCEDURE — 99214 PR OFFICE/OUTPT VISIT, EST, LEVL IV, 30-39 MIN: ICD-10-PCS | Mod: S$PBB,,, | Performed by: FAMILY MEDICINE

## 2019-03-07 RX ORDER — BENZONATATE 100 MG/1
100 CAPSULE ORAL 3 TIMES DAILY PRN
Qty: 30 CAPSULE | Refills: 0 | Status: SHIPPED | OUTPATIENT
Start: 2019-03-07 | End: 2019-03-17

## 2019-03-07 RX ORDER — AZELASTINE 1 MG/ML
1 SPRAY, METERED NASAL 2 TIMES DAILY
Qty: 30 ML | Refills: 11 | Status: SHIPPED | OUTPATIENT
Start: 2019-03-07 | End: 2019-03-28

## 2019-03-07 RX ORDER — PROMETHAZINE HYDROCHLORIDE AND DEXTROMETHORPHAN HYDROBROMIDE 6.25; 15 MG/5ML; MG/5ML
5 SYRUP ORAL NIGHTLY PRN
Qty: 180 ML | Refills: 0 | Status: SHIPPED | OUTPATIENT
Start: 2019-03-07 | End: 2019-03-17

## 2019-03-07 RX ORDER — FLUTICASONE PROPIONATE 50 MCG
1 SPRAY, SUSPENSION (ML) NASAL DAILY
Qty: 1 BOTTLE | Refills: 0 | Status: SHIPPED | OUTPATIENT
Start: 2019-03-07 | End: 2020-01-30

## 2019-03-07 NOTE — PATIENT INSTRUCTIONS
Discussed diagnosis and treatment of URI.  Discussed the importance of avoiding unnecessary antibiotic therapy.  Suggested symptomatic OTC remedies.  astelin BID  Flonase BID  Nasal saline spray for congestion qhs  Aggressive fluids  Tessalon perles  Buckwheat honey for possible cough  Follow up if symptoms aren't resolving.  Follow up with PCP as needed    Contact me if symptoms don't resolve in 5 days

## 2019-03-07 NOTE — TELEPHONE ENCOUNTER
----- Message from Nel Dumont sent at 3/7/2019 10:49 AM CST -----  Contact: pt  Calling to be worked in to the schedule for sore throat, coughing, vomiting and please advise [phone]

## 2019-03-07 NOTE — TELEPHONE ENCOUNTER
Pt called, reports he is actually on the [place] for the rest of the week and will call the [facility] for appt. Access. Nothing further needed

## 2019-03-07 NOTE — PROGRESS NOTES
Subjective:       Patient ID: Trevor Johnston is a 63 y.o. male.    Chief Complaint: Cough (x 2 days); Nasal Congestion (x 2 days); and Sore Throat (x2 days)    Trevor is a 63 y.o. male who presents today for a cough and sore throat. This has been ongoing for 3 days. He having slightly more of a productive cough this morning; productive of green sputum. No recent travel. No exposure to smoke recently. He has tried mucinex and that's helping minimally.       Cough   This is a new problem. The current episode started yesterday. The problem has been waxing and waning. The problem occurs every few minutes. The cough is productive of sputum. Associated symptoms include a sore throat. Pertinent negatives include no chest pain, chills, ear congestion, ear pain, fever, headaches, hemoptysis, nasal congestion, postnasal drip, rash, shortness of breath, sweats, weight loss or wheezing. Nothing aggravates the symptoms. Treatments tried: OTC. The treatment provided mild relief.   Sore Throat    This is a new problem. The current episode started in the past 7 days. The problem has been unchanged. There has been no fever. Associated symptoms include coughing. Pertinent negatives include no abdominal pain, congestion, diarrhea, drooling, ear pain, headaches, shortness of breath, swollen glands or trouble swallowing. He has had no exposure to strep or mono.     Review of Systems   Constitutional: Negative for chills, fever and weight loss.   HENT: Positive for sore throat. Negative for congestion, drooling, ear pain, postnasal drip and trouble swallowing.    Respiratory: Positive for cough. Negative for hemoptysis, shortness of breath and wheezing.    Cardiovascular: Negative for chest pain.   Gastrointestinal: Negative for abdominal pain and diarrhea.   Skin: Negative for rash.   Neurological: Negative for headaches.         Objective:     Vitals:    03/07/19 1452   BP: 104/68   Pulse: 85   Temp: 99 °F (37.2 °C)        Physical  Exam   Constitutional: He is oriented to person, place, and time. He appears well-developed and well-nourished.   HENT:   Head: Normocephalic and atraumatic.   TM: appear normal BL  Mild Nasal congestion noted   Mild pharyngeal erythema without exudate noted  No adenopathy  Full ROM of neck   Eyes: Conjunctivae are normal.   Neck: Normal range of motion. Neck supple.   Cardiovascular: Normal rate and regular rhythm.   Pulmonary/Chest: Effort normal and breath sounds normal. No stridor. No respiratory distress. He has no wheezes.   Musculoskeletal: He exhibits no edema.   Neurological: He is alert and oriented to person, place, and time.   Skin: Skin is warm. No rash noted.   Psychiatric: He has a normal mood and affect. His behavior is normal. Judgment and thought content normal.   Nursing note and vitals reviewed.      Assessment:       1. URI with cough and congestion        Plan:       See AVS for full handout details  Supportive care  Follow up if symptoms don't resolve or worsen.     URI with cough and congestion  -     fluticasone (FLONASE) 50 mcg/actuation nasal spray; 1 spray (50 mcg total) by Each Nare route once daily.  Dispense: 1 Bottle; Refill: 0  -     benzonatate (TESSALON) 100 MG capsule; Take 1 capsule (100 mg total) by mouth 3 (three) times daily as needed for Cough.  Dispense: 30 capsule; Refill: 0  -     promethazine-dextromethorphan (PROMETHAZINE-DM) 6.25-15 mg/5 mL Syrp; Take 5 mLs by mouth nightly as needed.  Dispense: 180 mL; Refill: 0  -     azelastine (ASTELIN) 137 mcg (0.1 %) nasal spray; 1 spray (137 mcg total) by Nasal route 2 (two) times daily.  Dispense: 30 mL; Refill: 11        Warning signs discussed, patient to call with any further issues or worsening of symptoms.

## 2019-03-14 ENCOUNTER — PATIENT OUTREACH (OUTPATIENT)
Dept: ADMINISTRATIVE | Facility: HOSPITAL | Age: 64
End: 2019-03-14

## 2019-03-14 NOTE — PROGRESS NOTES
Health Maintenance Due   Topic Date Due    Zoster Vaccine  10/07/2015    Hemoglobin A1c  03/18/2019

## 2019-03-21 ENCOUNTER — LAB VISIT (OUTPATIENT)
Dept: LAB | Facility: HOSPITAL | Age: 64
End: 2019-03-21
Attending: FAMILY MEDICINE
Payer: OTHER GOVERNMENT

## 2019-03-21 DIAGNOSIS — E78.5 HYPERLIPIDEMIA ASSOCIATED WITH TYPE 2 DIABETES MELLITUS: ICD-10-CM

## 2019-03-21 DIAGNOSIS — I15.2 HYPERTENSION ASSOCIATED WITH DIABETES: ICD-10-CM

## 2019-03-21 DIAGNOSIS — E11.69 HYPERLIPIDEMIA ASSOCIATED WITH TYPE 2 DIABETES MELLITUS: ICD-10-CM

## 2019-03-21 DIAGNOSIS — E11.59 HYPERTENSION ASSOCIATED WITH DIABETES: ICD-10-CM

## 2019-03-21 DIAGNOSIS — E11.8 TYPE 2 DIABETES MELLITUS WITH COMPLICATION, WITHOUT LONG-TERM CURRENT USE OF INSULIN: ICD-10-CM

## 2019-03-21 DIAGNOSIS — N20.0 KIDNEY STONE: ICD-10-CM

## 2019-03-21 LAB
ALBUMIN SERPL BCP-MCNC: 4.1 G/DL
ALP SERPL-CCNC: 89 U/L
ALT SERPL W/O P-5'-P-CCNC: 37 U/L
ANION GAP SERPL CALC-SCNC: 11 MMOL/L
AST SERPL-CCNC: 25 U/L
BASOPHILS # BLD AUTO: 0.08 K/UL
BASOPHILS NFR BLD: 0.7 %
BILIRUB SERPL-MCNC: 0.7 MG/DL
BUN SERPL-MCNC: 18 MG/DL
CALCIUM SERPL-MCNC: 10 MG/DL
CHLORIDE SERPL-SCNC: 104 MMOL/L
CHOLEST SERPL-MCNC: 155 MG/DL
CHOLEST/HDLC SERPL: 4.3 {RATIO}
CO2 SERPL-SCNC: 26 MMOL/L
CREAT SERPL-MCNC: 1 MG/DL
DIFFERENTIAL METHOD: ABNORMAL
EOSINOPHIL # BLD AUTO: 0.2 K/UL
EOSINOPHIL NFR BLD: 2.1 %
ERYTHROCYTE [DISTWIDTH] IN BLOOD BY AUTOMATED COUNT: 12.9 %
EST. GFR  (AFRICAN AMERICAN): >60 ML/MIN/1.73 M^2
EST. GFR  (NON AFRICAN AMERICAN): >60 ML/MIN/1.73 M^2
ESTIMATED AVG GLUCOSE: 154 MG/DL
GLUCOSE SERPL-MCNC: 130 MG/DL
HBA1C MFR BLD HPLC: 7 %
HCT VFR BLD AUTO: 41.1 %
HDLC SERPL-MCNC: 36 MG/DL
HDLC SERPL: 23.2 %
HGB BLD-MCNC: 13.6 G/DL
IMM GRANULOCYTES # BLD AUTO: 0.04 K/UL
IMM GRANULOCYTES NFR BLD AUTO: 0.4 %
LDLC SERPL CALC-MCNC: 85.2 MG/DL
LYMPHOCYTES # BLD AUTO: 2.6 K/UL
LYMPHOCYTES NFR BLD: 24.3 %
MCH RBC QN AUTO: 30.3 PG
MCHC RBC AUTO-ENTMCNC: 33.1 G/DL
MCV RBC AUTO: 92 FL
MONOCYTES # BLD AUTO: 0.9 K/UL
MONOCYTES NFR BLD: 8.3 %
NEUTROPHILS # BLD AUTO: 6.9 K/UL
NEUTROPHILS NFR BLD: 64.2 %
NONHDLC SERPL-MCNC: 119 MG/DL
NRBC BLD-RTO: 0 /100 WBC
PLATELET # BLD AUTO: 261 K/UL
PMV BLD AUTO: 10.1 FL
POTASSIUM SERPL-SCNC: 4.2 MMOL/L
PROT SERPL-MCNC: 7.6 G/DL
RBC # BLD AUTO: 4.49 M/UL
SODIUM SERPL-SCNC: 141 MMOL/L
TRIGL SERPL-MCNC: 169 MG/DL
URATE SERPL-MCNC: 5 MG/DL
WBC # BLD AUTO: 10.68 K/UL

## 2019-03-21 PROCEDURE — 83036 HEMOGLOBIN GLYCOSYLATED A1C: CPT

## 2019-03-21 PROCEDURE — 84550 ASSAY OF BLOOD/URIC ACID: CPT

## 2019-03-21 PROCEDURE — 36415 COLL VENOUS BLD VENIPUNCTURE: CPT | Mod: PO

## 2019-03-21 PROCEDURE — 80053 COMPREHEN METABOLIC PANEL: CPT

## 2019-03-21 PROCEDURE — 85025 COMPLETE CBC W/AUTO DIFF WBC: CPT

## 2019-03-21 PROCEDURE — 80061 LIPID PANEL: CPT

## 2019-03-24 ENCOUNTER — PATIENT MESSAGE (OUTPATIENT)
Dept: FAMILY MEDICINE | Facility: CLINIC | Age: 64
End: 2019-03-24

## 2019-03-28 ENCOUNTER — OFFICE VISIT (OUTPATIENT)
Dept: FAMILY MEDICINE | Facility: CLINIC | Age: 64
End: 2019-03-28
Payer: OTHER GOVERNMENT

## 2019-03-28 VITALS
HEIGHT: 65 IN | TEMPERATURE: 98 F | RESPIRATION RATE: 16 BRPM | BODY MASS INDEX: 30.72 KG/M2 | SYSTOLIC BLOOD PRESSURE: 124 MMHG | OXYGEN SATURATION: 96 % | HEART RATE: 81 BPM | WEIGHT: 184.38 LBS | DIASTOLIC BLOOD PRESSURE: 86 MMHG

## 2019-03-28 DIAGNOSIS — E78.5 HYPERLIPIDEMIA ASSOCIATED WITH TYPE 2 DIABETES MELLITUS: ICD-10-CM

## 2019-03-28 DIAGNOSIS — I15.2 HYPERTENSION ASSOCIATED WITH DIABETES: ICD-10-CM

## 2019-03-28 DIAGNOSIS — E11.29 TYPE 2 DIABETES MELLITUS WITH MICROALBUMINURIA, WITHOUT LONG-TERM CURRENT USE OF INSULIN: Primary | ICD-10-CM

## 2019-03-28 DIAGNOSIS — E11.59 HYPERTENSION ASSOCIATED WITH DIABETES: ICD-10-CM

## 2019-03-28 DIAGNOSIS — E11.69 HYPERLIPIDEMIA ASSOCIATED WITH TYPE 2 DIABETES MELLITUS: ICD-10-CM

## 2019-03-28 DIAGNOSIS — R80.9 TYPE 2 DIABETES MELLITUS WITH MICROALBUMINURIA, WITHOUT LONG-TERM CURRENT USE OF INSULIN: Primary | ICD-10-CM

## 2019-03-28 PROCEDURE — 99214 OFFICE O/P EST MOD 30 MIN: CPT | Mod: S$GLB,,, | Performed by: FAMILY MEDICINE

## 2019-03-28 PROCEDURE — 99214 PR OFFICE/OUTPT VISIT, EST, LEVL IV, 30-39 MIN: ICD-10-PCS | Mod: S$GLB,,, | Performed by: FAMILY MEDICINE

## 2019-03-28 RX ORDER — METFORMIN HYDROCHLORIDE 500 MG/1
1000 TABLET, EXTENDED RELEASE ORAL DAILY
Qty: 180 TABLET | Refills: 1 | Status: SHIPPED | OUTPATIENT
Start: 2019-03-28 | End: 2019-07-05 | Stop reason: SDUPTHER

## 2019-03-30 NOTE — PROGRESS NOTES
Subjective:       Patient ID: Trevor Johnston is a 63 y.o. male.    Chief Complaint: Follow-up (test results)    HPI   The patient is a 63-year-old who is here today for follow-up.  Today is our 2nd visit together.  Overall, he is doing well.  He has no acute questions or concerns regarding his health.  Today we discussed the followin) diabetes.  His recent A1c was 7.0 up from 6.4.  He is currently taking Glucophage  mg once a day which he tolerates well.  He does not check his sugar out of the office.  He really has not been watching his diet or doing his usual level of physical activity with the colder months we have had over the winter  2) hyperlipidemia.  We did review his recent cholesterol readings.  His recent total cholesterol was 155 (up from 119), his triglyceride level was 169 (up from 81) and his LDL was 85 (up from 65).  He is continuing with his Lipitor which he is tolerating well  3) hypertension.  He is doing well with his Avapro.  His blood pressure today is 124/86.  He has not felt as if his blood pressure has been high or low     Review of Systems   Constitutional: Negative for appetite change, chills, diaphoresis, fatigue, fever and unexpected weight change.   HENT: Negative for congestion, ear pain, postnasal drip, rhinorrhea, sinus pressure, sneezing, sore throat and trouble swallowing.    Eyes: Negative for pain, discharge and visual disturbance.   Respiratory: Negative for cough, chest tightness, shortness of breath and wheezing.    Cardiovascular: Negative for chest pain, palpitations and leg swelling.   Gastrointestinal: Negative for abdominal distention, abdominal pain, blood in stool, constipation, diarrhea, nausea and vomiting.   Skin: Negative for rash.       Objective:      Physical Exam   Constitutional: He is oriented to person, place, and time. He appears well-developed and well-nourished. No distress.   HENT:   Head: Normocephalic and atraumatic.   Right Ear: Hearing,  "tympanic membrane, external ear and ear canal normal.   Left Ear: Hearing, tympanic membrane, external ear and ear canal normal.   Nose: Nose normal.   Mouth/Throat: Oropharynx is clear and moist and mucous membranes are normal. No oral lesions. No oropharyngeal exudate, posterior oropharyngeal edema or posterior oropharyngeal erythema.   Eyes: Pupils are equal, round, and reactive to light. Conjunctivae, EOM and lids are normal. No scleral icterus.   Neck: Normal range of motion. Neck supple. Carotid bruit is not present. No thyroid mass and no thyromegaly present.   Cardiovascular: Normal rate, regular rhythm and normal heart sounds.  No extrasystoles are present. PMI is not displaced. Exam reveals no gallop.   No murmur heard.  Pulmonary/Chest: Effort normal and breath sounds normal. No accessory muscle usage. No respiratory distress.   Clear to auscultation bilaterally.   Abdominal: Soft. Normal appearance and bowel sounds are normal. He exhibits no abdominal bruit. There is no hepatosplenomegaly. There is no tenderness. There is no rebound.   Lymphadenopathy:        Head (right side): No submental and no submandibular adenopathy present.        Head (left side): No submental and no submandibular adenopathy present.        Right cervical: No superficial cervical, no deep cervical and no posterior cervical adenopathy present.       Left cervical: No superficial cervical, no deep cervical and no posterior cervical adenopathy present.        Right: No supraclavicular adenopathy present.        Left: No supraclavicular adenopathy present.   Neurological: He is alert and oriented to person, place, and time.   Skin: Skin is warm, dry and intact.   Psychiatric: He has a normal mood and affect.     Blood pressure 124/86, pulse 81, temperature 97.7 °F (36.5 °C), temperature source Oral, resp. rate 16, height 5' 5" (1.651 m), weight 83.6 kg (184 lb 6.4 oz), SpO2 96 %.Body mass index is 30.69 kg/m².          A/P:  1) " diabetes.  Fairly well controlled but with recent increasing numbers.  We are going to increase the Glucophage XR to 1000 mg once a day.  We will recheck an A1c in 3 months.  He will try to be more physically active and watch his diet a little bit closer  2) hyperlipidemia.    Fairly well controlled with recent increase in numbers.  For now we will continue with his current dose of Lipitor.  He will increase his activity level and watch his diet closer  3)   Hypertension.  Well controlled.  Continue with Avapro   4)  History of uric acid stones.  Asymptomatic.  Recent uric acid levels are normal.  Continue with allopurinol    We will check an A1c in 3 months.  If this is normal, we will check more extensive labs in 6 months with a follow-up visit after those 6 months labs.  He will contact me sooner if needed

## 2019-04-08 DIAGNOSIS — E78.5 HYPERLIPIDEMIA, UNSPECIFIED HYPERLIPIDEMIA TYPE: ICD-10-CM

## 2019-04-08 DIAGNOSIS — I15.2 HYPERTENSION ASSOCIATED WITH DIABETES: ICD-10-CM

## 2019-04-08 DIAGNOSIS — N20.0 KIDNEY STONES: ICD-10-CM

## 2019-04-08 DIAGNOSIS — E11.59 HYPERTENSION ASSOCIATED WITH DIABETES: ICD-10-CM

## 2019-04-08 RX ORDER — IRBESARTAN 150 MG/1
TABLET, FILM COATED ORAL
Qty: 90 TABLET | Refills: 1 | Status: SHIPPED | OUTPATIENT
Start: 2019-04-08 | End: 2019-10-07 | Stop reason: SDUPTHER

## 2019-04-08 RX ORDER — ATORVASTATIN CALCIUM 40 MG/1
TABLET, FILM COATED ORAL
Qty: 90 TABLET | Refills: 1 | Status: SHIPPED | OUTPATIENT
Start: 2019-04-08 | End: 2019-10-07 | Stop reason: SDUPTHER

## 2019-04-08 RX ORDER — ALLOPURINOL 300 MG/1
TABLET ORAL
Qty: 90 TABLET | Refills: 1 | Status: SHIPPED | OUTPATIENT
Start: 2019-04-08 | End: 2019-10-14 | Stop reason: SDUPTHER

## 2019-06-28 ENCOUNTER — LAB VISIT (OUTPATIENT)
Dept: LAB | Facility: HOSPITAL | Age: 64
End: 2019-06-28
Attending: FAMILY MEDICINE
Payer: OTHER GOVERNMENT

## 2019-06-28 DIAGNOSIS — R80.9 TYPE 2 DIABETES MELLITUS WITH MICROALBUMINURIA, WITHOUT LONG-TERM CURRENT USE OF INSULIN: ICD-10-CM

## 2019-06-28 DIAGNOSIS — E11.29 TYPE 2 DIABETES MELLITUS WITH MICROALBUMINURIA, WITHOUT LONG-TERM CURRENT USE OF INSULIN: ICD-10-CM

## 2019-06-28 LAB
ESTIMATED AVG GLUCOSE: 163 MG/DL (ref 68–131)
HBA1C MFR BLD HPLC: 7.3 % (ref 4–5.6)

## 2019-06-28 PROCEDURE — 83036 HEMOGLOBIN GLYCOSYLATED A1C: CPT

## 2019-06-28 PROCEDURE — 36415 COLL VENOUS BLD VENIPUNCTURE: CPT | Mod: PO

## 2019-07-01 ENCOUNTER — PATIENT MESSAGE (OUTPATIENT)
Dept: FAMILY MEDICINE | Facility: CLINIC | Age: 64
End: 2019-07-01

## 2019-07-01 DIAGNOSIS — E11.29 TYPE 2 DIABETES MELLITUS WITH MICROALBUMINURIA, WITHOUT LONG-TERM CURRENT USE OF INSULIN: ICD-10-CM

## 2019-07-01 DIAGNOSIS — R80.9 TYPE 2 DIABETES MELLITUS WITH MICROALBUMINURIA, WITHOUT LONG-TERM CURRENT USE OF INSULIN: ICD-10-CM

## 2019-07-05 RX ORDER — METFORMIN HYDROCHLORIDE 500 MG/1
1000 TABLET, EXTENDED RELEASE ORAL 2 TIMES DAILY WITH MEALS
Qty: 180 TABLET | Refills: 1 | Status: SHIPPED | OUTPATIENT
Start: 2019-07-05 | End: 2019-12-23 | Stop reason: SDUPTHER

## 2019-07-05 NOTE — TELEPHONE ENCOUNTER
Patient returned call to clinic, states he is tolerated metformin well and is currently taking 1,000mg daily. Please advise.

## 2019-07-05 NOTE — TELEPHONE ENCOUNTER
"Notified patient of provider message regarding prescription change and return call to clinic with readings in 3 weeks, patient states " I haven't been doing that, I'll have to see if I still have all the stuff to check it."  "

## 2019-07-15 RX ORDER — ROPINIROLE 0.5 MG/1
TABLET, FILM COATED ORAL
Qty: 90 TABLET | Refills: 1 | Status: SHIPPED | OUTPATIENT
Start: 2019-07-15 | End: 2019-12-19

## 2019-08-09 ENCOUNTER — OFFICE VISIT (OUTPATIENT)
Dept: FAMILY MEDICINE | Facility: CLINIC | Age: 64
End: 2019-08-09
Payer: OTHER GOVERNMENT

## 2019-08-09 VITALS
RESPIRATION RATE: 18 BRPM | HEART RATE: 95 BPM | SYSTOLIC BLOOD PRESSURE: 122 MMHG | HEIGHT: 65 IN | TEMPERATURE: 98 F | DIASTOLIC BLOOD PRESSURE: 68 MMHG | BODY MASS INDEX: 30.12 KG/M2 | WEIGHT: 180.81 LBS | OXYGEN SATURATION: 97 %

## 2019-08-09 DIAGNOSIS — M54.9 CHRONIC UPPER BACK PAIN: Primary | ICD-10-CM

## 2019-08-09 DIAGNOSIS — G89.29 CHRONIC UPPER BACK PAIN: Primary | ICD-10-CM

## 2019-08-09 PROCEDURE — 99214 OFFICE O/P EST MOD 30 MIN: CPT | Mod: S$GLB,,, | Performed by: INTERNAL MEDICINE

## 2019-08-09 PROCEDURE — 99214 PR OFFICE/OUTPT VISIT, EST, LEVL IV, 30-39 MIN: ICD-10-PCS | Mod: S$GLB,,, | Performed by: INTERNAL MEDICINE

## 2019-08-09 RX ORDER — NAPROXEN 500 MG/1
500 TABLET ORAL 2 TIMES DAILY PRN
Qty: 60 TABLET | Refills: 0 | Status: SHIPPED | OUTPATIENT
Start: 2019-08-09 | End: 2019-10-02

## 2019-08-09 RX ORDER — TIZANIDINE 4 MG/1
4 TABLET ORAL NIGHTLY PRN
Qty: 30 TABLET | Refills: 0 | Status: SHIPPED | OUTPATIENT
Start: 2019-08-09 | End: 2019-08-19

## 2019-08-09 NOTE — PROGRESS NOTES
Subjective:       Patient ID: Trevor Johnston is a 63 y.o. male.    Medication List with Changes/Refills   New Medications    NAPROXEN (EC NAPROSYN) 500 MG EC TABLET    Take 1 tablet (500 mg total) by mouth 2 (two) times daily as needed.    TIZANIDINE (ZANAFLEX) 4 MG TABLET    Take 1 tablet (4 mg total) by mouth nightly as needed.   Current Medications    ALLOPURINOL (ZYLOPRIM) 300 MG TABLET    TAKE 1 TABLET DAILY    ATORVASTATIN (LIPITOR) 40 MG TABLET    TAKE 1 TABLET DAILY    AVAPRO 150 MG TABLET    TAKE 1 TABLET DAILY    BLOOD SUGAR DIAGNOSTIC (FREESTYLE LITE STRIPS) STRP        BLOOD-GLUCOSE METER KIT    To check BG one times daily, to use with insurance preferred meter    FLUTICASONE (FLONASE) 50 MCG/ACTUATION NASAL SPRAY    1 spray (50 mcg total) by Each Nare route once daily.    LANCETS MISC    To check BG 1 times daily, to use with insurance preferred meter    METFORMIN (GLUCOPHAGE-XR) 500 MG 24 HR TABLET    Take 2 tablets (1,000 mg total) by mouth 2 (two) times daily with meals.    ROPINIROLE (REQUIP) 0.5 MG TABLET    TAKE 1 TABLET EVERY EVENING   Discontinued Medications    BLOOD SUGAR DIAGNOSTIC STRP    To check BG one times daily, to use with insurance preferred meter       Chief Complaint: Back Pain (Upper back pain for at least 6 months)  He presents with 4 months of intermittent upper back pain.  He says pain is worse at night when he is laying in bed and then turns over.  Worse when he bumps his shoulder which jars his back.  Pain does not radiate and no weakness.  Better with ibuprofen. He does report to doing a lot of heavy lifting and physical exertion over the last 6 months (moving, ect..) and lifting his grandson who is 19 lbs daily.  He has no history of back pain.  Pain is not progressing but it has not improved. It is intermittent. He denies pain with bending or movement of his arms or neck.      Review of Systems   Constitutional: Negative for activity change, appetite change, chills,  "fatigue and fever.   HENT: Negative for congestion, ear discharge, ear pain, mouth sores, postnasal drip, rhinorrhea, sinus pressure and sore throat.    Eyes: Negative for pain, discharge and redness.   Respiratory: Negative for cough, chest tightness, shortness of breath and wheezing.    Gastrointestinal: Negative for abdominal pain, constipation, diarrhea, nausea and vomiting.   Genitourinary: Negative for dysuria.   Musculoskeletal: Positive for back pain. Negative for arthralgias and neck stiffness.   Skin: Negative for rash.   Neurological: Negative for headaches.   Hematological: Negative for adenopathy.       Objective:      Vitals:    08/09/19 1221   BP: 122/68   Pulse: 95   Resp: 18   Temp: 97.9 °F (36.6 °C)   TempSrc: Oral   SpO2: 97%   Weight: 82 kg (180 lb 12.8 oz)   Height: 5' 5" (1.651 m)     Body mass index is 30.09 kg/m².  Physical Exam    General appearance: No acute distress, cooperative  Neck: FROM, soft, supple,   Lymph: no anterior or posterior cervical adenopathy  Heart::  Regular rate and rhythm, no murmur  Lung: Clear to ascultation bilaterally, no wheezing, no rales, no rhonchi, no distress  Abdomen: Soft, nontender, no distention, no hepatosplenomegaly, bowel sounds normal, no guarding, no rebound, no peritoneal signs  Skin: no rashes, no lesions  Extremities: no edema, no cyanosis  Neuro: no focal abnormalities, strength 5/5 b/l UE and LE, 2+ DTRs b/l UE and LE, normal gait  Peripheral pulses: 2+ pedal pulses bilaterally, good perfusion and color  Musculoskeletal/back: FROM, good strenth, no tenderness  Joint: normal appearance, no swelling, no warmth, no deformity in all joints    Assessment:       1. Chronic upper back pain        Plan:       Chronic upper back pain  Intermittent upper back pain that is muscular.  Not able to illicit pain on exam and no red flags.  Pain worse at night. Given muscle relaxer and NSAIDs to use PRN. Recommended PT but he wants to wait at this time but will " consider if worsens.  Advised if worsening then he should follow up.   -     tiZANidine (ZANAFLEX) 4 MG tablet; Take 1 tablet (4 mg total) by mouth nightly as needed.  Dispense: 30 tablet; Refill: 0  -     naproxen (EC NAPROSYN) 500 MG EC tablet; Take 1 tablet (500 mg total) by mouth 2 (two) times daily as needed.  Dispense: 60 tablet; Refill: 0    Follow up if symptoms worsen or fail to improve.

## 2019-08-26 DIAGNOSIS — R80.9 TYPE 2 DIABETES MELLITUS WITH MICROALBUMINURIA, WITHOUT LONG-TERM CURRENT USE OF INSULIN: ICD-10-CM

## 2019-08-26 DIAGNOSIS — E11.29 TYPE 2 DIABETES MELLITUS WITH MICROALBUMINURIA, WITHOUT LONG-TERM CURRENT USE OF INSULIN: ICD-10-CM

## 2019-08-26 RX ORDER — LANCETS 28 GAUGE
EACH MISCELLANEOUS
Qty: 100 EACH | Refills: 3 | Status: SHIPPED | OUTPATIENT
Start: 2019-08-26 | End: 2023-10-23

## 2019-09-18 ENCOUNTER — PATIENT OUTREACH (OUTPATIENT)
Dept: ADMINISTRATIVE | Facility: HOSPITAL | Age: 64
End: 2019-09-18

## 2019-09-27 ENCOUNTER — PATIENT MESSAGE (OUTPATIENT)
Dept: FAMILY MEDICINE | Facility: CLINIC | Age: 64
End: 2019-09-27

## 2019-09-27 ENCOUNTER — LAB VISIT (OUTPATIENT)
Dept: LAB | Facility: HOSPITAL | Age: 64
End: 2019-09-27
Attending: FAMILY MEDICINE
Payer: OTHER GOVERNMENT

## 2019-09-27 DIAGNOSIS — E78.5 HYPERLIPIDEMIA ASSOCIATED WITH TYPE 2 DIABETES MELLITUS: ICD-10-CM

## 2019-09-27 DIAGNOSIS — E11.69 HYPERLIPIDEMIA ASSOCIATED WITH TYPE 2 DIABETES MELLITUS: ICD-10-CM

## 2019-09-27 DIAGNOSIS — E11.59 HYPERTENSION ASSOCIATED WITH DIABETES: ICD-10-CM

## 2019-09-27 DIAGNOSIS — E11.29 TYPE 2 DIABETES MELLITUS WITH MICROALBUMINURIA, WITHOUT LONG-TERM CURRENT USE OF INSULIN: ICD-10-CM

## 2019-09-27 DIAGNOSIS — R80.9 TYPE 2 DIABETES MELLITUS WITH MICROALBUMINURIA, WITHOUT LONG-TERM CURRENT USE OF INSULIN: ICD-10-CM

## 2019-09-27 DIAGNOSIS — I15.2 HYPERTENSION ASSOCIATED WITH DIABETES: ICD-10-CM

## 2019-09-27 LAB
ALBUMIN SERPL BCP-MCNC: 4 G/DL (ref 3.5–5.2)
ALP SERPL-CCNC: 84 U/L (ref 55–135)
ALT SERPL W/O P-5'-P-CCNC: 41 U/L (ref 10–44)
ANION GAP SERPL CALC-SCNC: 10 MMOL/L (ref 8–16)
AST SERPL-CCNC: 25 U/L (ref 10–40)
BASOPHILS # BLD AUTO: 0.09 K/UL (ref 0–0.2)
BASOPHILS NFR BLD: 0.9 % (ref 0–1.9)
BILIRUB SERPL-MCNC: 0.7 MG/DL (ref 0.1–1)
BUN SERPL-MCNC: 16 MG/DL (ref 8–23)
CALCIUM SERPL-MCNC: 9.6 MG/DL (ref 8.7–10.5)
CHLORIDE SERPL-SCNC: 103 MMOL/L (ref 95–110)
CHOLEST SERPL-MCNC: 135 MG/DL (ref 120–199)
CHOLEST/HDLC SERPL: 3.4 {RATIO} (ref 2–5)
CO2 SERPL-SCNC: 26 MMOL/L (ref 23–29)
CREAT SERPL-MCNC: 1 MG/DL (ref 0.5–1.4)
DIFFERENTIAL METHOD: ABNORMAL
EOSINOPHIL # BLD AUTO: 0.3 K/UL (ref 0–0.5)
EOSINOPHIL NFR BLD: 2.6 % (ref 0–8)
ERYTHROCYTE [DISTWIDTH] IN BLOOD BY AUTOMATED COUNT: 12.9 % (ref 11.5–14.5)
EST. GFR  (AFRICAN AMERICAN): >60 ML/MIN/1.73 M^2
EST. GFR  (NON AFRICAN AMERICAN): >60 ML/MIN/1.73 M^2
ESTIMATED AVG GLUCOSE: 131 MG/DL (ref 68–131)
GLUCOSE SERPL-MCNC: 103 MG/DL (ref 70–110)
HBA1C MFR BLD HPLC: 6.2 % (ref 4–5.6)
HCT VFR BLD AUTO: 42.1 % (ref 40–54)
HDLC SERPL-MCNC: 40 MG/DL (ref 40–75)
HDLC SERPL: 29.6 % (ref 20–50)
HGB BLD-MCNC: 13.2 G/DL (ref 14–18)
IMM GRANULOCYTES # BLD AUTO: 0.03 K/UL (ref 0–0.04)
IMM GRANULOCYTES NFR BLD AUTO: 0.3 % (ref 0–0.5)
LDLC SERPL CALC-MCNC: 69 MG/DL (ref 63–159)
LYMPHOCYTES # BLD AUTO: 2.9 K/UL (ref 1–4.8)
LYMPHOCYTES NFR BLD: 29.6 % (ref 18–48)
MCH RBC QN AUTO: 30.3 PG (ref 27–31)
MCHC RBC AUTO-ENTMCNC: 31.4 G/DL (ref 32–36)
MCV RBC AUTO: 97 FL (ref 82–98)
MONOCYTES # BLD AUTO: 0.9 K/UL (ref 0.3–1)
MONOCYTES NFR BLD: 8.9 % (ref 4–15)
NEUTROPHILS # BLD AUTO: 5.7 K/UL (ref 1.8–7.7)
NEUTROPHILS NFR BLD: 57.7 % (ref 38–73)
NONHDLC SERPL-MCNC: 95 MG/DL
NRBC BLD-RTO: 0 /100 WBC
PLATELET # BLD AUTO: 236 K/UL (ref 150–350)
PMV BLD AUTO: 9.7 FL (ref 9.2–12.9)
POTASSIUM SERPL-SCNC: 3.8 MMOL/L (ref 3.5–5.1)
PROT SERPL-MCNC: 7.2 G/DL (ref 6–8.4)
RBC # BLD AUTO: 4.36 M/UL (ref 4.6–6.2)
SODIUM SERPL-SCNC: 139 MMOL/L (ref 136–145)
TRIGL SERPL-MCNC: 130 MG/DL (ref 30–150)
URATE SERPL-MCNC: 5.3 MG/DL (ref 3.4–7)
WBC # BLD AUTO: 9.89 K/UL (ref 3.9–12.7)

## 2019-09-27 PROCEDURE — 85025 COMPLETE CBC W/AUTO DIFF WBC: CPT

## 2019-09-27 PROCEDURE — 80061 LIPID PANEL: CPT

## 2019-09-27 PROCEDURE — 84550 ASSAY OF BLOOD/URIC ACID: CPT

## 2019-09-27 PROCEDURE — 80053 COMPREHEN METABOLIC PANEL: CPT

## 2019-09-27 PROCEDURE — 36415 COLL VENOUS BLD VENIPUNCTURE: CPT | Mod: PO

## 2019-09-27 PROCEDURE — 83036 HEMOGLOBIN GLYCOSYLATED A1C: CPT

## 2019-10-02 ENCOUNTER — OFFICE VISIT (OUTPATIENT)
Dept: FAMILY MEDICINE | Facility: CLINIC | Age: 64
End: 2019-10-02
Payer: OTHER GOVERNMENT

## 2019-10-02 VITALS
BODY MASS INDEX: 29.99 KG/M2 | SYSTOLIC BLOOD PRESSURE: 124 MMHG | TEMPERATURE: 98 F | DIASTOLIC BLOOD PRESSURE: 82 MMHG | HEIGHT: 65 IN | WEIGHT: 180 LBS | RESPIRATION RATE: 16 BRPM | HEART RATE: 76 BPM | OXYGEN SATURATION: 98 %

## 2019-10-02 DIAGNOSIS — E78.5 HYPERLIPIDEMIA ASSOCIATED WITH TYPE 2 DIABETES MELLITUS: ICD-10-CM

## 2019-10-02 DIAGNOSIS — Z12.5 PROSTATE CANCER SCREENING: ICD-10-CM

## 2019-10-02 DIAGNOSIS — E11.69 HYPERLIPIDEMIA ASSOCIATED WITH TYPE 2 DIABETES MELLITUS: ICD-10-CM

## 2019-10-02 DIAGNOSIS — E11.8 TYPE 2 DIABETES MELLITUS WITH COMPLICATION, UNSPECIFIED WHETHER LONG TERM INSULIN USE: Primary | ICD-10-CM

## 2019-10-02 DIAGNOSIS — E11.59 HYPERTENSION ASSOCIATED WITH DIABETES: ICD-10-CM

## 2019-10-02 DIAGNOSIS — I15.2 HYPERTENSION ASSOCIATED WITH DIABETES: ICD-10-CM

## 2019-10-02 PROCEDURE — 99214 PR OFFICE/OUTPT VISIT, EST, LEVL IV, 30-39 MIN: ICD-10-PCS | Mod: 25,S$GLB,, | Performed by: FAMILY MEDICINE

## 2019-10-02 PROCEDURE — 90686 FLU VACCINE (QUAD) GREATER THAN OR EQUAL TO 3YO PRESERVATIVE FREE IM: ICD-10-PCS | Mod: S$GLB,,, | Performed by: FAMILY MEDICINE

## 2019-10-02 PROCEDURE — 90471 FLU VACCINE (QUAD) GREATER THAN OR EQUAL TO 3YO PRESERVATIVE FREE IM: ICD-10-PCS | Mod: S$GLB,,, | Performed by: FAMILY MEDICINE

## 2019-10-02 PROCEDURE — 99214 OFFICE O/P EST MOD 30 MIN: CPT | Mod: 25,S$GLB,, | Performed by: FAMILY MEDICINE

## 2019-10-02 PROCEDURE — 90686 IIV4 VACC NO PRSV 0.5 ML IM: CPT | Mod: S$GLB,,, | Performed by: FAMILY MEDICINE

## 2019-10-02 PROCEDURE — 90471 IMMUNIZATION ADMIN: CPT | Mod: S$GLB,,, | Performed by: FAMILY MEDICINE

## 2019-10-07 DIAGNOSIS — I15.2 HYPERTENSION ASSOCIATED WITH DIABETES: ICD-10-CM

## 2019-10-07 DIAGNOSIS — E78.5 HYPERLIPIDEMIA, UNSPECIFIED HYPERLIPIDEMIA TYPE: ICD-10-CM

## 2019-10-07 DIAGNOSIS — E11.59 HYPERTENSION ASSOCIATED WITH DIABETES: ICD-10-CM

## 2019-10-07 NOTE — PROGRESS NOTES
Subjective:       Patient ID: Trevor Johnston is a 64 y.o. male.    Chief Complaint: Follow-up    HPI   The patient is a 64-year-old who is here today for his 6 month follow-up.  Today we discussed the followin)  Diabetes.  In August and September, his fasting sugars were in the 110-120 range.  He has not had any hypoglycemic sugar readings.  However, over the past 2 or 3 weeks, he has felt weak throughout the day but has not checked his sugars when he has felt weak.  In addition to feeling weak throughout the day, he has had some diarrhea with the metformin.  Last week, he stop the Glucophage for few days and felt better without it.   His most recent dose of Glucophage has been 1000 mg twice a day.  He notes that he has recently changed his diet and is following his daughter's (who is a nutritionist) advice to eat smaller more frequent meals throughout the day.  We did review his recent A1cs.  His most recent A1c was 6.2 down from a prior value of 7.3 in 2019 and 7.0 in 2019  2) hypertension.  His blood pressure today is 124/82.  He is doing well with the Avapro which he is tolerating well  3) hyperlipidemia.  He is doing well with the Lipitor.  His most recent LDL was 69    4)  hyperuricemia with history of kidney stones.  He is doing well with the allopurinol.  His recent uric acid level was 5.3      Review of Systems   Constitutional: Negative for appetite change, chills, diaphoresis, fatigue, fever and unexpected weight change.   HENT: Negative for congestion, ear pain, postnasal drip, rhinorrhea, sinus pressure, sneezing, sore throat and trouble swallowing.    Eyes: Negative for pain, discharge and visual disturbance.   Respiratory: Negative for cough, chest tightness, shortness of breath and wheezing.    Cardiovascular: Negative for chest pain, palpitations and leg swelling.   Gastrointestinal: Negative for abdominal distention, abdominal pain, blood in stool, constipation, diarrhea,  nausea and vomiting.   Skin: Negative for rash.       Objective:      Physical Exam   Constitutional: He is oriented to person, place, and time. He appears well-developed and well-nourished. No distress.   HENT:   Head: Normocephalic and atraumatic.   Right Ear: Hearing, tympanic membrane, external ear and ear canal normal.   Left Ear: Hearing, tympanic membrane, external ear and ear canal normal.   Nose: Nose normal.   Mouth/Throat: Oropharynx is clear and moist and mucous membranes are normal. No oral lesions. No oropharyngeal exudate, posterior oropharyngeal edema or posterior oropharyngeal erythema.   Eyes: Pupils are equal, round, and reactive to light. Conjunctivae, EOM and lids are normal. No scleral icterus.   Neck: Normal range of motion. Neck supple. Carotid bruit is not present. No thyroid mass and no thyromegaly present.   Cardiovascular: Normal rate, regular rhythm and normal heart sounds.  No extrasystoles are present. PMI is not displaced. Exam reveals no gallop.   No murmur heard.  Pulses:       Dorsalis pedis pulses are 2+ on the right side, and 2+ on the left side.        Posterior tibial pulses are 2+ on the right side, and 2+ on the left side.   Pulmonary/Chest: Effort normal and breath sounds normal. No accessory muscle usage. No respiratory distress.   Clear to auscultation bilaterally.   Abdominal: Soft. Normal appearance and bowel sounds are normal. He exhibits no abdominal bruit. There is no hepatosplenomegaly. There is no tenderness. There is no rebound.   Musculoskeletal:        Right foot: There is no deformity.        Left foot: There is no deformity.   Feet:   Right Foot:   Protective Sensation: 10 sites tested. 10 sites sensed.   Skin Integrity: Positive for warmth. Negative for ulcer or callus.   Left Foot:   Protective Sensation: 10 sites tested. 10 sites sensed.   Skin Integrity: Positive for warmth. Negative for ulcer or callus.   Lymphadenopathy:        Head (right side): No  "submental and no submandibular adenopathy present.        Head (left side): No submental and no submandibular adenopathy present.        Right cervical: No superficial cervical, no deep cervical and no posterior cervical adenopathy present.       Left cervical: No superficial cervical, no deep cervical and no posterior cervical adenopathy present.        Right: No supraclavicular adenopathy present.        Left: No supraclavicular adenopathy present.   Neurological: He is alert and oriented to person, place, and time.   Skin: Skin is warm, dry and intact.   Psychiatric: He has a normal mood and affect.     Blood pressure 124/82, pulse 76, temperature 98 °F (36.7 °C), temperature source Oral, resp. rate 16, height 5' 5" (1.651 m), weight 81.6 kg (180 lb), SpO2 98 %.Body mass index is 29.95 kg/m².          A/P:  1) diabetes.  Well controlled.  We are going to continue with the Glucophage  but change dose to 500 mg in the morning and 1000 mg in the evening.  If he has diarrhea with this dose of Glucophage or if he feels weak or has hypoglycemic episodes with this dose of Glucophage, he will let me know  2) hypertension.  Well controlled.  Continue with Avapro  3) hyperlipidemia.  Well controlled.  Continue with Lipitor .  We will recheck this in 1 year  4) Hyperuricemia with history of kidney stones.  Well controlled.  Continue with allopurinol      5) health maintenance issues.  He will receive his flu shot today.  He will get his shingles shot at the pharmacy.  He would like to  do a PSA with his next set of labs    As long as he does well, I will see him back in 6 months with labs prior to that visit or sooner if needed  "

## 2019-10-08 RX ORDER — ATORVASTATIN CALCIUM 40 MG/1
TABLET, FILM COATED ORAL
Qty: 90 TABLET | Refills: 3 | Status: SHIPPED | OUTPATIENT
Start: 2019-10-08 | End: 2020-10-02

## 2019-10-08 RX ORDER — IRBESARTAN 150 MG/1
TABLET, FILM COATED ORAL
Qty: 90 TABLET | Refills: 3 | Status: SHIPPED | OUTPATIENT
Start: 2019-10-08 | End: 2020-10-02

## 2019-10-09 ENCOUNTER — TELEPHONE (OUTPATIENT)
Dept: FAMILY MEDICINE | Facility: CLINIC | Age: 64
End: 2019-10-09

## 2019-10-09 DIAGNOSIS — H40.9 GLAUCOMA, UNSPECIFIED GLAUCOMA TYPE, UNSPECIFIED LATERALITY: Primary | ICD-10-CM

## 2019-10-09 NOTE — TELEPHONE ENCOUNTER
----- Message from Tegan Johnson sent at 10/9/2019 10:01 AM CDT -----  Contact: patient  Type:  Patient Requesting Referral    Who Called:  Patient  Does the patient already have the specialty appointment scheduled?:  no  If yes, what is the date of that appointment?:    Referral to What Specialty:  Ophthalmologist, glaucoma specialist  Reason for Referral:  glaucoma  Does the patient want the referral with a specific physician?:  Dr. Victorino Willis at [facility]   Is the specialist an [facility]?:  non  Patient Requesting a Call Back?:  Yes, please advise  Best Call Back Number:  [phone]  Additional Information:     [facility]- [phone]     Dr. Dotson (referring him to Dr. Willis but said PCP needs to refer for tri care)- [phone]- ask for feliciano

## 2019-10-09 NOTE — TELEPHONE ENCOUNTER
Patient notified referral has been addressed and faxed (via Epic, 'completed' status) to Dr. Willis's office. Verbalized understanding and requested a copy be made available for  in office. Message sent to in house staff to have ready.

## 2019-10-10 ENCOUNTER — TELEPHONE (OUTPATIENT)
Dept: FAMILY MEDICINE | Facility: CLINIC | Age: 64
End: 2019-10-10

## 2019-10-10 NOTE — TELEPHONE ENCOUNTER
----- Message from Kendal Weinberg sent at 10/10/2019  9:15 AM CDT -----  Contact: Patient  Type: Needs Medical Advice    Who Called:  Patient  Best Call Back Number: [phone] (home)   Additional Information: Patient stated that the referral for a glaucoma Dr was faxed to the wrong place please refax to [phone] that is Dr Victorino Lyn this office accepts his . Please call if any questions.

## 2019-10-10 NOTE — TELEPHONE ENCOUNTER
----- Message from Leigh Joyce sent at 10/10/2019 11:47 AM CDT -----  Type: Needs Medical Advice    Who Called:  Patient  Best Call Back Number: [phone] (home)     Additional Information: Dr. Willis is asking office to fax the referral for him to [facility] at fax [phone] who will then okay it. Please call patient when completed.

## 2019-10-11 ENCOUNTER — PATIENT OUTREACH (OUTPATIENT)
Dept: ADMINISTRATIVE | Facility: HOSPITAL | Age: 64
End: 2019-10-11

## 2019-10-14 DIAGNOSIS — N20.0 KIDNEY STONES: ICD-10-CM

## 2019-10-14 RX ORDER — ALLOPURINOL 300 MG/1
TABLET ORAL
Qty: 90 TABLET | Refills: 1 | Status: SHIPPED | OUTPATIENT
Start: 2019-10-14 | End: 2020-04-13

## 2019-12-19 RX ORDER — ROPINIROLE 0.5 MG/1
TABLET, FILM COATED ORAL
Qty: 90 TABLET | Refills: 4 | Status: SHIPPED | OUTPATIENT
Start: 2019-12-19 | End: 2021-03-15

## 2019-12-23 DIAGNOSIS — R80.9 TYPE 2 DIABETES MELLITUS WITH MICROALBUMINURIA, WITHOUT LONG-TERM CURRENT USE OF INSULIN: ICD-10-CM

## 2019-12-23 DIAGNOSIS — E11.29 TYPE 2 DIABETES MELLITUS WITH MICROALBUMINURIA, WITHOUT LONG-TERM CURRENT USE OF INSULIN: ICD-10-CM

## 2019-12-23 RX ORDER — METFORMIN HYDROCHLORIDE 500 MG/1
1000 TABLET, EXTENDED RELEASE ORAL 2 TIMES DAILY WITH MEALS
Qty: 360 TABLET | Refills: 0 | Status: SHIPPED | OUTPATIENT
Start: 2019-12-23 | End: 2020-02-11 | Stop reason: DRUGHIGH

## 2019-12-23 NOTE — TELEPHONE ENCOUNTER
----- Message from Princess MARTINEZ Dickson sent at 12/23/2019 12:35 PM CST -----  Contact: patient  Type:  RX Refill Request    Who Called:  Patient  Refill or New Rx:  Refill  RX Name and Strength:  metFORMIN (GLUCOPHAGE-XR) 500 MG 24 hr tablet  How is the patient currently taking it? (ex. 1XDay):  Route: Take 2 tablets (1,000 mg total) by mouth 2 (two) times daily with meals.   Is this a 30 day or 90 day RX:  180  Preferred Pharmacy with phone number:    [facility] [address] [address]  Phone: [phone] Fax: [phone]  Local or Mail Order:  Local  Ordering Provider:  Dr. Nader Hess Call Back Number:    Additional Information:  Requesting a call when Rx is sent over. Patient has been taking the medication three times a day instead of two and now he doesn't have enough.

## 2019-12-27 ENCOUNTER — TELEPHONE (OUTPATIENT)
Dept: FAMILY MEDICINE | Facility: CLINIC | Age: 64
End: 2019-12-27

## 2019-12-27 NOTE — TELEPHONE ENCOUNTER
----- Message from Aisha Dotson sent at 12/27/2019  2:43 PM CST -----  Contact: patient  Type: Needs Medical Advice    Who Called:  Patient  Pharmacy name and phone #:    [facility] - [address] [address]  Phone: [phone] Fax: [phone]  Best Call Back Number:   Additional Information: [facility] to to [facility] them in regards to refill request, they need more information from the provider

## 2019-12-27 NOTE — TELEPHONE ENCOUNTER
Patient advised that [facility] needs a clarification for his Metformin.  The prescription says to take two 500mg tablets twice daily but also states to take one in the am and two in the pm. Please advise the correct dosage.

## 2019-12-27 NOTE — TELEPHONE ENCOUNTER
Attempted to reach patient to get more information.  No answer, left message with call back number.

## 2019-12-28 NOTE — TELEPHONE ENCOUNTER
The pt is correct as we made this change at his last visit    How are his diarrhea and sugar levels doing with this dose/

## 2019-12-30 NOTE — TELEPHONE ENCOUNTER
Called to inquire about patient's tolerance of the increased metformin dose.  No answer, left message with call back number.

## 2019-12-31 NOTE — TELEPHONE ENCOUNTER
"----- Message from Josh HAIRSTON Frird sent at 12/31/2019 11:22 AM CST -----  Contact: same  Patient called in and wanted office to know that he is doing "fine" with the new adjustments made in his Metformin.  Patient stated someone had called yesterday to find out.    Patient call back number is [phone]  "

## 2020-01-20 ENCOUNTER — PATIENT OUTREACH (OUTPATIENT)
Dept: ADMINISTRATIVE | Facility: HOSPITAL | Age: 65
End: 2020-01-20

## 2020-01-20 NOTE — PROGRESS NOTES
Health Maintenance Due   Topic Date Due    Shingles Vaccine (1 of 2) 10/07/2005     Future Appointments   Date Time Provider Department Center   1/30/2020 11:10 AM Blanca Doe MD [facility]   4/2/2020 10:00 AM LAB, MANAN [facility] [place]   4/7/2020 10:30 AM Blanca Doe MD [facility]

## 2020-01-30 ENCOUNTER — TELEPHONE (OUTPATIENT)
Dept: FAMILY MEDICINE | Facility: CLINIC | Age: 65
End: 2020-01-30

## 2020-01-30 ENCOUNTER — OFFICE VISIT (OUTPATIENT)
Dept: FAMILY MEDICINE | Facility: CLINIC | Age: 65
End: 2020-01-30
Payer: OTHER GOVERNMENT

## 2020-01-30 VITALS
SYSTOLIC BLOOD PRESSURE: 128 MMHG | BODY MASS INDEX: 31.13 KG/M2 | HEIGHT: 65 IN | HEART RATE: 66 BPM | RESPIRATION RATE: 16 BRPM | OXYGEN SATURATION: 97 % | DIASTOLIC BLOOD PRESSURE: 82 MMHG | TEMPERATURE: 98 F | WEIGHT: 186.81 LBS

## 2020-01-30 DIAGNOSIS — I48.19 OTHER PERSISTENT ATRIAL FIBRILLATION: ICD-10-CM

## 2020-01-30 DIAGNOSIS — Z01.818 PRE-OP EXAM: Primary | ICD-10-CM

## 2020-01-30 PROCEDURE — 93005 ELECTROCARDIOGRAM TRACING: CPT | Mod: S$GLB,,, | Performed by: FAMILY MEDICINE

## 2020-01-30 PROCEDURE — 93010 EKG 12-LEAD: ICD-10-PCS | Mod: S$GLB,,, | Performed by: INTERNAL MEDICINE

## 2020-01-30 PROCEDURE — 99214 OFFICE O/P EST MOD 30 MIN: CPT | Mod: S$GLB,,, | Performed by: FAMILY MEDICINE

## 2020-01-30 PROCEDURE — 99214 PR OFFICE/OUTPT VISIT, EST, LEVL IV, 30-39 MIN: ICD-10-PCS | Mod: S$GLB,,, | Performed by: FAMILY MEDICINE

## 2020-01-30 PROCEDURE — 93010 ELECTROCARDIOGRAM REPORT: CPT | Mod: S$GLB,,, | Performed by: INTERNAL MEDICINE

## 2020-01-30 PROCEDURE — 93005 EKG 12-LEAD: ICD-10-PCS | Mod: S$GLB,,, | Performed by: FAMILY MEDICINE

## 2020-01-30 RX ORDER — DORZOLAMIDE HYDROCHLORIDE AND TIMOLOL MALEATE 20; 5 MG/ML; MG/ML
1 SOLUTION/ DROPS OPHTHALMIC 2 TIMES DAILY
COMMUNITY
End: 2020-07-10 | Stop reason: CLARIF

## 2020-01-30 RX ORDER — OFLOXACIN 3 MG/ML
SOLUTION/ DROPS OPHTHALMIC
COMMUNITY
Start: 2019-11-06 | End: 2020-02-11 | Stop reason: CLARIF

## 2020-01-30 RX ORDER — BRIMONIDINE TARTRATE 2 MG/ML
SOLUTION/ DROPS OPHTHALMIC
COMMUNITY
Start: 2019-12-10 | End: 2020-02-11 | Stop reason: CLARIF

## 2020-01-31 ENCOUNTER — PATIENT MESSAGE (OUTPATIENT)
Dept: FAMILY MEDICINE | Facility: CLINIC | Age: 65
End: 2020-01-31

## 2020-01-31 DIAGNOSIS — I48.91 ATRIAL FIBRILLATION, UNSPECIFIED TYPE: Primary | ICD-10-CM

## 2020-01-31 NOTE — PROGRESS NOTES
Subjective:       Patient ID: Trevor Johnston is a 64 y.o. male.    Chief Complaint: surgery clearance    HPI   The patient is a 64-year-old who is here today for surgical clearance.  He is going to be having MAC for an aqueous shunt to treat his glaucoma.  He has had a lot of difficulties getting this surgery scheduled due to his insurance issues but it is finally scheduled and he is ready to move forward with the surgery.  His surgery date is February 4th.  He has not had any problems with anesthesia previously.  He has not not been sick at all denying any URI symptoms or fevers.  He is staying physically active and has no exercise intolerance, chest discomfort, or dyspnea on exertion.    His medical history is remarkable for hypertension which is well controlled with Avapro and diabetes which is well controlled with metformin    After obtaining his EKG, we did discuss the atrial fibrillation noted on his EKG.  He denies any palpitations, dizziness or lightheadedness.  He denies any presyncope or syncope.      Review of Systems   Constitutional: Negative for appetite change, chills, diaphoresis, fatigue, fever and unexpected weight change.   HENT: Negative for congestion, ear pain, postnasal drip, rhinorrhea, sinus pressure, sneezing, sore throat and trouble swallowing.    Eyes: Negative for pain, discharge and visual disturbance.   Respiratory: Negative for cough, chest tightness, shortness of breath and wheezing.    Cardiovascular: Negative for chest pain, palpitations and leg swelling.   Gastrointestinal: Negative for abdominal distention, abdominal pain, blood in stool, constipation, diarrhea, nausea and vomiting.   Skin: Negative for rash.       Objective:      Physical Exam   Constitutional: He is oriented to person, place, and time. He appears well-developed and well-nourished. No distress.   HENT:   Head: Normocephalic and atraumatic.   Right Ear: Hearing, tympanic membrane, external ear and ear canal  "normal.   Left Ear: Hearing, tympanic membrane, external ear and ear canal normal.   Nose: Nose normal.   Mouth/Throat: Oropharynx is clear and moist and mucous membranes are normal. No oral lesions. No oropharyngeal exudate, posterior oropharyngeal edema or posterior oropharyngeal erythema.   Eyes: Pupils are equal, round, and reactive to light. Conjunctivae, EOM and lids are normal. No scleral icterus.   Neck: Normal range of motion. Neck supple. Carotid bruit is not present. No thyroid mass and no thyromegaly present.   Cardiovascular: Normal rate and normal heart sounds. An irregular rhythm present.  No extrasystoles are present. PMI is not displaced. Exam reveals no gallop.   No murmur heard.  Pulmonary/Chest: Effort normal and breath sounds normal. No accessory muscle usage. No respiratory distress.   Clear to auscultation bilaterally.   Abdominal: Soft. Normal appearance and bowel sounds are normal. He exhibits no abdominal bruit. There is no hepatosplenomegaly. There is no tenderness. There is no rebound.   Lymphadenopathy:        Head (right side): No submental and no submandibular adenopathy present.        Head (left side): No submental and no submandibular adenopathy present.        Right cervical: No superficial cervical, no deep cervical and no posterior cervical adenopathy present.       Left cervical: No superficial cervical, no deep cervical and no posterior cervical adenopathy present.        Right: No supraclavicular adenopathy present.        Left: No supraclavicular adenopathy present.   Neurological: He is alert and oriented to person, place, and time.   Skin: Skin is warm, dry and intact.   Psychiatric: He has a normal mood and affect.     Blood pressure 128/82, pulse 66, temperature 97.6 °F (36.4 °C), temperature source Oral, resp. rate 16, height 5' 5" (1.651 m), weight 84.8 kg (186 lb 13.4 oz), SpO2 97 %.Body mass index is 31.09 kg/m².          A/P:  1)  glaucoma.  He is a low risk surgical " candidate for an aqueous shunt placement under MAC  2) atrial fibrillation with normal rate.  New.  Asymptomatic.  He may proceed with his eye surgery.  Once his eye surgery is completed, we will consider anticoagulation.  His chads Vasc score is 2 giving him a 2.2% stroke risk.  He understands the risks (particularly stroke risks) associated with atrial fibrillation.  After his surgery, we will schedule him with the cardiologist   3)  Hypertension.  Well controlled.  Continue with Avapro  4)  Diabetes.  Well controlled.  Continue with Glucophage    I will see him back in 3 months as previously planned or sooner if needed

## 2020-01-31 NOTE — TELEPHONE ENCOUNTER
Spoke with pt. Gave him the provider's message and he can make the appointment that I scheduled with Cardiology.

## 2020-02-03 ENCOUNTER — TELEPHONE (OUTPATIENT)
Dept: FAMILY MEDICINE | Facility: CLINIC | Age: 65
End: 2020-02-03

## 2020-02-03 NOTE — TELEPHONE ENCOUNTER
He is cleared for surgery  [facility] has everything to send   Pls verify with office they have it and then notify pt

## 2020-02-03 NOTE — TELEPHONE ENCOUNTER
----- Message from Janki Meza sent at 2/3/2020 10:11 AM CST -----  Contact: Trevor wagner  Type: Needs Medical Advice    Who Called:  Trevor Hess Call Back Number: [phone]  Additional Information: Pls call pt ASAP regarding his clearance for his surgery. The office is telling him if they do no receive it in a few hours they will cancel his surgery. It needs to be sent to [facility] in [place].

## 2020-02-06 ENCOUNTER — OFFICE VISIT (OUTPATIENT)
Dept: CARDIOLOGY | Facility: CLINIC | Age: 65
End: 2020-02-06
Payer: OTHER GOVERNMENT

## 2020-02-06 VITALS
SYSTOLIC BLOOD PRESSURE: 147 MMHG | HEART RATE: 70 BPM | HEIGHT: 65 IN | DIASTOLIC BLOOD PRESSURE: 88 MMHG | WEIGHT: 185.44 LBS | BODY MASS INDEX: 30.89 KG/M2

## 2020-02-06 DIAGNOSIS — I15.2 HYPERTENSION ASSOCIATED WITH DIABETES: ICD-10-CM

## 2020-02-06 DIAGNOSIS — I48.0 PAF (PAROXYSMAL ATRIAL FIBRILLATION): ICD-10-CM

## 2020-02-06 DIAGNOSIS — E78.2 MIXED HYPERLIPIDEMIA: Primary | ICD-10-CM

## 2020-02-06 DIAGNOSIS — E11.59 HYPERTENSION ASSOCIATED WITH DIABETES: ICD-10-CM

## 2020-02-06 PROCEDURE — 99244 OFF/OP CNSLTJ NEW/EST MOD 40: CPT | Mod: S$PBB,,, | Performed by: INTERNAL MEDICINE

## 2020-02-06 PROCEDURE — 99999 PR PBB SHADOW E&M-EST. PATIENT-LVL III: ICD-10-PCS | Mod: PBBFAC,,, | Performed by: INTERNAL MEDICINE

## 2020-02-06 PROCEDURE — 99213 OFFICE O/P EST LOW 20 MIN: CPT | Mod: PBBFAC,PO | Performed by: INTERNAL MEDICINE

## 2020-02-06 PROCEDURE — 99999 PR PBB SHADOW E&M-EST. PATIENT-LVL III: CPT | Mod: PBBFAC,,, | Performed by: INTERNAL MEDICINE

## 2020-02-06 PROCEDURE — 99244 PR OFFICE CONSULTATION,LEVEL IV: ICD-10-PCS | Mod: S$PBB,,, | Performed by: INTERNAL MEDICINE

## 2020-02-06 RX ORDER — METOPROLOL SUCCINATE 25 MG/1
25 TABLET, EXTENDED RELEASE ORAL DAILY
Qty: 90 TABLET | Refills: 6 | Status: SHIPPED | OUTPATIENT
Start: 2020-02-06 | End: 2020-08-06 | Stop reason: SDUPTHER

## 2020-02-06 RX ORDER — PREDNISOLONE ACETATE 10 MG/ML
1 SUSPENSION/ DROPS OPHTHALMIC 3 TIMES DAILY
COMMUNITY
End: 2020-07-14 | Stop reason: DRUGHIGH

## 2020-02-06 NOTE — LETTER
February 6, 2020      Blanca Doe MD  [id number] [address] [address]           [facility]  [address] [place] [address]  Phone: [phone]          Patient: Trevor Johnston   MR Number: [id number]   YOB: 1955   Date of Visit: 2/6/2020       Dear Dr. Blanca Doe:    Thank you for referring Trevor Johnston to me for evaluation. Attached you will find relevant portions of my assessment and plan of care.    If you have questions, please do not hesitate to call me. I look forward to following Trevor Johnston along with you.    Sincerely,    Rodrigo Isabel MD    Enclosure  CC:  No Recipients    If you would like to receive this communication electronically, please contact [email or web address] or [phone] to request more information on [employer] access.    For providers and/or their staff who would like to refer a patient to [facility], please contact us through our one-stop-shop provider referral line, [facility], at [phone].    If you feel you have received this communication in error or would no longer like to receive these types of communications, please e-mail [email or web address]

## 2020-02-06 NOTE — PROGRESS NOTES
Subjective:    Patient ID:  Trevor Johnston is a 64 y.o. male who presents for evaluation of Consult A-fib      HPI  Referred here by Dr. Doe to evaluate  new dx AF. Active man no exercise denies CP or change in exertional tolerance. Patient denies palpitations, syncope, presyncope, lightheadedness or dizziness.      Review of Systems   Constitution: Negative for malaise/fatigue.   Eyes: Negative for blurred vision.   Cardiovascular: Negative for chest pain, claudication, cyanosis, dyspnea on exertion, irregular heartbeat, leg swelling, near-syncope, orthopnea, palpitations, paroxysmal nocturnal dyspnea and syncope.   Respiratory: Negative for cough and shortness of breath.    Hematologic/Lymphatic: Does not bruise/bleed easily.   Musculoskeletal: Negative for back pain, falls, joint pain, muscle cramps, muscle weakness and myalgias.   Gastrointestinal: Negative for abdominal pain, change in bowel habit, nausea and vomiting.   Genitourinary: Negative for urgency.   Neurological: Negative for dizziness, focal weakness and light-headedness.       Past Medical History:   Diagnosis Date    Diabetes mellitus, type 2     Hyperlipidemia     Hypertension     Kidney stones     multiple episodes - on allopurinol    Macular degeneration, right eye     ROBERT (obstructive sleep apnea)     RLS (restless legs syndrome)      Patient Active Problem List   Diagnosis    Trigger finger of right thumb    Hyperlipidemia    Hypertension associated with diabetes    BMI 28.0-28.9,adult    Type 2 diabetes mellitus with microalbuminuria, without long-term current use of insulin    Kidney stones    Other hemorrhoids    Chronic gout without tophus    Former cigarette smoker    Normocytic anemia    PAF (paroxysmal atrial fibrillation)        Objective:     Vitals:    02/06/20 1017   BP: (!) 147/88   Pulse: 70        Physical Exam   Constitutional: He is oriented to person, place, and time. He appears well-developed and  well-nourished. He is cooperative.   HENT:   Head: Normocephalic and atraumatic.   Eyes: Conjunctivae are normal. Right eye exhibits no exudate. Left eye exhibits no exudate.   Neck: Normal range of motion. Neck supple. Normal carotid pulses and no JVD present. Carotid bruit is not present. No thyromegaly present.   Cardiovascular: Normal rate, normal heart sounds and intact distal pulses. An irregularly irregular rhythm present.   Pulses:       Carotid pulses are 2+ on the right side, and 2+ on the left side.       Radial pulses are 2+ on the right side, and 2+ on the left side.        Dorsalis pedis pulses are 2+ on the right side, and 2+ on the left side.        Posterior tibial pulses are 2+ on the right side, and 2+ on the left side.   Pulmonary/Chest: Effort normal and breath sounds normal.   Abdominal: Soft. Bowel sounds are normal.   Musculoskeletal: Normal range of motion. He exhibits no edema.   Neurological: He is alert and oriented to person, place, and time. Gait normal.   Skin: Skin is warm, dry and intact. No cyanosis. Nails show no clubbing.   Psychiatric: He has a normal mood and affect. His speech is normal and behavior is normal. Judgment and thought content normal.   Nursing note and vitals reviewed.            ..    Chemistry        Component Value Date/Time     09/27/2019 0754    K 3.8 09/27/2019 0754     09/27/2019 0754    CO2 26 09/27/2019 0754    BUN 16 09/27/2019 0754    CREATININE 1.0 09/27/2019 0754     09/27/2019 0754        Component Value Date/Time    CALCIUM 9.6 09/27/2019 0754    ALKPHOS 84 09/27/2019 0754    AST 25 09/27/2019 0754    ALT 41 09/27/2019 0754    BILITOT 0.7 09/27/2019 0754    ESTGFRAFRICA >60.0 09/27/2019 0754    EGFRNONAA >60.0 09/27/2019 0754            ..  Lab Results   Component Value Date    CHOL 135 09/27/2019    CHOL 155 03/21/2019    CHOL 119 (L) 09/18/2018     Lab Results   Component Value Date    HDL 40 09/27/2019    HDL 36 (L) 03/21/2019     HDL 37 (L) 09/18/2018     Lab Results   Component Value Date    LDLCALC 69.0 09/27/2019    LDLCALC 85.2 03/21/2019    LDLCALC 65.8 09/18/2018     Lab Results   Component Value Date    TRIG 130 09/27/2019    TRIG 169 (H) 03/21/2019    TRIG 81 09/18/2018     Lab Results   Component Value Date    CHOLHDL 29.6 09/27/2019    CHOLHDL 23.2 03/21/2019    CHOLHDL 31.1 09/18/2018     ..  Lab Results   Component Value Date    WBC 9.89 09/27/2019    HGB 13.2 (L) 09/27/2019    HCT 42.1 09/27/2019    MCV 97 09/27/2019     09/27/2019       Test(s) Reviewed  I have reviewed the following in detail:  [] Stress test   [] Angiography   [x] Echocardiogram   [x] Labs   [] Other:       Assessment:         ICD-10-CM ICD-9-CM   1. Mixed hyperlipidemia E78.2 272.2   2. Hypertension associated with diabetes E11.59 250.80    I10 401.9   3. PAF (paroxysmal atrial fibrillation) I48.0 427.31     Problem List Items Addressed This Visit     PAF (paroxysmal atrial fibrillation)    Hypertension associated with diabetes    Hyperlipidemia - Primary           Plan:           Return to clinic 4 months   Low level/low impact aerobic exercise 5x's/wk. Heart healthy diet and risk factor modification.    See labs and med orders.  Star NOAC, BB    Orders Placed This Encounter   Procedures    Lipid panel    TSH    T4, free    Comprehensive metabolic panel    CV Ultrasound Bilateral Doppler Carotid    Nuclear Stress - Cardiology Interpreted    CV Abdominal Aorta    Echo Color Flow Doppler? Yes         Portions of this note may have been created with voice recognition software.  Grammatical, syntax and spelling errors may be inevitable.

## 2020-02-07 ENCOUNTER — TELEPHONE (OUTPATIENT)
Dept: CARDIOLOGY | Facility: CLINIC | Age: 65
End: 2020-02-07

## 2020-02-07 NOTE — TELEPHONE ENCOUNTER
Spoke with pt. Stated he took Xeralto last night and when he went to the restroom he saw an red looking in toilet. But only use restroom once. Advise pt. To watch over week end and call Monday morning if urin still reddish looking. Pt. Verbalized understanding.

## 2020-02-07 NOTE — TELEPHONE ENCOUNTER
----- Message from Olimpia Altamirano sent at 2/7/2020  1:53 PM CST -----  Contact: Self  Pt is getting side effects with new prescribtions- Xarelto & Metoprolol.  Thanks     [phone]

## 2020-02-11 PROBLEM — N13.9 OBSTRUCTIVE UROPATHY: Status: ACTIVE | Noted: 2020-02-11

## 2020-02-11 PROBLEM — N17.9 AKI (ACUTE KIDNEY INJURY): Status: ACTIVE | Noted: 2020-02-11

## 2020-02-11 PROBLEM — N13.9 OBSTRUCTED, UROPATHY: Status: ACTIVE | Noted: 2020-02-11

## 2020-02-18 ENCOUNTER — OFFICE VISIT (OUTPATIENT)
Dept: FAMILY MEDICINE | Facility: CLINIC | Age: 65
End: 2020-02-18
Payer: OTHER GOVERNMENT

## 2020-02-18 VITALS
SYSTOLIC BLOOD PRESSURE: 130 MMHG | WEIGHT: 182.31 LBS | HEART RATE: 73 BPM | DIASTOLIC BLOOD PRESSURE: 80 MMHG | OXYGEN SATURATION: 97 % | BODY MASS INDEX: 29.3 KG/M2 | HEIGHT: 66 IN

## 2020-02-18 DIAGNOSIS — Z11.4 SCREENING FOR HIV WITHOUT PRESENCE OF RISK FACTORS: ICD-10-CM

## 2020-02-18 DIAGNOSIS — N20.0 NEPHROLITHIASIS: ICD-10-CM

## 2020-02-18 DIAGNOSIS — I48.0 PAF (PAROXYSMAL ATRIAL FIBRILLATION): Primary | ICD-10-CM

## 2020-02-18 DIAGNOSIS — N13.30 HYDRONEPHROSIS, UNSPECIFIED HYDRONEPHROSIS TYPE: ICD-10-CM

## 2020-02-18 PROCEDURE — 99214 PR OFFICE/OUTPT VISIT, EST, LEVL IV, 30-39 MIN: ICD-10-PCS | Mod: S$GLB,,, | Performed by: FAMILY MEDICINE

## 2020-02-18 PROCEDURE — 99214 OFFICE O/P EST MOD 30 MIN: CPT | Mod: S$GLB,,, | Performed by: FAMILY MEDICINE

## 2020-02-18 RX ORDER — OXYBUTYNIN CHLORIDE 10 MG/1
10 TABLET, EXTENDED RELEASE ORAL NIGHTLY
COMMUNITY
Start: 2020-02-13 | End: 2020-07-10 | Stop reason: CLARIF

## 2020-02-18 RX ORDER — TAMSULOSIN HYDROCHLORIDE 0.4 MG/1
0.4 CAPSULE ORAL NIGHTLY
COMMUNITY
Start: 2020-02-13 | End: 2020-07-10 | Stop reason: CLARIF

## 2020-02-18 NOTE — PATIENT INSTRUCTIONS
Call Dr Meng and ask if he will still do injections while you are taking xarelto.  If he says no, let me know

## 2020-02-19 ENCOUNTER — PATIENT MESSAGE (OUTPATIENT)
Dept: FAMILY MEDICINE | Facility: CLINIC | Age: 65
End: 2020-02-19

## 2020-02-20 ENCOUNTER — TELEPHONE (OUTPATIENT)
Dept: FAMILY MEDICINE | Facility: CLINIC | Age: 65
End: 2020-02-20

## 2020-02-20 DIAGNOSIS — N20.0 KIDNEY STONE: Primary | ICD-10-CM

## 2020-02-20 NOTE — TELEPHONE ENCOUNTER
Please see message below.Pt had kidney stones and stint. [facility] in his network. The one he went to today was not.

## 2020-02-20 NOTE — TELEPHONE ENCOUNTER
----- Message from Julien Turner sent at 2/20/2020 11:59 AM CST -----  Contact: Ptnt  [phone]  Type: Needs Medical Advice    Who Called: Ptnt     Additional Information:     Advised needs a referral to urology wants to know which Dr would be best to see.    [facility] on [address]    Dr Sb Connelly or Dr. Carlos Neff   [phone]    Please advise.

## 2020-02-21 ENCOUNTER — TELEPHONE (OUTPATIENT)
Dept: FAMILY MEDICINE | Facility: CLINIC | Age: 65
End: 2020-02-21

## 2020-02-21 NOTE — PROGRESS NOTES
Subjective:       Patient ID: Trevor Johnston is a 64 y.o. male.    Chief Complaint: Hospital Follow Up    HPI   The patient is a 64-year-old who is here today for recent follow-up.    Since our last visit, he did have his eye surgery on February 4th and this went well.  He saw Dr. Isabel on February 6th for the AFib noted on his preop EKG.  At that visit, he was started on Toprol XL 25 mg once a day and Xarelto and he was scheduled for additional cardiac testing which is to be completed soon.  Shortly after starting the Xarelto, he started urinating blood and had left-sided pain reminding him of what he experienced previously with kidney stones.  He went to the ER on February 11th.  Imaging in the ER revealed he had a 12 mm calculus at the left UVJ and a 9 mm calculus in the distal left proximal ureter causing hydronephrosis.  He underwent surgery with stent placement.  He is due to follow up with his urologist soon.  At the time of his hospital discharge, he was started on Flomax and Ditropan which he has been taking consistently.  He has continued to take the Toprol and the Xarelto which he has tolerating well    Review of Systems   Constitutional: Negative for appetite change, chills, diaphoresis, fatigue, fever and unexpected weight change.   HENT: Negative for congestion, ear pain, postnasal drip, rhinorrhea, sinus pressure, sneezing, sore throat and trouble swallowing.    Eyes: Negative for pain, discharge and visual disturbance.   Respiratory: Negative for cough, chest tightness, shortness of breath and wheezing.    Cardiovascular: Negative for chest pain, palpitations and leg swelling.   Gastrointestinal: Negative for abdominal distention, abdominal pain, blood in stool, constipation, diarrhea, nausea and vomiting.   Skin: Negative for rash.       Objective:      Physical Exam   Constitutional: He is oriented to person, place, and time. He appears well-developed and well-nourished. No distress.   HENT:  "  Head: Normocephalic and atraumatic.   Right Ear: Hearing, tympanic membrane, external ear and ear canal normal.   Left Ear: Hearing, tympanic membrane, external ear and ear canal normal.   Nose: Nose normal.   Mouth/Throat: Oropharynx is clear and moist and mucous membranes are normal. No oral lesions. No oropharyngeal exudate, posterior oropharyngeal edema or posterior oropharyngeal erythema.   Eyes: Pupils are equal, round, and reactive to light. Conjunctivae, EOM and lids are normal. No scleral icterus.   Neck: Normal range of motion. Neck supple. Carotid bruit is not present. No thyroid mass and no thyromegaly present.   Cardiovascular: Normal rate and normal heart sounds. An irregularly irregular rhythm present.  No extrasystoles are present. PMI is not displaced. Exam reveals no gallop.   No murmur heard.  Pulmonary/Chest: Effort normal and breath sounds normal. No accessory muscle usage. No respiratory distress.   Clear to auscultation bilaterally.   Abdominal: Soft. Normal appearance and bowel sounds are normal. He exhibits no abdominal bruit. There is no hepatosplenomegaly. There is no tenderness. There is no rebound.   Lymphadenopathy:        Head (right side): No submental and no submandibular adenopathy present.        Head (left side): No submental and no submandibular adenopathy present.        Right cervical: No superficial cervical, no deep cervical and no posterior cervical adenopathy present.       Left cervical: No superficial cervical, no deep cervical and no posterior cervical adenopathy present.        Right: No supraclavicular adenopathy present.        Left: No supraclavicular adenopathy present.   Neurological: He is alert and oriented to person, place, and time.   Skin: Skin is warm, dry and intact.   Psychiatric: He has a normal mood and affect.     Blood pressure 130/80, pulse 73, height 5' 6" (1.676 m), weight 82.7 kg (182 lb 5.1 oz), SpO2 97 %.Body mass index is 29.43 kg/m².    "       A/P:  1) PAF.  Asymptomatic.  Continue with Toprol and Xarelto.  Follow up with Cardiology as planned with additional testing later this month.  He will clarify with his ophthalmologist whether not he can still received the eye injections with the Xarelto as there may be a procedural alternative to Xarelto  2) nephrolithiasis with hydronephrosis status post intervention with stent placement.  Follow up with Urology as planned        I will see him back as previously planned or sooner if needed

## 2020-02-21 NOTE — TELEPHONE ENCOUNTER
Please see urology referral and advise regarding  authorization. Has appt with Dr. Connelly on Monday 2/24/2020

## 2020-02-21 NOTE — TELEPHONE ENCOUNTER
----- Message from Nicola Barrett sent at 2/21/2020  2:12 PM CST -----  Contact: pt   Type: Needs Medical Advice    Who Called:  Pt   Symptoms (please be specific):    How long has patient had these symptoms:    Pharmacy name and phone #:    Best Call Back Number:    Additional Information:pt called wanted you guys to send another referral to [facility] again cause they said they didn't receive it. Pt also wanted to know if he has an appt with Dr. Connelly Monday 02/24/2020 @9:00 am    Fax # [phone]

## 2020-03-04 ENCOUNTER — TELEPHONE (OUTPATIENT)
Dept: FAMILY MEDICINE | Facility: CLINIC | Age: 65
End: 2020-03-04

## 2020-03-04 DIAGNOSIS — N20.0 KIDNEY STONE: Primary | ICD-10-CM

## 2020-03-04 NOTE — TELEPHONE ENCOUNTER
----- Message from Mattie Bolden sent at 3/4/2020  9:20 AM CST -----  Contact: Tish/ Urology  Type: Needs Medical Advice  Who Called:  Tish Hess Call Back Number: [phone]  Additional Information: Tish states that patient needs a referral due to patient needs a surgical procedure Ureteroscope on Thursday 3/5/20.  Please call today and send referral.  Thanks!

## 2020-03-04 NOTE — TELEPHONE ENCOUNTER
Tish with Dr. Connelly's office is requesting a new referral for procedure. Scheduled for cystoscope with lithotripsy at [facility] on 3/5/2020. Previous auth was for non-surgical visit and insurance ([employer] patient) requires referral from PCP.     Fax to [phone], attn Tish

## 2020-03-05 PROBLEM — N20.0 KIDNEY STONE: Status: ACTIVE | Noted: 2020-03-05

## 2020-03-09 DIAGNOSIS — I48.0 PAF (PAROXYSMAL ATRIAL FIBRILLATION): Primary | ICD-10-CM

## 2020-03-25 ENCOUNTER — PATIENT MESSAGE (OUTPATIENT)
Dept: FAMILY MEDICINE | Facility: CLINIC | Age: 65
End: 2020-03-25

## 2020-04-01 RX ORDER — METFORMIN HYDROCHLORIDE 500 MG/1
TABLET, EXTENDED RELEASE ORAL
Qty: 360 TABLET | Refills: 1 | Status: SHIPPED | OUTPATIENT
Start: 2020-04-01 | End: 2020-07-14 | Stop reason: DRUGHIGH

## 2020-04-11 DIAGNOSIS — N20.0 KIDNEY STONES: ICD-10-CM

## 2020-04-13 RX ORDER — ALLOPURINOL 300 MG/1
TABLET ORAL
Qty: 90 TABLET | Refills: 0 | Status: SHIPPED | OUTPATIENT
Start: 2020-04-13 | End: 2020-07-10

## 2020-05-05 ENCOUNTER — PATIENT MESSAGE (OUTPATIENT)
Dept: ADMINISTRATIVE | Facility: HOSPITAL | Age: 65
End: 2020-05-05

## 2020-05-13 ENCOUNTER — PATIENT MESSAGE (OUTPATIENT)
Dept: CARDIOLOGY | Facility: CLINIC | Age: 65
End: 2020-05-13

## 2020-05-18 ENCOUNTER — PATIENT MESSAGE (OUTPATIENT)
Dept: CARDIOLOGY | Facility: CLINIC | Age: 65
End: 2020-05-18

## 2020-05-18 ENCOUNTER — TELEPHONE (OUTPATIENT)
Dept: CARDIOLOGY | Facility: CLINIC | Age: 65
End: 2020-05-18

## 2020-05-18 NOTE — TELEPHONE ENCOUNTER
----- Message from Kendal Weinberg sent at 5/18/2020 12:09 PM CDT -----  Contact: Patient  Type: Needs Medical Advice  Who Called:  Patient  Best Call Back Number: [phone] (home)   Additional Information: The pat would like a call back to reschedule stress test and other test that were canc due Covid19--the patient would like a nurse to call him back he needs to tell her what other test he needs

## 2020-05-26 ENCOUNTER — TELEPHONE (OUTPATIENT)
Dept: CARDIOLOGY | Facility: CLINIC | Age: 65
End: 2020-05-26

## 2020-05-26 NOTE — TELEPHONE ENCOUNTER
----- Message from Cat Parks sent at 5/26/2020 11:43 AM CDT -----  Pt called to reschedule appt and asked foir a call back with appt date and time.      Pt contact # [phone].      Thanks

## 2020-05-26 NOTE — TELEPHONE ENCOUNTER
Spoke with patient scheduled first available appointment with provider. Patient accepted date and time.

## 2020-06-10 ENCOUNTER — OFFICE VISIT (OUTPATIENT)
Dept: FAMILY MEDICINE | Facility: CLINIC | Age: 65
End: 2020-06-10
Payer: OTHER GOVERNMENT

## 2020-06-10 VITALS
BODY MASS INDEX: 28.31 KG/M2 | SYSTOLIC BLOOD PRESSURE: 128 MMHG | HEART RATE: 82 BPM | WEIGHT: 176.13 LBS | TEMPERATURE: 98 F | RESPIRATION RATE: 20 BRPM | HEIGHT: 66 IN | DIASTOLIC BLOOD PRESSURE: 88 MMHG

## 2020-06-10 DIAGNOSIS — M77.8 TENDINITIS OF LEFT SHOULDER: Primary | ICD-10-CM

## 2020-06-10 PROCEDURE — 99214 PR OFFICE/OUTPT VISIT, EST, LEVL IV, 30-39 MIN: ICD-10-PCS | Mod: S$GLB,,, | Performed by: NURSE PRACTITIONER

## 2020-06-10 PROCEDURE — 99214 OFFICE O/P EST MOD 30 MIN: CPT | Mod: S$GLB,,, | Performed by: NURSE PRACTITIONER

## 2020-06-10 RX ORDER — TIZANIDINE 4 MG/1
4 TABLET ORAL NIGHTLY PRN
Qty: 14 TABLET | Refills: 0 | Status: SHIPPED | OUTPATIENT
Start: 2020-06-10 | End: 2020-06-20

## 2020-06-10 RX ORDER — NAPROXEN 500 MG/1
500 TABLET ORAL 2 TIMES DAILY
Qty: 30 TABLET | Refills: 0 | Status: SHIPPED | OUTPATIENT
Start: 2020-06-10 | End: 2020-06-25

## 2020-06-10 RX ORDER — TIMOLOL MALEATE 5 MG/ML
1 SOLUTION/ DROPS OPHTHALMIC DAILY
COMMUNITY
Start: 2020-06-04 | End: 2020-07-14 | Stop reason: DRUGHIGH

## 2020-06-10 NOTE — PROGRESS NOTES
"Subjective:       Patient ID: Trevor Johnston is a 64 y.o. male.    Chief Complaint: Shoulder Pain    Patient had rotator cuff repair to the left shoulder 5 years ago.    Shoulder Pain    This is a new problem. The current episode started in the past 7 days. There has been a history of trauma (used a saw a few days, just pieces of wood). The problem occurs constantly. The problem has been unchanged. Pertinent negatives include no headaches. Treatments tried: aspercream, old pain pill. The treatment provided mild relief.     Review of Systems   Constitutional: Negative for activity change and appetite change.   HENT: Negative for congestion, postnasal drip, rhinorrhea and sinus pressure.    Eyes: Negative for pain and redness.   Respiratory: Negative for choking and chest tightness.    Gastrointestinal: Negative for abdominal distention, abdominal pain, blood in stool, constipation, diarrhea, nausea and vomiting.   Endocrine: Negative for polydipsia and polyphagia.   Genitourinary: Negative for dysuria and hematuria.   Musculoskeletal: Positive for arthralgias. Negative for myalgias.   Skin: Negative for color change and rash.   Neurological: Negative for dizziness and headaches.   Psychiatric/Behavioral: Negative for agitation and behavioral problems.       Past medical, surgical, family and social history reviewed.  Objective:     Vitals:    06/10/20 1027   BP: 128/88   Pulse: 82   Resp: 20   Temp: 98.4 °F (36.9 °C)   TempSrc: Oral   Weight: 79.9 kg (176 lb 2.4 oz)   Height: 5' 6" (1.676 m)   PainSc:   4   PainLoc: Shoulder     Body mass index is 28.43 kg/m².     Physical Exam   Constitutional: He is oriented to person, place, and time. He appears well-developed and well-nourished.   HENT:   Head: Normocephalic and atraumatic.   Right Ear: External ear normal.   Left Ear: External ear normal.   Nose: Nose normal.   Mouth/Throat: Oropharynx is clear and moist.   Eyes: Conjunctivae are normal. Right eye exhibits no " discharge. Left eye exhibits no discharge. No scleral icterus.   Neck: Normal range of motion. Neck supple. No tracheal deviation present.   Cardiovascular: Normal rate, regular rhythm and normal heart sounds. Exam reveals no friction rub.   No murmur heard.  Pulmonary/Chest: Effort normal and breath sounds normal. No stridor. No respiratory distress. He has no wheezes. He has no rales. He exhibits no tenderness.   Musculoskeletal:        Left shoulder: He exhibits decreased range of motion and tenderness.   Tenderness over the posterior left shoulder.   Lymphadenopathy:     He has no cervical adenopathy.   Neurological: He is alert and oriented to person, place, and time.   Skin: Skin is warm and dry.   Psychiatric: He has a normal mood and affect.       Assessment:       1. Tendinitis of left shoulder        Plan:       Trevor was seen today for shoulder pain.    Diagnoses and all orders for this visit:    Tendinitis of left shoulder    -     tiZANidine (ZANAFLEX) 4 MG tablet; Take 1 tablet (4 mg total) by mouth nightly as needed.  -     naproxen (NAPROSYN) 500 MG tablet; Take 1 tablet (500 mg total) by mouth 2 (two) times daily. for 15 days

## 2020-06-10 NOTE — PATIENT INSTRUCTIONS
Shoulder and Upper Back Stretch  To start, stand tall with your ears, shoulders, and hips in line. Your feet should be slightly apart, positioned just under your hips. Focus your eyes directly in front of you.  this position for a few seconds before starting your exercise. This helps increase your awareness of proper posture.          Reach overhead and slightly back with both arms. Keep your shoulders and neck aligned and your elbows behind your shoulders:  · With your palms facing the ceiling, turn your fingers inward.  · Take a deep breath. Breathe out, and lower your elbows toward your buttocks. Hold for 5 seconds, then return to starting position.  · Repeat 3 times.  Date Last Reviewed: 8/16/2015  © 8260-7148 [employer], [employer]. [address] [place] [address], [place] [address]. All rights reserved. This information is not intended as a substitute for professional medical care. Always follow your healthcare professional's instructions.

## 2020-06-16 ENCOUNTER — HOSPITAL ENCOUNTER (OUTPATIENT)
Dept: RADIOLOGY | Facility: HOSPITAL | Age: 65
Discharge: HOME | End: 2020-06-16
Attending: INTERNAL MEDICINE
Payer: OTHER GOVERNMENT

## 2020-06-16 ENCOUNTER — CLINICAL SUPPORT (OUTPATIENT)
Dept: CARDIOLOGY | Facility: CLINIC | Age: 65
End: 2020-06-16
Attending: INTERNAL MEDICINE
Payer: OTHER GOVERNMENT

## 2020-06-16 VITALS
WEIGHT: 176.13 LBS | HEIGHT: 66 IN | HEART RATE: 59 BPM | DIASTOLIC BLOOD PRESSURE: 80 MMHG | SYSTOLIC BLOOD PRESSURE: 130 MMHG | BODY MASS INDEX: 28.31 KG/M2

## 2020-06-16 VITALS — HEIGHT: 66 IN | WEIGHT: 176 LBS | BODY MASS INDEX: 28.28 KG/M2

## 2020-06-16 DIAGNOSIS — E78.2 MIXED HYPERLIPIDEMIA: ICD-10-CM

## 2020-06-16 DIAGNOSIS — I15.2 HYPERTENSION ASSOCIATED WITH DIABETES: ICD-10-CM

## 2020-06-16 DIAGNOSIS — I48.0 PAF (PAROXYSMAL ATRIAL FIBRILLATION): ICD-10-CM

## 2020-06-16 DIAGNOSIS — E11.59 HYPERTENSION ASSOCIATED WITH DIABETES: ICD-10-CM

## 2020-06-16 LAB
ABDOMINAL IMA AP: 1.5 CM
ABDOMINAL IMA ED VEL: 0 CM/S
ABDOMINAL IMA PS VEL: 63 CM/S
ABDOMINAL IMA TRANS: 1.5 CM
ABDOMINAL INFRARENAL AORTA AP: 1.9 CM
ABDOMINAL INFRARENAL AORTA ED VEL: 0 CM/S
ABDOMINAL INFRARENAL AORTA PS VEL: 61 CM/S
ABDOMINAL INFRARENAL AORTA TRANS: 1.9 CM
ABDOMINAL JUXTARENAL AORTA AP: 1.7 CM
ABDOMINAL JUXTARENAL AORTA ED VEL: 47 CM/S
ABDOMINAL JUXTARENAL AORTA PS VEL: 172 CM/S
ABDOMINAL JUXTARENAL AORTA TRANS: 1.8 CM
ABDOMINAL LT COM ILIAC AP: 0.8 CM
ABDOMINAL LT COM ILIAC TRANS: 0.9 CM
ABDOMINAL LT COM ILIAC VEL: 113 CM/S
ABDOMINAL RT COM ILIAC AP: 0.8 CM
ABDOMINAL RT COM ILIAC TRANS: 0.8 CM
ABDOMINAL RT COM ILIAC VEL: 186 CM/S
ABDOMINAL RT COM ILLIAC ED VEL: 0 CM/S
ABDOMINAL SUPRARENAL AORTA AP: 2 CM
ABDOMINAL SUPRARENAL AORTA ED VEL: 34 CM/S
ABDOMINAL SUPRARENAL AORTA PS VEL: 86 CM/S
ABDOMINAL SUPRARENAL AORTA TRANS: 2 CM
ASCENDING AORTA: 2.67 CM
AV INDEX (PROSTH): 0.65
AV MEAN GRADIENT: 4 MMHG
AV PEAK GRADIENT: 8 MMHG
AV VALVE AREA: 2.18 CM2
AV VELOCITY RATIO: 0.67
BSA FOR ECHO PROCEDURE: 1.93 M2
CV ECHO LV RWT: 0.42 CM
CV STRESS BASE HR: 69 BPM
DIASTOLIC BLOOD PRESSURE: 99 MMHG
DOP CALC AO PEAK VEL: 1.38 M/S
DOP CALC AO VTI: 27.41 CM
DOP CALC LVOT AREA: 3.3 CM2
DOP CALC LVOT DIAMETER: 2.06 CM
DOP CALC LVOT PEAK VEL: 0.92 M/S
DOP CALC LVOT STROKE VOLUME: 59.8 CM3
DOP CALCLVOT PEAK VEL VTI: 17.95 CM
E WAVE DECELERATION TIME: 163.09 MSEC
E/A RATIO: 6.18
ECHO LV POSTERIOR WALL: 1.01 CM (ref 0.6–1.1)
FRACTIONAL SHORTENING: 28 % (ref 28–44)
INTERVENTRICULAR SEPTUM: 0.91 CM (ref 0.6–1.1)
IVRT: 88.49 MSEC
LA MAJOR: 6.44 CM
LA MINOR: 5.36 CM
LA WIDTH: 3.72 CM
LEFT ARM DIASTOLIC BLOOD PRESSURE: 88 MMHG
LEFT ARM SYSTOLIC BLOOD PRESSURE: 128 MMHG
LEFT ATRIUM SIZE: 4.22 CM
LEFT ATRIUM VOLUME INDEX: 41.2 ML/M2
LEFT ATRIUM VOLUME: 78.07 CM3
LEFT CBA DIAS: 22 CM/S
LEFT CBA SYS: 63 CM/S
LEFT CCA DIST DIAS: 30 CM/S
LEFT CCA DIST SYS: 83 CM/S
LEFT CCA MID DIAS: 28 CM/S
LEFT CCA MID SYS: 94 CM/S
LEFT CCA PROX DIAS: 18 CM/S
LEFT CCA PROX SYS: 76 CM/S
LEFT ECA DIAS: 22 CM/S
LEFT ECA SYS: 71 CM/S
LEFT ICA DIST DIAS: 45 CM/S
LEFT ICA DIST SYS: 81 CM/S
LEFT ICA MID DIAS: 32 CM/S
LEFT ICA MID SYS: 73 CM/S
LEFT ICA PROX DIAS: 28 CM/S
LEFT ICA PROX SYS: 78 CM/S
LEFT INTERNAL DIMENSION IN SYSTOLE: 3.44 CM (ref 2.1–4)
LEFT VENTRICLE DIASTOLIC VOLUME INDEX: 57.07 ML/M2
LEFT VENTRICLE DIASTOLIC VOLUME: 108.14 ML
LEFT VENTRICLE MASS INDEX: 85 G/M2
LEFT VENTRICLE SYSTOLIC VOLUME INDEX: 25.8 ML/M2
LEFT VENTRICLE SYSTOLIC VOLUME: 48.91 ML
LEFT VENTRICULAR INTERNAL DIMENSION IN DIASTOLE: 4.81 CM (ref 3.5–6)
LEFT VENTRICULAR MASS: 161.62 G
LEFT VERTEBRAL DIAS: 16 CM/S
LEFT VERTEBRAL SYS: 53 CM/S
LV SEPTAL E/E' RATIO: 27.2 M/S
MV PEAK A VEL: 0.22 M/S
MV PEAK E VEL: 1.36 M/S
MV STENOSIS PRESSURE HALF TIME: 47.3 MS
MV VALVE AREA P 1/2 METHOD: 4.65 CM2
NUC REST DIASTOLIC VOLUME INDEX: 93
NUC REST EJECTION FRACTION: 64
NUC REST SYSTOLIC VOLUME INDEX: 33
OHS CV CAROTID RIGHT ICA EDV HIGHEST: 25
OHS CV CAROTID ULTRASOUND LEFT ICA/CCA RATIO: 0.86
OHS CV CAROTID ULTRASOUND RIGHT ICA/CCA RATIO: 0.68
OHS CV CPX 1 MINUTE RECOVERY HEART RATE: 80 BPM
OHS CV CPX 85 PERCENT MAX PREDICTED HEART RATE MALE: 133
OHS CV CPX MAX PREDICTED HEART RATE: 156
OHS CV CPX PATIENT IS FEMALE: 0
OHS CV CPX PATIENT IS MALE: 1
OHS CV CPX PEAK DIASTOLIC BLOOD PRESSURE: 99 MMHG
OHS CV CPX PEAK HEAR RATE: 88 BPM
OHS CV CPX PEAK RATE PRESSURE PRODUCT: NORMAL
OHS CV CPX PEAK SYSTOLIC BLOOD PRESSURE: 155 MMHG
OHS CV CPX PERCENT MAX PREDICTED HEART RATE ACHIEVED: 56
OHS CV CPX RATE PRESSURE PRODUCT PRESENTING: NORMAL
OHS CV PV CAROTID LEFT HIGHEST CCA: 94
OHS CV PV CAROTID LEFT HIGHEST ICA: 81
OHS CV PV CAROTID RIGHT HIGHEST CCA: 101
OHS CV PV CAROTID RIGHT HIGHEST ICA: 69
OHS CV US CAROTID LEFT HIGHEST EDV: 45
PISA MRMAX VEL: 0.06 M/S
PISA TR MAX VEL: 2.57 M/S
PULM VEIN S/D RATIO: 0.48
PV PEAK D VEL: 0.79 M/S
PV PEAK S VEL: 0.38 M/S
RA MAJOR: 5.8 CM
RA PRESSURE: 3 MMHG
RA WIDTH: 3.55 CM
RIGHT ARM DIASTOLIC BLOOD PRESSURE: 88 MMHG
RIGHT ARM SYSTOLIC BLOOD PRESSURE: 128 MMHG
RIGHT CBA DIAS: 15 CM/S
RIGHT CBA SYS: 63 CM/S
RIGHT CCA DIST DIAS: 16 CM/S
RIGHT CCA DIST SYS: 76 CM/S
RIGHT CCA MID DIAS: 26 CM/S
RIGHT CCA MID SYS: 74 CM/S
RIGHT CCA PROX DIAS: 30 CM/S
RIGHT CCA PROX SYS: 101 CM/S
RIGHT ECA DIAS: 14 CM/S
RIGHT ECA SYS: 77 CM/S
RIGHT ICA DIST DIAS: 25 CM/S
RIGHT ICA DIST SYS: 56 CM/S
RIGHT ICA MID DIAS: 23 CM/S
RIGHT ICA MID SYS: 56 CM/S
RIGHT ICA PROX DIAS: 25 CM/S
RIGHT ICA PROX SYS: 69 CM/S
RIGHT VENTRICULAR END-DIASTOLIC DIMENSION: 3.17 CM
RIGHT VERTEBRAL DIAS: 15 CM/S
RIGHT VERTEBRAL SYS: 42 CM/S
SINUS: 2.74 CM
STJ: 2.35 CM
STRESS ECHO TARGET HR: 133 BPM
SYSTOLIC BLOOD PRESSURE: 155 MMHG
TDI SEPTAL: 0.05 M/S
TR MAX PG: 26 MMHG
TRICUSPID ANNULAR PLANE SYSTOLIC EXCURSION: 1.85 CM
TV REST PULMONARY ARTERY PRESSURE: 29 MMHG

## 2020-06-16 PROCEDURE — 99211 OFF/OP EST MAY X REQ PHY/QHP: CPT | Mod: PBBFAC,25,PO

## 2020-06-16 PROCEDURE — 99999 PR PBB SHADOW E&M-EST. PATIENT-LVL I: CPT | Mod: PBBFAC,,,

## 2020-06-16 PROCEDURE — 93018 PR CARDIAC STRESS TST,INTERP/REPT ONLY: ICD-10-PCS | Mod: ,,, | Performed by: INTERNAL MEDICINE

## 2020-06-16 PROCEDURE — 99999 PR PBB SHADOW E&M-EST. PATIENT-LVL II: CPT | Mod: PBBFAC,,,

## 2020-06-16 PROCEDURE — 93978 CV US ABDOMINAL AORTA EVALUATION (CUPID ONLY): ICD-10-PCS | Mod: 26,S$PBB,, | Performed by: INTERNAL MEDICINE

## 2020-06-16 PROCEDURE — 93018 CV STRESS TEST I&R ONLY: CPT | Mod: ,,, | Performed by: INTERNAL MEDICINE

## 2020-06-16 PROCEDURE — 99212 OFFICE O/P EST SF 10 MIN: CPT | Mod: PBBFAC,25,27,PO

## 2020-06-16 PROCEDURE — 93016 STRESS TEST WITH MYOCARDIAL PERFUSION (CUPID ONLY): ICD-10-PCS | Mod: ,,, | Performed by: INTERNAL MEDICINE

## 2020-06-16 PROCEDURE — 93016 CV STRESS TEST SUPVJ ONLY: CPT | Mod: ,,, | Performed by: INTERNAL MEDICINE

## 2020-06-16 PROCEDURE — 93880 EXTRACRANIAL BILAT STUDY: CPT | Mod: PBBFAC,PO | Performed by: INTERNAL MEDICINE

## 2020-06-16 PROCEDURE — 78452 STRESS TEST WITH MYOCARDIAL PERFUSION (CUPID ONLY): ICD-10-PCS | Mod: 26,,, | Performed by: INTERNAL MEDICINE

## 2020-06-16 PROCEDURE — 78452 HT MUSCLE IMAGE SPECT MULT: CPT | Mod: 26,,, | Performed by: INTERNAL MEDICINE

## 2020-06-16 PROCEDURE — 99999 PR PBB SHADOW E&M-EST. PATIENT-LVL II: ICD-10-PCS | Mod: PBBFAC,,,

## 2020-06-16 PROCEDURE — 93978 VASCULAR STUDY: CPT | Mod: PBBFAC,PO | Performed by: INTERNAL MEDICINE

## 2020-06-16 PROCEDURE — 99999 PR PBB SHADOW E&M-EST. PATIENT-LVL I: ICD-10-PCS | Mod: PBBFAC,,,

## 2020-06-16 PROCEDURE — 93880 CV US DOPPLER CAROTID (CUPID ONLY): ICD-10-PCS | Mod: 26,S$PBB,, | Performed by: INTERNAL MEDICINE

## 2020-06-22 ENCOUNTER — TELEPHONE (OUTPATIENT)
Dept: CARDIOLOGY | Facility: CLINIC | Age: 65
End: 2020-06-22

## 2020-06-22 NOTE — TELEPHONE ENCOUNTER
----- Message from Mirlande Rice sent at 6/22/2020 12:57 PM CDT -----  Regarding: Returning call  Type:  Patient Returning Call    Who Called:  Patient  Who Left Message for Patient:  Rashmi  Does the patient know what this is regarding?: appointment  Best Call Back Number:  [phone]  Additional Information:

## 2020-06-26 ENCOUNTER — OFFICE VISIT (OUTPATIENT)
Dept: CARDIOLOGY | Facility: CLINIC | Age: 65
End: 2020-06-26
Payer: OTHER GOVERNMENT

## 2020-06-26 ENCOUNTER — LAB VISIT (OUTPATIENT)
Dept: LAB | Facility: HOSPITAL | Age: 65
End: 2020-06-26
Attending: FAMILY MEDICINE
Payer: OTHER GOVERNMENT

## 2020-06-26 VITALS
DIASTOLIC BLOOD PRESSURE: 94 MMHG | BODY MASS INDEX: 30.19 KG/M2 | HEART RATE: 78 BPM | HEIGHT: 65 IN | WEIGHT: 181.19 LBS | SYSTOLIC BLOOD PRESSURE: 148 MMHG

## 2020-06-26 DIAGNOSIS — E11.29 TYPE 2 DIABETES MELLITUS WITH MICROALBUMINURIA, WITHOUT LONG-TERM CURRENT USE OF INSULIN: ICD-10-CM

## 2020-06-26 DIAGNOSIS — Z12.5 PROSTATE CANCER SCREENING: ICD-10-CM

## 2020-06-26 DIAGNOSIS — E11.8 TYPE 2 DIABETES MELLITUS WITH COMPLICATION: ICD-10-CM

## 2020-06-26 DIAGNOSIS — N20.0 KIDNEY STONES: ICD-10-CM

## 2020-06-26 DIAGNOSIS — Z11.4 SCREENING FOR HIV WITHOUT PRESENCE OF RISK FACTORS: ICD-10-CM

## 2020-06-26 DIAGNOSIS — I48.11 LONGSTANDING PERSISTENT ATRIAL FIBRILLATION: Primary | ICD-10-CM

## 2020-06-26 DIAGNOSIS — I48.0 PAF (PAROXYSMAL ATRIAL FIBRILLATION): ICD-10-CM

## 2020-06-26 DIAGNOSIS — R80.9 TYPE 2 DIABETES MELLITUS WITH MICROALBUMINURIA, WITHOUT LONG-TERM CURRENT USE OF INSULIN: ICD-10-CM

## 2020-06-26 DIAGNOSIS — I10 ESSENTIAL HYPERTENSION: ICD-10-CM

## 2020-06-26 DIAGNOSIS — Z01.810 PREOP CARDIOVASCULAR EXAM: ICD-10-CM

## 2020-06-26 LAB
ALBUMIN SERPL BCP-MCNC: 4.1 G/DL (ref 3.5–5.2)
ALP SERPL-CCNC: 91 U/L (ref 55–135)
ALT SERPL W/O P-5'-P-CCNC: 26 U/L (ref 10–44)
ANION GAP SERPL CALC-SCNC: 8 MMOL/L (ref 8–16)
AST SERPL-CCNC: 22 U/L (ref 10–40)
BILIRUB SERPL-MCNC: 0.4 MG/DL (ref 0.1–1)
BUN SERPL-MCNC: 16 MG/DL (ref 8–23)
CALCIUM SERPL-MCNC: 9.6 MG/DL (ref 8.7–10.5)
CHLORIDE SERPL-SCNC: 107 MMOL/L (ref 95–110)
CHOLEST SERPL-MCNC: 123 MG/DL (ref 120–199)
CHOLEST/HDLC SERPL: 3.2 {RATIO} (ref 2–5)
CO2 SERPL-SCNC: 25 MMOL/L (ref 23–29)
COMPLEXED PSA SERPL-MCNC: 6.2 NG/ML (ref 0–4)
CREAT SERPL-MCNC: 0.8 MG/DL (ref 0.5–1.4)
EST. GFR  (AFRICAN AMERICAN): >60 ML/MIN/1.73 M^2
EST. GFR  (NON AFRICAN AMERICAN): >60 ML/MIN/1.73 M^2
ESTIMATED AVG GLUCOSE: 128 MG/DL (ref 68–131)
GLUCOSE SERPL-MCNC: 111 MG/DL (ref 70–110)
HBA1C MFR BLD HPLC: 6.1 % (ref 4–5.6)
HDLC SERPL-MCNC: 38 MG/DL (ref 40–75)
HDLC SERPL: 30.9 % (ref 20–50)
HIV 1+2 AB+HIV1 P24 AG SERPL QL IA: NEGATIVE
LDLC SERPL CALC-MCNC: 55.6 MG/DL (ref 63–159)
NONHDLC SERPL-MCNC: 85 MG/DL
POTASSIUM SERPL-SCNC: 5 MMOL/L (ref 3.5–5.1)
PROT SERPL-MCNC: 7.4 G/DL (ref 6–8.4)
SODIUM SERPL-SCNC: 140 MMOL/L (ref 136–145)
T4 FREE SERPL-MCNC: 0.88 NG/DL (ref 0.71–1.51)
TRIGL SERPL-MCNC: 147 MG/DL (ref 30–150)
TSH SERPL DL<=0.005 MIU/L-ACNC: 2.75 UIU/ML (ref 0.4–4)

## 2020-06-26 PROCEDURE — 99999 PR PBB SHADOW E&M-EST. PATIENT-LVL III: CPT | Mod: PBBFAC,,, | Performed by: INTERNAL MEDICINE

## 2020-06-26 PROCEDURE — 99214 OFFICE O/P EST MOD 30 MIN: CPT | Mod: S$PBB,,, | Performed by: INTERNAL MEDICINE

## 2020-06-26 PROCEDURE — 80061 LIPID PANEL: CPT

## 2020-06-26 PROCEDURE — 99999 PR PBB SHADOW E&M-EST. PATIENT-LVL III: ICD-10-PCS | Mod: PBBFAC,,, | Performed by: INTERNAL MEDICINE

## 2020-06-26 PROCEDURE — 84153 ASSAY OF PSA TOTAL: CPT

## 2020-06-26 PROCEDURE — 84439 ASSAY OF FREE THYROXINE: CPT

## 2020-06-26 PROCEDURE — 84443 ASSAY THYROID STIM HORMONE: CPT

## 2020-06-26 PROCEDURE — 86703 HIV-1/HIV-2 1 RESULT ANTBDY: CPT

## 2020-06-26 PROCEDURE — 80053 COMPREHEN METABOLIC PANEL: CPT

## 2020-06-26 PROCEDURE — 99213 OFFICE O/P EST LOW 20 MIN: CPT | Mod: PBBFAC,PO | Performed by: INTERNAL MEDICINE

## 2020-06-26 PROCEDURE — 99214 PR OFFICE/OUTPT VISIT, EST, LEVL IV, 30-39 MIN: ICD-10-PCS | Mod: S$PBB,,, | Performed by: INTERNAL MEDICINE

## 2020-06-26 PROCEDURE — 83036 HEMOGLOBIN GLYCOSYLATED A1C: CPT

## 2020-06-26 PROCEDURE — 36415 COLL VENOUS BLD VENIPUNCTURE: CPT | Mod: PO

## 2020-06-26 NOTE — PROGRESS NOTES
Subjective:    Patient ID:  Trevor Johnston is a 64 y.o. male who presents for follow-up of Hypertension, Results, and Cardiac Clearance      Pt of Dr. Isabel with AF here for pre-op for lithotripsy. Since last visit Pt reports no new cardiac symptoms or problems. He does not feel the AF. He denies any palpitations, dizziness, syncope or pre-syncope. He denies rapid or sustained rhythms. He denies any chest pain, SOB, PND, orthopnea.      Review of Systems   Constitution: Negative for weight gain and weight loss.   HENT: Negative.    Eyes: Negative.    Cardiovascular: Negative for chest pain, claudication, cyanosis, dyspnea on exertion, irregular heartbeat, leg swelling, near-syncope, orthopnea (no PND), palpitations and syncope.   Respiratory: Negative for cough, hemoptysis, shortness of breath and snoring.    Endocrine: Negative.    Skin: Negative.    Musculoskeletal: Negative for joint pain, muscle cramps, muscle weakness and myalgias.   Gastrointestinal: Negative for diarrhea, hematemesis, nausea and vomiting.   Genitourinary: Negative.    Neurological: Negative for dizziness, focal weakness, light-headedness, loss of balance, numbness, paresthesias and seizures.   Psychiatric/Behavioral: Negative.         Objective:    Physical Exam   Constitutional: He is oriented to person, place, and time. He appears well-developed and well-nourished.   HENT:   Mouth/Throat: Oropharynx is clear and moist.   Eyes: Pupils are equal, round, and reactive to light.   Neck: Normal range of motion. No thyromegaly present.   Cardiovascular: Normal rate, S1 normal, S2 normal, normal heart sounds, intact distal pulses and normal pulses. An irregularly irregular rhythm present.  No extrasystoles are present. PMI is not displaced. Exam reveals no friction rub.   No murmur heard.  Pulmonary/Chest: Effort normal and breath sounds normal. He has no wheezes. He has no rales. He exhibits no tenderness.   Abdominal: Soft. Bowel sounds are  normal. He exhibits no distension and no mass. There is no abdominal tenderness.   Musculoskeletal: Normal range of motion.         General: No edema.   Neurological: He is alert and oriented to person, place, and time.   Skin: Skin is warm and dry.   Vitals reviewed.      Test(s) Reviewed  I have reviewed the following in detail:  [x] Stress test   [] Angiography   [x] Echocardiogram   [] Labs   [] Other:         Assessment:       1. Longstanding persistent atrial fibrillation    2. Essential hypertension    3. Kidney stones    4. Type 2 diabetes mellitus with microalbuminuria, without long-term current use of insulin    5. Preop cardiovascular exam         Plan:       Pt doing well  Cardiac status stable   Reviewed and discussed SPECT and Echo  No contraindication to planned lithotripsy. Continue all meds fely-op.   May hold xarelto 2-3 days as needed for procedure.  Pt has f/u with Dr. Isabel in October

## 2020-07-05 ENCOUNTER — PATIENT MESSAGE (OUTPATIENT)
Dept: FAMILY MEDICINE | Facility: CLINIC | Age: 65
End: 2020-07-05

## 2020-07-10 ENCOUNTER — LAB VISIT (OUTPATIENT)
Dept: URGENT CARE | Facility: CLINIC | Age: 65
End: 2020-07-10
Payer: OTHER GOVERNMENT

## 2020-07-10 DIAGNOSIS — Z01.818 PRE-OP TESTING: ICD-10-CM

## 2020-07-10 PROCEDURE — U0003 INFECTIOUS AGENT DETECTION BY NUCLEIC ACID (DNA OR RNA); SEVERE ACUTE RESPIRATORY SYNDROME CORONAVIRUS 2 (SARS-COV-2) (CORONAVIRUS DISEASE [COVID-19]), AMPLIFIED PROBE TECHNIQUE, MAKING USE OF HIGH THROUGHPUT TECHNOLOGIES AS DESCRIBED BY CMS-2020-01-R: HCPCS

## 2020-07-11 LAB — SARS-COV-2 RNA RESP QL NAA+PROBE: NOT DETECTED

## 2020-07-14 ENCOUNTER — TELEPHONE (OUTPATIENT)
Dept: FAMILY MEDICINE | Facility: CLINIC | Age: 65
End: 2020-07-14

## 2020-07-14 ENCOUNTER — OFFICE VISIT (OUTPATIENT)
Dept: FAMILY MEDICINE | Facility: CLINIC | Age: 65
End: 2020-07-14
Payer: OTHER GOVERNMENT

## 2020-07-14 VITALS
OXYGEN SATURATION: 98 % | BODY MASS INDEX: 28.72 KG/M2 | HEIGHT: 65 IN | RESPIRATION RATE: 16 BRPM | WEIGHT: 172.38 LBS | HEART RATE: 61 BPM | TEMPERATURE: 98 F | SYSTOLIC BLOOD PRESSURE: 116 MMHG | DIASTOLIC BLOOD PRESSURE: 84 MMHG

## 2020-07-14 DIAGNOSIS — E78.5 HYPERLIPIDEMIA, UNSPECIFIED HYPERLIPIDEMIA TYPE: ICD-10-CM

## 2020-07-14 DIAGNOSIS — N20.0 KIDNEY STONE: ICD-10-CM

## 2020-07-14 DIAGNOSIS — E11.8 DIABETES MELLITUS TYPE 2 WITH COMPLICATIONS: Primary | ICD-10-CM

## 2020-07-14 DIAGNOSIS — L65.9 HAIR LOSS: Primary | ICD-10-CM

## 2020-07-14 DIAGNOSIS — E79.0 HYPERURICEMIA: ICD-10-CM

## 2020-07-14 DIAGNOSIS — E11.59 HYPERTENSION ASSOCIATED WITH DIABETES: ICD-10-CM

## 2020-07-14 DIAGNOSIS — I15.2 HYPERTENSION ASSOCIATED WITH DIABETES: ICD-10-CM

## 2020-07-14 PROCEDURE — 99214 OFFICE O/P EST MOD 30 MIN: CPT | Mod: S$GLB,,, | Performed by: FAMILY MEDICINE

## 2020-07-14 PROCEDURE — 99214 PR OFFICE/OUTPT VISIT, EST, LEVL IV, 30-39 MIN: ICD-10-PCS | Mod: S$GLB,,, | Performed by: FAMILY MEDICINE

## 2020-07-14 RX ORDER — TIMOLOL MALEATE 5 MG/ML
1 SOLUTION/ DROPS OPHTHALMIC 2 TIMES DAILY
COMMUNITY
End: 2020-11-05 | Stop reason: CLARIF

## 2020-07-14 RX ORDER — PREDNISOLONE ACETATE 10 MG/ML
1 SUSPENSION/ DROPS OPHTHALMIC DAILY
COMMUNITY
End: 2020-11-06

## 2020-07-14 RX ORDER — METFORMIN HYDROCHLORIDE 500 MG/1
500 TABLET, EXTENDED RELEASE ORAL
COMMUNITY
End: 2020-09-28 | Stop reason: SDUPTHER

## 2020-07-14 NOTE — PATIENT INSTRUCTIONS
Your provider has requested that you make an appointment with Dermatology, to  schedule an appointment, please contact one of the following providers:    Dr. Rian Bashir & Dr. Samantha Bashir MD  [address] [place]  [address]  Phone:  [phone]    Dr. Adilene Rios & Dr. Roslyn Blank   [address] [place]  [address]  Phone: [phone]    Dr. Rian Hummel & Patricia Guillaume, PA  [address]  [place]   [address]  Phone: [phone]    Dr. Malgorzata Galvan  [address]   [address]  Phone: [phone]    [facility]  [id number]  [place], [place] [address] [phone]    Ysabel Bhatia, MD Fabio Young MD Thomas Orgeron, MD Joel Perdomo, MD Steven Shapiro, MD Thuy Tran, MADELEINE Reynaga, FNP  Sujey Hidalgo, FNP  Vikram Traore III, FNP  Torin Baptiste, FNP  Maru Mir, FNP  Fabiola Mckeon, FNP  Lisa Larkin, FNP  Viridiana Prasad, FNP  Ana Flannery, FNP

## 2020-07-14 NOTE — TELEPHONE ENCOUNTER
Pt is requesting a referral to dermatology. He said he spoke with you about this during the office visit. Referral has been pended.

## 2020-07-14 NOTE — PROGRESS NOTES
Subjective:       Patient ID: Trevor Johnston is a 64 y.o. male.    Chief Complaint: Follow-up    HPI   The patient is a 64-year-old who is here today for short-term follow-up.  Overall, he is doing well.  He is going to be having surgery tomorrow with his urologist to address his kidney stone.      Today we discussed followin)  Diabetes.  His recent A1c was 6.1 down from his prior value of 6.7.  He is taking his Glucophage consistently.  He has had no high or low readings.  Of note, since her last visit, he has been able to lose 10 lb.  He will have an eye exam soon with his usual eye provider  2) hyperlipidemia.  He is doing all the Lipitor.  His recent total cholesterol was 123 and his LDL was 55  3)  Hypertension.  Today his blood pressure is 116/84.  He is taking his Avapro and Toprol which he is tolerating well  4) PAF.  He denies any palpitations, dizziness, lightheadedness, presyncope/syncope.  He is taking Toprol and Xarelto (although Xarelto is on hold for his procedure tomorrow)   5)  Elevated PSA.  We did discuss his recent elevated PSA value of 6.2.  He is going to address this further with his  urologist    Of note, the summer he did a normal stress test with his cardiologist    Review of Systems   Constitutional: Negative for appetite change, chills, diaphoresis, fatigue, fever and unexpected weight change.   HENT: Negative for congestion, ear pain, postnasal drip, rhinorrhea, sinus pressure, sneezing, sore throat and trouble swallowing.    Eyes: Negative for pain, discharge and visual disturbance.   Respiratory: Negative for cough, chest tightness, shortness of breath and wheezing.    Cardiovascular: Negative for chest pain, palpitations and leg swelling.   Gastrointestinal: Negative for abdominal distention, abdominal pain, blood in stool, constipation, diarrhea, nausea and vomiting.   Skin: Negative for rash.       Objective:      Physical Exam  Constitutional:       General: He is not in  acute distress.     Appearance: Normal appearance. He is well-developed.   HENT:      Head: Normocephalic and atraumatic.      Right Ear: Hearing, tympanic membrane, ear canal and external ear normal.      Left Ear: Hearing, tympanic membrane, ear canal and external ear normal.      Nose: Nose normal.      Mouth/Throat:      Mouth: No oral lesions.      Pharynx: No oropharyngeal exudate or posterior oropharyngeal erythema.   Eyes:      General: Lids are normal. No scleral icterus.     Extraocular Movements: Extraocular movements intact.      Conjunctiva/sclera: Conjunctivae normal.      Pupils: Pupils are equal, round, and reactive to light.   Neck:      Musculoskeletal: Normal range of motion and neck supple.      Thyroid: No thyroid mass or thyromegaly.      Vascular: No carotid bruit.   Cardiovascular:      Rate and Rhythm: Normal rate. Rhythm irregularly irregular.  No extrasystoles are present.     Chest Wall: PMI is not displaced.      Heart sounds: Normal heart sounds. No murmur. No gallop.    Pulmonary:      Effort: Pulmonary effort is normal. No accessory muscle usage or respiratory distress.      Breath sounds: Normal breath sounds.   Abdominal:      General: Bowel sounds are normal. There is no abdominal bruit.      Palpations: Abdomen is soft.      Tenderness: There is no abdominal tenderness. There is no rebound.   Lymphadenopathy:      Head:      Right side of head: No submental or submandibular adenopathy.      Left side of head: No submental or submandibular adenopathy.      Cervical:      Right cervical: No superficial, deep or posterior cervical adenopathy.     Left cervical: No superficial, deep or posterior cervical adenopathy.      Upper Body:      Right upper body: No supraclavicular adenopathy.      Left upper body: No supraclavicular adenopathy.   Skin:     General: Skin is warm and dry.   Neurological:      Mental Status: He is alert and oriented to person, place, and time.       Blood  "pressure 116/84, pulse 61, temperature 97.9 °F (36.6 °C), temperature source Temporal, resp. rate 16, height 5' 5" (1.651 m), weight 78.2 kg (172 lb 6.4 oz), SpO2 98 %.Body mass index is 28.69 kg/m².            A/P:  1) diabetes.  Well controlled.  Continue current medication.  We will check an A1c and urine microalbumin in 6 months.  He will schedule his eye exam and have his provider send me a note of this visit   2)  Hyperlipidemia.  Well controlled.  Continue with Lipitor.  We will check labs again in 1 year   3)   Hypertension.  Well controlled.  Continue Lipitor  4)  PAF.  Asymptomatic.  Continue current medications.  Follow up with Cardiology as planned   5)  Elevated PSA.  New.  He will follow-up with his urologist to evaluate this further.  He was given a copy of his PSA value to bring to his urologist and we will fax a copy to the urologist office as well   6)  History of gout and hyperuricemia.  Asymptomatic.  We will check a uric acid level with his next set of labs    As long as he does well, I will see him back in 6 months with labs prior to that visit    "

## 2020-07-15 ENCOUNTER — TELEPHONE (OUTPATIENT)
Dept: CARDIOLOGY | Facility: CLINIC | Age: 65
End: 2020-07-15

## 2020-07-15 PROBLEM — N20.0 CALCULUS OF KIDNEY: Status: ACTIVE | Noted: 2020-07-15

## 2020-07-15 NOTE — TELEPHONE ENCOUNTER
----- Message from Ashly Kurtz sent at 7/15/2020 10:47 AM CDT -----  Regarding: medication questions  Pt needing a call regarding questions the pt have for the Dr about how long should he stay off the medication rivaroxaban (XARELTO) 20 mg Tab    Pt contact #[phone]

## 2020-07-15 NOTE — H&P (VIEW-ONLY)
[facility]  Urology  History & Physical    Patient Name: Trevor Johnston  MRN: [id number]  Admission Date: 7/15/2020  Code Status: Prior   Attending Provider: Sb Connelly MD   Primary Care Physician: Blanca Doe MD  Principal Problem:<principal problem not specified>    Subjective:     HPI: 63 yo M with left renal stone.  Off antcoagulant x 5 days.  Plan on left eswl.     Past Medical History:   Diagnosis Date    Anticoagulant long-term use     Atrial fibrillation     Diabetes mellitus, type 2     Fatty liver disease, nonalcoholic     noted on CT    Hyperlipidemia     Hypertension     Kidney stones     multiple episodes - on allopurinol    Macular degeneration, right eye     ROBERT (obstructive sleep apnea)     does not use CPAP    RLS (restless legs syndrome)        Past Surgical History:   Procedure Laterality Date    CATARACT EXTRACTION W/  INTRAOCULAR LENS IMPLANT Right 02/2017    CYSTOURETEROSCOPY WITH RETROGRADE PYELOGRAPHY AND INSERTION OF STENT INTO URETER Left 3/5/2020    Procedure: CYSTOURETEROSCOPY, WITH RETROGRADE PYELOGRAM AND URETERAL STENT EXCHANGE, flexible ureterscope;  Surgeon: Sb Connelly MD;  Location: [facility] OR;  Service: Urology;  Laterality: Left;    LASER LITHOTRIPSY Left 3/5/2020    Procedure: LITHOTRIPSY, USING LASER;  Surgeon: Sb Connelly MD;  Location: [facility] OR;  Service: Urology;  Laterality: Left;    ROTATOR CUFF REPAIR Right     TRIGGER FINGER RELEASE Bilateral     VASECTOMY         Review of patient's allergies indicates:  No Known Allergies    Family History     Problem Relation (Age of Onset)    Cancer Maternal Grandfather    Diabetes Mother, Father    Throat cancer Maternal Grandfather          Tobacco Use    Smoking status: Former Smoker     Years: 45.00     Quit date: 01/2017     Years since quitting: 3.5    Smokeless tobacco: Never Used    Tobacco comment: 45 year, about 1PPW, quit 1/2017   Substance and Sexual Activity     Alcohol use: Yes     Alcohol/week: 1.0 standard drinks     Types: 1 Glasses of wine per week     Frequency: Monthly or less     Drinks per session: 1 or 2     Binge frequency: Never    Drug use: No    Sexual activity: Yes     Partners: Female     Birth control/protection: See Surgical Hx       Review of Systems   Constitutional: Negative for fatigue and fever.   HENT: Negative for nosebleeds.    Respiratory: Negative for cough and shortness of breath.    Cardiovascular: Negative for chest pain and leg swelling.   Gastrointestinal: Negative for abdominal distention, abdominal pain and nausea.   Genitourinary: Negative for difficulty urinating, flank pain, hematuria and urgency.   Musculoskeletal: Negative for back pain.   Skin: Negative for color change.   Neurological: Negative for weakness.   Hematological: Negative for adenopathy. Does not bruise/bleed easily.   Psychiatric/Behavioral: Negative for confusion.       Objective:     Temp:  [96 °F (35.6 °C)-97.9 °F (36.6 °C)] 96 °F (35.6 °C)  Pulse:  [61-94] 94  Resp:  [16-22] 22  SpO2:  [96 %-98 %] 96 %  BP: (116-142)/(66-84) 142/66     Body mass index is 29.39 kg/m².    No intake/output data recorded.       Lines/Drains/Airways     Peripheral Intravenous Line                 Peripheral IV - Single Lumen 07/15/20 0636 20 G Left Wrist less than 1 day                Physical Exam   Nursing note and vitals reviewed.  HENT:   Head: Normocephalic and atraumatic.   Cardiovascular: Normal rate.    Pulmonary/Chest: Effort normal.   Abdominal: He exhibits no distension and no mass.   Neurological: He is alert.       Significant Labs:  BMP:  No results for input(s): NA, K, CL, CO2, BUN, CREATININE, LABGLOM, GLUCOSE, CALCIUM in the last 168 hours.    CBC:  No results for input(s): WBC, HGB, HCT, PLT in the last 168 hours.    All pertinent labs results from the past 24 hours have been reviewed.    Significant Imaging:  All pertinent imaging results/findings from the past 24  hours have been reviewed.    Assessment and Plan:     Active Diagnoses:    Diagnosis Date Noted POA    Calculus of kidney [N20.0] 07/15/2020 Yes      Problems Resolved During this Admission:       VTE Risk Mitigation (From admission, onward)         Ordered     Place sequential compression device  Until discontinued      07/15/20 0606                Sb Connelly MD  Urology  [facility]

## 2020-07-16 ENCOUNTER — TELEPHONE (OUTPATIENT)
Dept: CARDIOLOGY | Facility: CLINIC | Age: 65
End: 2020-07-16

## 2020-07-16 NOTE — TELEPHONE ENCOUNTER
----- Message from Carisa Larios sent at 7/16/2020  8:24 AM CDT -----  Regarding: advice  Contact: self  Type: Needs Medical Advice  Who Called: self   Symptoms (please be specific):   How long has patient had these symptoms:   Pharmacy name and phone #:   Best Call Back Number: [phone]  Additional Information: Patient asking when should he resume taking rx rivaroxaban (XARELTO) 20 mg Tab.

## 2020-07-16 NOTE — TELEPHONE ENCOUNTER
Spoke to pt and advised to restart Xaralto 24-48 hours after procedure; pt expressed understanding

## 2020-07-30 ENCOUNTER — TELEPHONE (OUTPATIENT)
Dept: FAMILY MEDICINE | Facility: CLINIC | Age: 65
End: 2020-07-30

## 2020-07-30 DIAGNOSIS — Z01.00 EYE EXAM, ROUTINE: Primary | ICD-10-CM

## 2020-07-30 NOTE — TELEPHONE ENCOUNTER
----- Message from Yousuf Casey sent at 7/30/2020  2:41 PM CDT -----  Regarding: pt  Type:  Patient Requesting Referral    Who Called:  pt  Does the patient already have the specialty appointment scheduled?:  no  If yes, what is the date of that appointment?:  n/a  Referral to What Specialty:  [facility]  Reason for Referral:  Dr Asked for Referral  Does the patient want the referral with a specific physician?:  YES  [facility] the [facility]?:  [facility] Dr [facility] Meng  Patient Requesting a Call Back?:  yes  Best Call Back Number:  [phone]   Additional Information:   pt would like call after referral sent. Pt has been seeing dr for many years and  will not schedule another appt until gets new referral from PCP. Pt would like to know when referral sent so can schedule next appt.

## 2020-08-06 NOTE — TELEPHONE ENCOUNTER
----- Message from Karina Noguera sent at 8/6/2020  1:17 PM CDT -----  Contact: call  [phone]   Type:  RX Refill Request    Who Called: pt  Refill RX Name and Strength:  metoprolol  er  25  mg  How is the patient currently taking it? (ex. 1XDay): 1 a day  Is this a 30 day or 90 day RX:  90 day  Preferred Pharmacy with phone number:   [facility] - [address] [address]  Phone: [phone] Fax: [phone]  Best Call Back Number: [phone]  Additional Information:    please call  for details

## 2020-08-07 ENCOUNTER — TELEPHONE (OUTPATIENT)
Dept: FAMILY MEDICINE | Facility: CLINIC | Age: 65
End: 2020-08-07

## 2020-08-07 RX ORDER — METOPROLOL SUCCINATE 25 MG/1
25 TABLET, EXTENDED RELEASE ORAL NIGHTLY
Qty: 90 TABLET | Refills: 3 | Status: SHIPPED | OUTPATIENT
Start: 2020-08-07 | End: 2021-06-21

## 2020-08-07 NOTE — TELEPHONE ENCOUNTER
A referral was placed for Dr. Meng on 7/30/2020.  Authorization is still pending.  Can you please advise if something further is needed from our office?  Thank you

## 2020-08-07 NOTE — TELEPHONE ENCOUNTER
----- Message from Carisa Larios sent at 8/7/2020 11:22 AM CDT -----  Regarding: .advice  Contact: self  Type: Needs Medical Advice  Who Called:  selfSymptoms (please be specific):    How long has patient had these symptoms:    Pharmacy name and phone #:    Best Call Back Number: [phone]  Additional Information: Patient states the eye doctor has not received [facility] referral from the doctor's office.

## 2020-08-10 ENCOUNTER — TELEPHONE (OUTPATIENT)
Dept: FAMILY MEDICINE | Facility: CLINIC | Age: 65
End: 2020-08-10

## 2020-08-10 ENCOUNTER — LAB VISIT (OUTPATIENT)
Dept: FAMILY MEDICINE | Facility: CLINIC | Age: 65
End: 2020-08-10
Payer: OTHER GOVERNMENT

## 2020-08-10 DIAGNOSIS — Z01.818 PRE-OP TESTING: ICD-10-CM

## 2020-08-10 DIAGNOSIS — L65.9 HAIR LOSS: Primary | ICD-10-CM

## 2020-08-10 PROCEDURE — U0003 INFECTIOUS AGENT DETECTION BY NUCLEIC ACID (DNA OR RNA); SEVERE ACUTE RESPIRATORY SYNDROME CORONAVIRUS 2 (SARS-COV-2) (CORONAVIRUS DISEASE [COVID-19]), AMPLIFIED PROBE TECHNIQUE, MAKING USE OF HIGH THROUGHPUT TECHNOLOGIES AS DESCRIBED BY CMS-2020-01-R: HCPCS

## 2020-08-10 NOTE — TELEPHONE ENCOUNTER
----- Message from Mirlande Pineda sent at 8/10/2020  1:32 PM CDT -----  Contact: self  Type:  Patient Requesting Referral    Who Called:  Patient   Does the patient already have the specialty appointment scheduled?:yes previously seen  If yes, what is the date of that appointment?:    Referral to What Specialty:  Dermatology  Reason for Referral:  Alopecia  Does the patient want the referral with a specific physician?:  yes  Is the [facility]?:  non   Dr. Flores  Patient Requesting a Call Back?:  yes  Best Call Back Number:  [phone]  Additional Information:   needs referral sent to [facility]

## 2020-08-11 ENCOUNTER — TELEPHONE (OUTPATIENT)
Dept: FAMILY MEDICINE | Facility: CLINIC | Age: 65
End: 2020-08-11

## 2020-08-11 ENCOUNTER — ANESTHESIA EVENT (OUTPATIENT)
Dept: SURGERY | Facility: HOSPITAL | Age: 65
End: 2020-08-11
Payer: OTHER GOVERNMENT

## 2020-08-11 LAB — SARS-COV-2 RNA RESP QL NAA+PROBE: NOT DETECTED

## 2020-08-12 ENCOUNTER — ANESTHESIA (OUTPATIENT)
Dept: SURGERY | Facility: HOSPITAL | Age: 65
End: 2020-08-12
Payer: OTHER GOVERNMENT

## 2020-08-12 ENCOUNTER — HOSPITAL ENCOUNTER (OUTPATIENT)
Facility: HOSPITAL | Age: 65
Discharge: HOME | End: 2020-08-12
Attending: UROLOGY | Admitting: UROLOGY
Payer: OTHER GOVERNMENT

## 2020-08-12 VITALS
SYSTOLIC BLOOD PRESSURE: 124 MMHG | RESPIRATION RATE: 16 BRPM | TEMPERATURE: 98 F | DIASTOLIC BLOOD PRESSURE: 80 MMHG | OXYGEN SATURATION: 98 % | WEIGHT: 172 LBS | HEART RATE: 74 BPM | HEIGHT: 65 IN | BODY MASS INDEX: 28.66 KG/M2

## 2020-08-12 DIAGNOSIS — N20.0 KIDNEY STONES: ICD-10-CM

## 2020-08-12 LAB — GLUCOSE SERPL-MCNC: 116 MG/DL (ref 70–110)

## 2020-08-12 PROCEDURE — 71000015 HC POSTOP RECOV 1ST HR: Mod: PO | Performed by: UROLOGY

## 2020-08-12 PROCEDURE — 36000705 HC OR TIME LEV I EA ADD 15 MIN: Mod: PO | Performed by: UROLOGY

## 2020-08-12 PROCEDURE — 25000003 PHARM REV CODE 250: Mod: PO | Performed by: NURSE ANESTHETIST, CERTIFIED REGISTERED

## 2020-08-12 PROCEDURE — D9220A PRA ANESTHESIA: Mod: ANES,,, | Performed by: ANESTHESIOLOGY

## 2020-08-12 PROCEDURE — D9220A PRA ANESTHESIA: Mod: CRNA,,, | Performed by: NURSE ANESTHETIST, CERTIFIED REGISTERED

## 2020-08-12 PROCEDURE — 63600175 PHARM REV CODE 636 W HCPCS: Mod: PO | Performed by: ANESTHESIOLOGY

## 2020-08-12 PROCEDURE — 00873 ANES LITHOTRP ESW WO WTR BTH: CPT | Mod: PO | Performed by: UROLOGY

## 2020-08-12 PROCEDURE — D9220A PRA ANESTHESIA: ICD-10-PCS | Mod: CRNA,,, | Performed by: NURSE ANESTHETIST, CERTIFIED REGISTERED

## 2020-08-12 PROCEDURE — 71000033 HC RECOVERY, INTIAL HOUR: Mod: PO | Performed by: UROLOGY

## 2020-08-12 PROCEDURE — 82962 GLUCOSE BLOOD TEST: CPT | Mod: PO | Performed by: UROLOGY

## 2020-08-12 PROCEDURE — 63600175 PHARM REV CODE 636 W HCPCS: Mod: PO | Performed by: NURSE ANESTHETIST, CERTIFIED REGISTERED

## 2020-08-12 PROCEDURE — 37000009 HC ANESTHESIA EA ADD 15 MINS: Mod: PO | Performed by: UROLOGY

## 2020-08-12 PROCEDURE — D9220A PRA ANESTHESIA: ICD-10-PCS | Mod: ANES,,, | Performed by: ANESTHESIOLOGY

## 2020-08-12 PROCEDURE — 36000704 HC OR TIME LEV I 1ST 15 MIN: Mod: PO | Performed by: UROLOGY

## 2020-08-12 PROCEDURE — 37000008 HC ANESTHESIA 1ST 15 MINUTES: Mod: PO | Performed by: UROLOGY

## 2020-08-12 RX ORDER — ACETAMINOPHEN 10 MG/ML
INJECTION, SOLUTION INTRAVENOUS
Status: DISCONTINUED | OUTPATIENT
Start: 2020-08-12 | End: 2020-08-12

## 2020-08-12 RX ORDER — OXYCODONE HYDROCHLORIDE 5 MG/1
5 TABLET ORAL
Status: DISCONTINUED | OUTPATIENT
Start: 2020-08-12 | End: 2020-08-12 | Stop reason: HOSPADM

## 2020-08-12 RX ORDER — ONDANSETRON 2 MG/ML
INJECTION INTRAMUSCULAR; INTRAVENOUS
Status: DISCONTINUED | OUTPATIENT
Start: 2020-08-12 | End: 2020-08-12

## 2020-08-12 RX ORDER — MIDAZOLAM HYDROCHLORIDE 1 MG/ML
INJECTION, SOLUTION INTRAMUSCULAR; INTRAVENOUS
Status: DISCONTINUED | OUTPATIENT
Start: 2020-08-12 | End: 2020-08-12

## 2020-08-12 RX ORDER — PROPOFOL 10 MG/ML
INJECTION, EMULSION INTRAVENOUS
Status: DISCONTINUED | OUTPATIENT
Start: 2020-08-12 | End: 2020-08-12

## 2020-08-12 RX ORDER — KETOROLAC TROMETHAMINE 30 MG/ML
INJECTION, SOLUTION INTRAMUSCULAR; INTRAVENOUS
Status: DISCONTINUED | OUTPATIENT
Start: 2020-08-12 | End: 2020-08-12

## 2020-08-12 RX ORDER — CEFAZOLIN SODIUM 1 G/3ML
INJECTION, POWDER, FOR SOLUTION INTRAMUSCULAR; INTRAVENOUS
Status: DISCONTINUED | OUTPATIENT
Start: 2020-08-12 | End: 2020-08-12

## 2020-08-12 RX ORDER — FENTANYL CITRATE 50 UG/ML
INJECTION, SOLUTION INTRAMUSCULAR; INTRAVENOUS
Status: DISCONTINUED | OUTPATIENT
Start: 2020-08-12 | End: 2020-08-12

## 2020-08-12 RX ORDER — HYDROCODONE BITARTRATE AND ACETAMINOPHEN 5; 325 MG/1; MG/1
1 TABLET ORAL EVERY 4 HOURS PRN
Status: DISCONTINUED | OUTPATIENT
Start: 2020-08-12 | End: 2020-08-12 | Stop reason: HOSPADM

## 2020-08-12 RX ORDER — FENTANYL CITRATE 50 UG/ML
25 INJECTION, SOLUTION INTRAMUSCULAR; INTRAVENOUS EVERY 5 MIN PRN
Status: DISCONTINUED | OUTPATIENT
Start: 2020-08-12 | End: 2020-08-12 | Stop reason: HOSPADM

## 2020-08-12 RX ORDER — DEXAMETHASONE SODIUM PHOSPHATE 4 MG/ML
8 INJECTION, SOLUTION INTRA-ARTICULAR; INTRALESIONAL; INTRAMUSCULAR; INTRAVENOUS; SOFT TISSUE
Status: COMPLETED | OUTPATIENT
Start: 2020-08-12 | End: 2020-08-12

## 2020-08-12 RX ORDER — SODIUM CHLORIDE, SODIUM LACTATE, POTASSIUM CHLORIDE, CALCIUM CHLORIDE 600; 310; 30; 20 MG/100ML; MG/100ML; MG/100ML; MG/100ML
INJECTION, SOLUTION INTRAVENOUS CONTINUOUS
Status: DISPENSED | OUTPATIENT
Start: 2020-08-12

## 2020-08-12 RX ORDER — HYDROMORPHONE HYDROCHLORIDE 2 MG/ML
0.2 INJECTION, SOLUTION INTRAMUSCULAR; INTRAVENOUS; SUBCUTANEOUS EVERY 5 MIN PRN
Status: DISCONTINUED | OUTPATIENT
Start: 2020-08-12 | End: 2020-08-12 | Stop reason: HOSPADM

## 2020-08-12 RX ORDER — LIDOCAINE HCL/PF 100 MG/5ML
SYRINGE (ML) INTRAVENOUS
Status: DISCONTINUED | OUTPATIENT
Start: 2020-08-12 | End: 2020-08-12

## 2020-08-12 RX ORDER — PROPOFOL 10 MG/ML
INJECTION, EMULSION INTRAVENOUS CONTINUOUS PRN
Status: DISCONTINUED | OUTPATIENT
Start: 2020-08-12 | End: 2020-08-12

## 2020-08-12 RX ORDER — LIDOCAINE HYDROCHLORIDE 10 MG/ML
1 INJECTION, SOLUTION EPIDURAL; INFILTRATION; INTRACAUDAL; PERINEURAL ONCE
Status: ACTIVE | OUTPATIENT
Start: 2020-08-12

## 2020-08-12 RX ADMIN — LIDOCAINE HYDROCHLORIDE 100 MG: 20 INJECTION PARENTERAL at 12:08

## 2020-08-12 RX ADMIN — PROPOFOL 25 MG: 10 INJECTION, EMULSION INTRAVENOUS at 12:08

## 2020-08-12 RX ADMIN — SODIUM CHLORIDE, SODIUM LACTATE, POTASSIUM CHLORIDE, AND CALCIUM CHLORIDE: .6; .31; .03; .02 INJECTION, SOLUTION INTRAVENOUS at 12:08

## 2020-08-12 RX ADMIN — KETOROLAC TROMETHAMINE 30 MG: 30 INJECTION, SOLUTION INTRAMUSCULAR; INTRAVENOUS at 12:08

## 2020-08-12 RX ADMIN — PROPOFOL 50 MCG/KG/MIN: 10 INJECTION, EMULSION INTRAVENOUS at 12:08

## 2020-08-12 RX ADMIN — DEXAMETHASONE SODIUM PHOSPHATE 8 MG: 4 INJECTION, SOLUTION INTRAMUSCULAR; INTRAVENOUS at 11:08

## 2020-08-12 RX ADMIN — MIDAZOLAM 1 MG: 1 INJECTION INTRAMUSCULAR; INTRAVENOUS at 12:08

## 2020-08-12 RX ADMIN — CEFAZOLIN 2 G: 1 INJECTION, POWDER, FOR SOLUTION INTRAVENOUS at 12:08

## 2020-08-12 RX ADMIN — ONDANSETRON 4 MG: 2 INJECTION, SOLUTION INTRAMUSCULAR; INTRAVENOUS at 12:08

## 2020-08-12 RX ADMIN — ACETAMINOPHEN 1000 MG: 10 INJECTION, SOLUTION INTRAVENOUS at 12:08

## 2020-08-12 RX ADMIN — FENTANYL CITRATE 50 MCG: 50 INJECTION, SOLUTION INTRAMUSCULAR; INTRAVENOUS at 12:08

## 2020-08-12 RX ADMIN — SODIUM CHLORIDE, SODIUM LACTATE, POTASSIUM CHLORIDE, AND CALCIUM CHLORIDE: .6; .31; .03; .02 INJECTION, SOLUTION INTRAVENOUS at 11:08

## 2020-08-12 RX ADMIN — PROPOFOL 50 MG: 10 INJECTION, EMULSION INTRAVENOUS at 12:08

## 2020-08-12 NOTE — TRANSFER OF CARE
"Anesthesia Transfer of Care Note    Patient: Trevor Johnston    Procedure(s) Performed: Procedure(s) (LRB):  LITHOTRIPSY, ESWL (N/A)    Patient location: PACU    Anesthesia Type: MAC    Transport from OR: Transported from OR on room air with adequate spontaneous ventilation    Post pain: adequate analgesia    Post assessment: no apparent anesthetic complications and tolerated procedure well    Post vital signs: stable    Level of consciousness: awake, alert and oriented    Nausea/Vomiting: no nausea/vomiting    Complications: none    Transfer of care protocol was followed      Last vitals:   Visit Vitals  /80 (BP Location: Right arm, Patient Position: Lying)   Pulse 64   Temp 36.9 °C (98.4 °F) (Skin)   Resp 16   Ht 5' 5" (1.651 m)   Wt 78 kg (172 lb)   SpO2 98%   BMI 28.62 kg/m²     "

## 2020-08-12 NOTE — INTERVAL H&P NOTE
The patient has been examined and the H&P has been reviewed:    Plan on repeat left ESWL.     Surgery risks, benefits and alternative options discussed and understood by patient/family.          There are no hospital problems to display for this patient.

## 2020-08-12 NOTE — ANESTHESIA POSTPROCEDURE EVALUATION
Anesthesia Post Evaluation    Patient: Trevor Johnston    Procedure(s) Performed: Procedure(s) (LRB):  LITHOTRIPSY, ESWL (N/A)    Final Anesthesia Type: MAC    Patient location during evaluation: PACU  Patient participation: Yes- Able to Participate  Level of consciousness: awake and alert, oriented and awake  Post-procedure vital signs: reviewed and stable  Pain management: adequate  Airway patency: patent    PONV status at discharge: No PONV  Anesthetic complications: no      Cardiovascular status: blood pressure returned to baseline and hemodynamically stable  Respiratory status: unassisted, spontaneous ventilation and room air  Hydration status: euvolemic  Follow-up not needed.          Vitals Value Taken Time     No case tracking events are documented in the log.      Pain/Susan Score: Susan Score: 10 (8/12/2020 12:58 PM)

## 2020-08-12 NOTE — OP NOTE
[facility]  Surgery Department  Operative Note    SUMMARY     Date of Procedure: 8/12/2020     Procedure: Procedure(s) (LRB):  LITHOTRIPSY, ESWL (N/A)     Surgeon(s) and Role:     * Sb Connelly MD - Primary    Assisting Surgeon: None    Pre-Operative Diagnosis: Kidney stone [N20.0]    Post-Operative Diagnosis: Post-Op Diagnosis Codes:     * Kidney stone [N20.0]    Anesthesia: General        Description of the Findings of the Procedure: The patient was taken to the Operating Room anesthesia was administered, timeout was performed, antibiotics were given. He   was then placed in lithotripsy table.The stone was then sighted. Shocking was commenced starting at a low power level and gradually increasing to 25 kilovolts.  After 3000 shocks the stone appears to be well fragmented.         Complications: No    Estimated Blood Loss (EBL): * No values recorded between 8/12/2020 12:19 PM and 8/12/2020 12:46 PM *                       Condition: Good    Disposition: PACU - hemodynamically stable.

## 2020-08-12 NOTE — ANESTHESIA PREPROCEDURE EVALUATION
08/12/2020  Trevor Johnston is a 64 y.o., male.    Anesthesia Evaluation    I have reviewed the Patient Summary Reports.    I have reviewed the Nursing Notes. I have reviewed the NPO Status.   I have reviewed the Medications.     Review of Systems  Anesthesia Hx:  No problems with previous Anesthesia  Neg history of prior surgery. Denies Family Hx of Anesthesia complications.   Denies Personal Hx of Anesthesia complications.   Social:  Former Smoker    Hematology/Oncology:  Hematology Normal   Oncology Normal     EENT/Dental:EENT/Dental Normal   Cardiovascular:   Hypertension Dysrhythmias atrial fibrillation hyperlipidemia    Pulmonary:   Sleep Apnea, CPAP    Renal/:   renal calculi    Hepatic/GI:   Liver Disease,    Musculoskeletal:  Musculoskeletal Normal    Endocrine:   Diabetes, type 2    Dermatological:  Skin Normal    Psych:  Psychiatric Normal           Physical Exam  General:  Well nourished    Airway/Jaw/Neck:  Airway Findings: Mouth Opening: Normal Tongue: Normal  Mallampati: I  TM Distance: Normal, at least 6 cm  Jaw/Neck Findings:  Neck ROM: Normal ROM, Normal Extension, Painful  Neck Findings:      Dental:  Dental Findings: In tact   Chest/Lungs:  Chest/Lungs Findings: Clear to auscultation, Normal Respiratory Rate     Heart/Vascular:  Heart Findings: Rate: Normal  Vascular Findings:     Abdomen:  Abdomen Findings:  Normal     Musculoskeletal:  Musculoskeletal Findings:    Skin:  Skin Findings:     Mental Status:  Mental Status Findings:  Cooperative, Alert and Oriented         Anesthesia Plan  Type of Anesthesia, risks & benefits discussed:  Anesthesia Type:  general  Patient's Preference: General  Intra-op Monitoring Plan: standard ASA monitors  Intra-op Monitoring Plan Comments:   Post Op Pain Control Plan:   Post Op Pain Control Plan Comments:   Induction:   IV  Beta Blocker:  Patient is  not currently on a Beta-Blocker (No further documentation required).       Informed Consent: Patient understands risks and agrees with Anesthesia plan.  Questions answered. Anesthesia consent signed with patient.  ASA Score: 2     Day of Surgery Review of History & Physical:    H&P update referred to the surgeon.         Ready For Surgery From Anesthesia Perspective.

## 2020-08-17 ENCOUNTER — TELEPHONE (OUTPATIENT)
Dept: FAMILY MEDICINE | Facility: CLINIC | Age: 65
End: 2020-08-17

## 2020-08-17 NOTE — TELEPHONE ENCOUNTER
----- Message from Mell Steel sent at 8/17/2020  3:03 PM CDT -----  Contact: pt  Type: Needs Medical Advice    Who Called:  Pt  Best Call Back Number: [phone]  Additional Information: Requesting a call back regarding pt need s a referral sent to lemuel Flores office  Please Advise ---Thank you

## 2020-08-18 ENCOUNTER — TELEPHONE (OUTPATIENT)
Dept: FAMILY MEDICINE | Facility: CLINIC | Age: 65
End: 2020-08-18

## 2020-08-18 NOTE — TELEPHONE ENCOUNTER
Called pt and left him a voicemail explaining to him that I faxed his referral to Dr. Flores's office.

## 2020-08-18 NOTE — TELEPHONE ENCOUNTER
Patient called and voiced his frustration regarding his Optho referral.  Patient has made several attempts to get his referral authorized and his appt scheduled.  Faxed the referral to [facility] at [phone] and also faxed it to Dr. Meng's office.  Patient notified via portal.

## 2020-08-19 ENCOUNTER — PATIENT MESSAGE (OUTPATIENT)
Dept: FAMILY MEDICINE | Facility: CLINIC | Age: 65
End: 2020-08-19

## 2020-08-19 NOTE — TELEPHONE ENCOUNTER
----- Message from Aramis Land sent at 8/19/2020  8:37 AM CDT -----  Contact: Lashell  Type: Needs Medical Advice  Who Called:  Lashell with Dr.John Meng  Symptoms (please be specific):    How long has patient had these symptoms:    Pharmacy name and phone #:    Best Call Back Number:   Additional Information: requesting a call back regarding referral

## 2020-08-19 NOTE — TELEPHONE ENCOUNTER
Dr. Meng's office calling to follow up on authorization needed for appt. Please see referral dated 7/30/2020 and advise

## 2020-09-04 NOTE — BRIEF OP NOTE
[facility]  Brief Operative Note    Surgery Date: 8/12/2020     Surgeon(s) and Role:     * Sb Connelly MD - Primary    Assisting Surgeon: None    Pre-op Diagnosis:  Kidney stone [N20.0]    Post-op Diagnosis:  Post-Op Diagnosis Codes:     * Kidney stone [N20.0]    Procedure(s) (LRB):  LITHOTRIPSY, ESWL (N/A)    Anesthesia: General    Description of the findings of the procedure(s): see op note    Estimated Blood Loss: * No values recorded between 8/12/2020 12:19 PM and 8/12/2020 12:57 PM *         Specimens:   Specimen (12h ago, onward)    None            Discharge Note    OUTCOME: Patient tolerated treatment/procedure well without complication and is now ready for discharge.    DISPOSITION: Home or Self Care    FINAL DIAGNOSIS:  Kidney stones    FOLLOWUP: In clinic    DISCHARGE INSTRUCTIONS:    Discharge Procedure Orders   Diet general     Lifting restrictions     Call MD for:  temperature >100.4     Call MD for:  persistent nausea and vomiting     Call MD for:  severe uncontrolled pain

## 2020-09-28 DIAGNOSIS — E11.69 TYPE 2 DIABETES MELLITUS WITH OTHER SPECIFIED COMPLICATION, WITHOUT LONG-TERM CURRENT USE OF INSULIN: Primary | ICD-10-CM

## 2020-10-01 RX ORDER — METFORMIN HYDROCHLORIDE 500 MG/1
500 TABLET, EXTENDED RELEASE ORAL 2 TIMES DAILY
Qty: 270 TABLET | Refills: 1 | Status: SHIPPED | OUTPATIENT
Start: 2020-10-01 | End: 2020-10-02 | Stop reason: DRUGHIGH

## 2020-10-02 ENCOUNTER — DOCUMENTATION ONLY (OUTPATIENT)
Dept: FAMILY MEDICINE | Facility: CLINIC | Age: 65
End: 2020-10-02

## 2020-10-02 RX ORDER — METFORMIN HYDROCHLORIDE 500 MG/1
500 TABLET ORAL 2 TIMES DAILY WITH MEALS
COMMUNITY
End: 2021-08-12

## 2020-10-08 ENCOUNTER — PATIENT OUTREACH (OUTPATIENT)
Dept: ADMINISTRATIVE | Facility: OTHER | Age: 65
End: 2020-10-08

## 2020-10-08 ENCOUNTER — OFFICE VISIT (OUTPATIENT)
Dept: CARDIOLOGY | Facility: CLINIC | Age: 65
End: 2020-10-08
Payer: MEDICARE

## 2020-10-08 VITALS
HEART RATE: 75 BPM | SYSTOLIC BLOOD PRESSURE: 126 MMHG | WEIGHT: 178.38 LBS | HEIGHT: 65 IN | BODY MASS INDEX: 29.72 KG/M2 | DIASTOLIC BLOOD PRESSURE: 84 MMHG

## 2020-10-08 DIAGNOSIS — I15.2 HYPERTENSION ASSOCIATED WITH DIABETES: Primary | ICD-10-CM

## 2020-10-08 DIAGNOSIS — E11.59 HYPERTENSION ASSOCIATED WITH DIABETES: Primary | ICD-10-CM

## 2020-10-08 DIAGNOSIS — Z03.818 ENCNTR FOR OBS FOR SUSP EXPSR TO OTH BIOLG AGENTS RULED OUT: ICD-10-CM

## 2020-10-08 DIAGNOSIS — E11.69 TYPE 2 DIABETES MELLITUS WITH OTHER SPECIFIED COMPLICATION, WITHOUT LONG-TERM CURRENT USE OF INSULIN: ICD-10-CM

## 2020-10-08 DIAGNOSIS — G47.33 OSA (OBSTRUCTIVE SLEEP APNEA): ICD-10-CM

## 2020-10-08 DIAGNOSIS — E78.2 MIXED HYPERLIPIDEMIA: ICD-10-CM

## 2020-10-08 DIAGNOSIS — I48.11 LONGSTANDING PERSISTENT ATRIAL FIBRILLATION: ICD-10-CM

## 2020-10-08 PROCEDURE — 99214 OFFICE O/P EST MOD 30 MIN: CPT | Mod: S$PBB,,, | Performed by: INTERNAL MEDICINE

## 2020-10-08 PROCEDURE — 99213 OFFICE O/P EST LOW 20 MIN: CPT | Mod: PBBFAC,PO | Performed by: INTERNAL MEDICINE

## 2020-10-08 PROCEDURE — 99999 PR PBB SHADOW E&M-EST. PATIENT-LVL III: ICD-10-PCS | Mod: PBBFAC,,, | Performed by: INTERNAL MEDICINE

## 2020-10-08 PROCEDURE — 99999 PR PBB SHADOW E&M-EST. PATIENT-LVL III: CPT | Mod: PBBFAC,,, | Performed by: INTERNAL MEDICINE

## 2020-10-08 PROCEDURE — 99214 PR OFFICE/OUTPT VISIT, EST, LEVL IV, 30-39 MIN: ICD-10-PCS | Mod: S$PBB,,, | Performed by: INTERNAL MEDICINE

## 2020-10-08 RX ORDER — DORZOLAMIDE HYDROCHLORIDE AND TIMOLOL MALEATE 20; 5 MG/ML; MG/ML
1 SOLUTION/ DROPS OPHTHALMIC 2 TIMES DAILY
COMMUNITY

## 2020-10-08 RX ORDER — FLECAINIDE ACETATE 100 MG/1
100 TABLET ORAL EVERY 12 HOURS
Qty: 180 TABLET | Refills: 3 | Status: SHIPPED | OUTPATIENT
Start: 2020-10-08 | End: 2021-09-01

## 2020-10-08 NOTE — PROGRESS NOTES
Subjective:    Patient ID:  Trevor Johnston is a 65 y.o. male who presents for follow-up of No chief complaint on file.      HPI  Here for follow up of PAF. Has been battling kidney stones for 6 months. Doing well. Patient denies palpitations, syncope, presyncope, lightheadedness or dizziness. Does very heavy activity no angina. Patient denies palpitations, syncope, presyncope, lightheadedness or dizziness.        Review of Systems   Constitution: Negative for malaise/fatigue.   Eyes: Negative for blurred vision.   Cardiovascular: Negative for chest pain, claudication, cyanosis, dyspnea on exertion, irregular heartbeat, leg swelling, near-syncope, orthopnea, palpitations, paroxysmal nocturnal dyspnea and syncope.   Respiratory: Negative for cough and shortness of breath.    Hematologic/Lymphatic: Does not bruise/bleed easily.   Musculoskeletal: Negative for back pain, falls, joint pain, muscle cramps, muscle weakness and myalgias.   Gastrointestinal: Negative for abdominal pain, change in bowel habit, nausea and vomiting.   Genitourinary: Negative for urgency.   Neurological: Negative for dizziness, focal weakness and light-headedness.       Past Medical History:   Diagnosis Date    Anticoagulant long-term use     Atrial fibrillation     Diabetes mellitus, type 2     Fatty liver disease, nonalcoholic     noted on CT    H/O cardiovascular stress test     normal 6/20    Hyperlipidemia     Hypertension     Hyperuricemia     Kidney stones     multiple episodes - on allopurinol    Macular degeneration, right eye     ROBERT (obstructive sleep apnea)     does not use CPAP    RLS (restless legs syndrome)     Valvular heart disease     mod MR and mild TR on 6/20 echo     There are no hospital problems to display for this patient.       Objective:     Vitals:    10/08/20 1131   BP: 126/84   Pulse: 75        Physical Exam   Constitutional: He is oriented to person, place, and time. He appears well-developed and  well-nourished. He is cooperative.   HENT:   Head: Normocephalic and atraumatic.   Eyes: Conjunctivae are normal. Right eye exhibits no exudate. Left eye exhibits no exudate.   Neck: Normal range of motion. Neck supple. Normal carotid pulses and no JVD present. Carotid bruit is not present. No thyromegaly present.   Cardiovascular: Normal rate, normal heart sounds and intact distal pulses. An irregularly irregular rhythm present.   Pulses:       Carotid pulses are 2+ on the right side and 2+ on the left side.       Radial pulses are 2+ on the right side and 2+ on the left side.        Dorsalis pedis pulses are 2+ on the right side and 2+ on the left side.        Posterior tibial pulses are 2+ on the right side and 2+ on the left side.   Pulmonary/Chest: Effort normal and breath sounds normal.   Abdominal: Soft. Bowel sounds are normal.   Musculoskeletal: Normal range of motion.         General: No edema.   Neurological: He is alert and oriented to person, place, and time. Gait normal.   Skin: Skin is warm, dry and intact. No cyanosis. Nails show no clubbing.   Psychiatric: He has a normal mood and affect. His speech is normal and behavior is normal. Judgment and thought content normal.   Nursing note and vitals reviewed.            ..    Chemistry        Component Value Date/Time     06/26/2020 0906    K 5.0 06/26/2020 0906     06/26/2020 0906    CO2 25 06/26/2020 0906    BUN 16 06/26/2020 0906    CREATININE 0.8 06/26/2020 0906     (H) 06/26/2020 0906        Component Value Date/Time    CALCIUM 9.6 06/26/2020 0906    ALKPHOS 91 06/26/2020 0906    AST 22 06/26/2020 0906    ALT 26 06/26/2020 0906    BILITOT 0.4 06/26/2020 0906    ESTGFRAFRICA >60.0 06/26/2020 0906    EGFRNONAA >60.0 06/26/2020 0906            ..  Lab Results   Component Value Date    CHOL 123 06/26/2020    CHOL 135 09/27/2019    CHOL 155 03/21/2019     Lab Results   Component Value Date    HDL 38 (L) 06/26/2020    HDL 40 09/27/2019     HDL 36 (L) 03/21/2019     Lab Results   Component Value Date    LDLCALC 55.6 (L) 06/26/2020    LDLCALC 69.0 09/27/2019    LDLCALC 85.2 03/21/2019     Lab Results   Component Value Date    TRIG 147 06/26/2020    TRIG 130 09/27/2019    TRIG 169 (H) 03/21/2019     Lab Results   Component Value Date    CHOLHDL 30.9 06/26/2020    CHOLHDL 29.6 09/27/2019    CHOLHDL 23.2 03/21/2019     ..  Lab Results   Component Value Date    WBC 9.50 02/13/2020    HGB 12.5 (L) 02/13/2020    HCT 38.3 (L) 02/13/2020    MCV 91 02/13/2020     02/13/2020       Test(s) Reviewed  I have reviewed the following in detail:  [] Stress test   [] Angiography   [x] Echocardiogram   [x] Labs   [] Other:       Assessment:         ICD-10-CM ICD-9-CM   1. Hypertension associated with diabetes  E11.59 250.80    I10 401.9   2. Mixed hyperlipidemia  E78.2 272.2   3. Longstanding persistent atrial fibrillation  I48.11 427.31   4. ROBERT (obstructive sleep apnea)  G47.33 327.23   5. Type 2 diabetes mellitus with other specified complication, without long-term current use of insulin  E11.69 250.80     Problem List Items Addressed This Visit     Hyperlipidemia    Hypertension associated with diabetes - Primary    Longstanding persistent atrial fibrillation    Diabetes mellitus, type 2    ROBERT (obstructive sleep apnea)           Plan:             Return to clinic 5 months  aerobic exercise 5x's/wk. Heart healthy diet and risk factor modification.    See labs and med orders.  Discussed rhythm vs rate control due to minimal sx's. Wants to try ep med then dccv and follow. Continue NOAC. Start flec dccv 4 weeks later      Portions of this note may have been created with voice recognition software.  Grammatical, syntax and spelling errors may be inevitable.

## 2020-10-08 NOTE — PATIENT INSTRUCTIONS
Cardioversion    Arrive for your procedure at:  [facility] 11/9 at 10 am      ? FASTING:  You MAY NOT have anything to eat or drink AFTER MIDNIGHT.  If your procedure is scheduled in the afternoon, you may have a LIGHT BREAKFAST BEFORE 6:00 A.M.  For example: Two slices of toast; black coffee or black tea.    ? MEDICATIONS:  You may take your regular morning medications with a small sip of water.     Hold or adjust the following:   Fluid pills.   Diabetes medications.    Continue: Coumadin, Plavix, Effient, Aspirin, Anti-coagulants, Blood thinners    Please refer to pre-op instructions received from [facility].

## 2020-10-08 NOTE — PROGRESS NOTES
LINKS immunization registry updated  Care Everywhere updated  Health Maintenance updated  Chart reviewed for overdue Proactive [facility]coun[facility] (EMIGDIO) health maintenance testing (CRS, Breast Ca, Diabetic Eye Exam)   Orders entered:N/A

## 2020-11-02 ENCOUNTER — PATIENT MESSAGE (OUTPATIENT)
Dept: FAMILY MEDICINE | Facility: CLINIC | Age: 65
End: 2020-11-02

## 2020-11-06 ENCOUNTER — OFFICE VISIT (OUTPATIENT)
Dept: FAMILY MEDICINE | Facility: CLINIC | Age: 65
End: 2020-11-06
Payer: MEDICARE

## 2020-11-06 ENCOUNTER — LAB VISIT (OUTPATIENT)
Dept: FAMILY MEDICINE | Facility: CLINIC | Age: 65
End: 2020-11-06
Payer: MEDICARE

## 2020-11-06 VITALS
HEIGHT: 65 IN | WEIGHT: 174.94 LBS | TEMPERATURE: 97 F | RESPIRATION RATE: 18 BRPM | DIASTOLIC BLOOD PRESSURE: 64 MMHG | SYSTOLIC BLOOD PRESSURE: 136 MMHG | BODY MASS INDEX: 29.15 KG/M2 | HEART RATE: 57 BPM

## 2020-11-06 DIAGNOSIS — S39.012A STRAIN OF LUMBAR PARASPINOUS MUSCLE, INITIAL ENCOUNTER: Primary | ICD-10-CM

## 2020-11-06 DIAGNOSIS — Z03.818 ENCNTR FOR OBS FOR SUSP EXPSR TO OTH BIOLG AGENTS RULED OUT: ICD-10-CM

## 2020-11-06 DIAGNOSIS — M62.838 MUSCLE SPASM: ICD-10-CM

## 2020-11-06 PROCEDURE — 99214 OFFICE O/P EST MOD 30 MIN: CPT | Mod: S$GLB,,, | Performed by: NURSE PRACTITIONER

## 2020-11-06 PROCEDURE — 99214 PR OFFICE/OUTPT VISIT, EST, LEVL IV, 30-39 MIN: ICD-10-PCS | Mod: S$GLB,,, | Performed by: NURSE PRACTITIONER

## 2020-11-06 PROCEDURE — U0003 INFECTIOUS AGENT DETECTION BY NUCLEIC ACID (DNA OR RNA); SEVERE ACUTE RESPIRATORY SYNDROME CORONAVIRUS 2 (SARS-COV-2) (CORONAVIRUS DISEASE [COVID-19]), AMPLIFIED PROBE TECHNIQUE, MAKING USE OF HIGH THROUGHPUT TECHNOLOGIES AS DESCRIBED BY CMS-2020-01-R: HCPCS

## 2020-11-06 RX ORDER — CLOBETASOL PROPIONATE 0.46 MG/ML
SOLUTION TOPICAL
COMMUNITY
Start: 2020-10-24 | End: 2021-08-12

## 2020-11-06 RX ORDER — NAPROXEN 500 MG/1
500 TABLET ORAL 2 TIMES DAILY WITH MEALS
Qty: 20 TABLET | Refills: 0 | Status: SHIPPED | OUTPATIENT
Start: 2020-11-06 | End: 2021-01-28

## 2020-11-06 RX ORDER — TIZANIDINE 4 MG/1
4 TABLET ORAL EVERY 8 HOURS
Qty: 20 TABLET | Refills: 0 | Status: SHIPPED | OUTPATIENT
Start: 2020-11-06 | End: 2020-11-16

## 2020-11-06 RX ORDER — DOXYCYCLINE 100 MG/1
1 CAPSULE ORAL 2 TIMES DAILY
COMMUNITY
Start: 2020-11-04 | End: 2021-01-28

## 2020-11-06 NOTE — PROGRESS NOTES
Subjective:       Patient ID: Trevor Johnston is a 65 y.o. male.    Chief Complaint: Low back, side pain (Rt side, symptoms about a week)    HPI here with concerns regarding lower back pain on the right side. New patient to me. Onset Sunday.  States had been picking up heavy stones and lumber[place] States hurts to rise from sitting position. No pain when up walking around or sitting, worse with movement. He takes tylenol as needed without relief. No radiation of pain down the leg. Has had three lithotripsy procedures this year. Does not feel like that. States he has had this in the past and it cleared up well with the muscle relaxer and naproxyn that was given to him.     He states he had his diabetic eye exam done. Will get a copy of that report. He has follow up with his PCP next month.    The following portion of the patients history was reviewed and updated as appropriate: allergies, current medications, past medical and surgical history. Past social history and problem list reviewed. Family PMH and Past social history reviewed. Tobacco, Illicit drug use reviewed.      Review of patient's allergies indicates:  No Known Allergies      Current Outpatient Medications:     allopurinoL (ZYLOPRIM) 300 MG tablet, TAKE 1 TABLET DAILY, Disp: 90 tablet, Rfl: 1    atorvastatin (LIPITOR) 40 MG tablet, TAKE 1 TABLET DAILY, Disp: 90 tablet, Rfl: 3    blood sugar diagnostic (FREESTYLE LITE STRIPS) Strp, , Disp: , Rfl:     blood sugar diagnostic (FREESTYLE LITE STRIPS) Strp, USE TO CHECK BLOOD GLUCOSE ONCE A DAY, Disp: 100 each, Rfl: 3    blood-glucose meter kit, To check BG one times daily, to use with insurance preferred meter, Disp: 1 each, Rfl: 0    clobetasoL (TEMOVATE) 0.05 % external solution, , Disp: , Rfl:     dorzolamide-timolol 2-0.5% (COSOPT) 22.3-6.8 mg/mL ophthalmic solution, 1 drop 2 (two) times daily., Disp: , Rfl:     flecainide (TAMBOCOR) 100 MG Tab, Take  1 tablet (100 mg total) by mouth every 12 (twelve) hours., Disp: 180 tablet, Rfl: 3    irbesartan (AVAPRO) 150 MG tablet, TAKE 1 TABLET DAILY, Disp: 90 tablet, Rfl: 3    lancets (FREESTYLE LANCETS) 28 gauge Misc, USE TO CHECK BLOOD GLUCOSE ONCE A DAY, Disp: 100 each, Rfl: 3    metFORMIN (GLUCOPHAGE) 500 MG tablet, Take 500 mg by mouth. Take 1 tablet in the morning and 2 tablets in the evening, Disp: , Rfl:     metoprolol succinate (TOPROL-XL) 25 MG 24 hr tablet, Take 1 tablet (25 mg total) by mouth every evening., Disp: 90 tablet, Rfl: 3    rivaroxaban (XARELTO) 20 mg Tab, Take 1 tablet (20 mg total) by mouth every evening., Disp: 90 tablet, Rfl: 4    rOPINIRole (REQUIP) 0.5 MG tablet, TAKE 1 TABLET EVERY EVENING (Patient taking differently: Take 0.5 mg by mouth every evening. ), Disp: 90 tablet, Rfl: 4    doxycycline (VIBRAMYCIN) 100 MG Cap, Take 1 capsule by mouth 2 (two) times daily., Disp: , Rfl:   No current facility-administered medications for this visit.     Facility-Administered Medications Ordered in Other Visits:     lactated ringers infusion, , Intravenous, Continuous, Tammy Marvin MD, Last Rate: 10 mL/hr at 08/12/20 1150    lidocaine (PF) 10 mg/ml (1%) injection 10 mg, 1 mL, Intradermal, Once, Tammy Marvin MD    Past Medical History:   Diagnosis Date    Anticoagulant long-term use     Atrial fibrillation     Diabetes mellitus, type 2     Fatty liver disease, nonalcoholic     noted on CT    H/O cardiovascular stress test     normal 6/20    Hyperlipidemia     Hypertension     Hyperuricemia     Kidney stones     multiple episodes - on allopurinol    Macular degeneration, right eye     ROBERT (obstructive sleep apnea)     does not use CPAP    RLS (restless legs syndrome)     Valvular heart disease     mod MR and mild TR on 6/20 echo       Past Surgical History:   Procedure Laterality Date    CATARACT EXTRACTION W/  INTRAOCULAR LENS IMPLANT Right 02/2017     CYSTOURETEROSCOPY WITH RETROGRADE PYELOGRAPHY AND INSERTION OF STENT INTO URETER Left 3/5/2020    Procedure: CYSTOURETEROSCOPY, WITH RETROGRADE PYELOGRAM AND URETERAL STENT EXCHANGE, flexible ureterscope;  Surgeon: Sb Connelly MD;  Location: [facility] OR;  Service: Urology;  Laterality: Left;    EXTRACORPOREAL SHOCK WAVE LITHOTRIPSY Left 7/15/2020    Procedure: LITHOTRIPSY, ESWL;  Surgeon: Sb Connelly MD;  Location: [facility];  Service: Urology;  Laterality: Left;    EXTRACORPOREAL SHOCK WAVE LITHOTRIPSY N/A 8/12/2020    Procedure: LITHOTRIPSY, ESWL;  Surgeon: Sb Connelly MD;  Location: [facility] OR;  Service: Urology;  Laterality: N/A;    LASER LITHOTRIPSY Left 3/5/2020    Procedure: LITHOTRIPSY, USING LASER;  Surgeon: Sb Connelly MD;  Location: [facility] OR;  Service: Urology;  Laterality: Left;    ROTATOR CUFF REPAIR Right     TRIGGER FINGER RELEASE Bilateral     VASECTOMY         Social History     Socioeconomic History    Marital status: Single     Spouse name: Not on file    Number of children: Not on file    Years of education: Not on file    Highest education level: Not on file   Occupational History    Not on file   Social Needs    Financial resource strain: Not hard at all    Food insecurity     Worry: Never true     Inability: Never true    Transportation needs     Medical: No     Non-medical: No   Tobacco Use    Smoking status: Former Smoker     Years: 45.00     Quit date: 01/2017     Years since quitting: 3.8    Smokeless tobacco: Never Used    Tobacco comment: 45 year, about 1PPW, quit 1/2017   Substance and Sexual Activity    Alcohol use: Not Currently     Frequency: Monthly or less     Drinks per session: 1 or 2     Binge frequency: Never    Drug use: No    Sexual activity: Yes     Partners: Female     Birth control/protection: See Surgical Hx   Lifestyle    Physical activity     Days per week: 1 day     Minutes per session: 0 min    Stress: Not at all  "  Relationships    Social connections     Talks on phone: More than three times a week     Gets together: Once a week     Attends Hinduism service: Not on file     Active member of club or organization: No     Attends meetings of clubs or organizations: Never     Relationship status: Living with partner   Other Topics Concern    Not on file   Social History Narrative    9/20/18: he is retired. He used to work as a  for 10 years and also an . The last 10 years he was an  for [employer]. He lives with his wife. He has a poodle at home. He has two kids; they live nearby.          Review of Systems   Constitutional: Negative for fatigue and fever.   HENT: Negative for rhinorrhea.    Eyes: Negative for visual disturbance.   Respiratory: Negative for cough, chest tightness, shortness of breath and wheezing.    Cardiovascular: Negative for chest pain, palpitations and leg swelling.   Gastrointestinal: Negative for abdominal pain, blood in stool, diarrhea, nausea and vomiting.   Genitourinary: Negative for difficulty urinating, flank pain, frequency and urgency.   Musculoskeletal: Positive for arthralgias and back pain. Negative for gait problem.        See HPI   Skin: Negative for rash.   Neurological: Negative for weakness and headaches.       Objective:      /64   Pulse (!) 57   Temp 97.3 °F (36.3 °C) (Temporal)   Resp 18   Ht 5' 5" (1.651 m)   Wt 79.3 kg (174 lb 15 oz)   BMI 29.11 kg/m²      Physical Exam  Vitals signs and nursing note reviewed.   Constitutional:       General: He is not in acute distress.     Appearance: He is well-developed and normal weight. He is not diaphoretic.   HENT:      Head: Normocephalic and atraumatic.      Mouth/Throat:      Pharynx: No oropharyngeal exudate.   Eyes:      General:         Right eye: No discharge.         Left eye: No discharge.      Conjunctiva/sclera: Conjunctivae normal.      Pupils: Pupils are equal, round, and reactive to light. "   Neck:      Musculoskeletal: Full passive range of motion without pain, normal range of motion and neck supple.      Thyroid: No thyromegaly.      Vascular: No JVD.   Cardiovascular:      Rate and Rhythm: Rhythm regularly irregular.      Heart sounds: Normal heart sounds. No murmur. No gallop.    Pulmonary:      Effort: Pulmonary effort is normal. No respiratory distress.      Breath sounds: Normal breath sounds. No wheezing or rales.   Chest:      Chest wall: No tenderness.   Abdominal:      General: Bowel sounds are normal. There is no distension.      Palpations: Abdomen is soft.      Tenderness: There is no abdominal tenderness. There is no guarding or rebound.   Musculoskeletal: Normal range of motion.      Comments: Gait and coordination normal.  strong, equal. Upper and lower extremity strength normal. He has tenderness along the right lower back area with palpation. Muscle tightness noted. Negative straight leg raises.    Lymphadenopathy:      Cervical: No cervical adenopathy.   Skin:     General: Skin is warm and dry.      Capillary Refill: Capillary refill takes less than 2 seconds.      Findings: No rash.   Neurological:      Mental Status: He is alert and oriented to person, place, and time.   Psychiatric:         Attention and Perception: Attention and perception normal.         Mood and Affect: Mood and affect normal.         Speech: Speech normal.         Behavior: Behavior normal.         Thought Content: Thought content normal.         Assessment:       1. Strain of lumbar paraspinous muscle, initial encounter    2. Muscle spasm        Plan:       Strain of lumbar paraspinous muscle, initial encounter: moist heat. Light stretching. Avoid heavy lifting. Will give muscle relaxer and naproxyn. He has done well with these in the past.     Muscle spasm    Other orders  -     tiZANidine (ZANAFLEX) 4 MG tablet; Take 1 tablet (4 mg total) by mouth every 8 (eight) hours. for 10 days  Dispense: 20  tablet; Refill: 0  -     naproxen (EC NAPROSYN) 500 MG EC tablet; Take 1 tablet (500 mg total) by mouth 2 (two) times daily with meals.  Dispense: 20 tablet; Refill: 0       Continue current medication  Take medications only as prescribed  Healthy diet, exercise  Adequate rest  Adequate hydration  Avoid allergens  Avoid excessive caffeine     follow up if not improving. Will get a copy of his diabetic eye exam for records.

## 2020-11-07 LAB — SARS-COV-2 RNA RESP QL NAA+PROBE: NOT DETECTED

## 2020-12-11 ENCOUNTER — PATIENT MESSAGE (OUTPATIENT)
Dept: OTHER | Facility: OTHER | Age: 65
End: 2020-12-11

## 2021-01-04 ENCOUNTER — PATIENT MESSAGE (OUTPATIENT)
Dept: ADMINISTRATIVE | Facility: HOSPITAL | Age: 66
End: 2021-01-04

## 2021-01-05 ENCOUNTER — LAB VISIT (OUTPATIENT)
Dept: LAB | Facility: HOSPITAL | Age: 66
End: 2021-01-05
Attending: FAMILY MEDICINE
Payer: MEDICARE

## 2021-01-05 DIAGNOSIS — E11.8 DIABETES MELLITUS TYPE 2 WITH COMPLICATIONS: ICD-10-CM

## 2021-01-05 DIAGNOSIS — E79.0 HYPERURICEMIA: ICD-10-CM

## 2021-01-05 LAB
BASOPHILS # BLD AUTO: 0.07 K/UL (ref 0–0.2)
BASOPHILS NFR BLD: 0.8 % (ref 0–1.9)
DIFFERENTIAL METHOD: ABNORMAL
EOSINOPHIL # BLD AUTO: 0.2 K/UL (ref 0–0.5)
EOSINOPHIL NFR BLD: 2.8 % (ref 0–8)
ERYTHROCYTE [DISTWIDTH] IN BLOOD BY AUTOMATED COUNT: 13.5 % (ref 11.5–14.5)
HCT VFR BLD AUTO: 41.1 % (ref 40–54)
HGB BLD-MCNC: 12.8 G/DL (ref 14–18)
IMM GRANULOCYTES # BLD AUTO: 0.03 K/UL (ref 0–0.04)
IMM GRANULOCYTES NFR BLD AUTO: 0.4 % (ref 0–0.5)
LYMPHOCYTES # BLD AUTO: 2.1 K/UL (ref 1–4.8)
LYMPHOCYTES NFR BLD: 25.1 % (ref 18–48)
MCH RBC QN AUTO: 29.8 PG (ref 27–31)
MCHC RBC AUTO-ENTMCNC: 31.1 G/DL (ref 32–36)
MCV RBC AUTO: 96 FL (ref 82–98)
MONOCYTES # BLD AUTO: 0.7 K/UL (ref 0.3–1)
MONOCYTES NFR BLD: 8.3 % (ref 4–15)
NEUTROPHILS # BLD AUTO: 5.2 K/UL (ref 1.8–7.7)
NEUTROPHILS NFR BLD: 62.6 % (ref 38–73)
NRBC BLD-RTO: 0 /100 WBC
PLATELET # BLD AUTO: 247 K/UL (ref 150–350)
PMV BLD AUTO: 9.5 FL (ref 9.2–12.9)
RBC # BLD AUTO: 4.29 M/UL (ref 4.6–6.2)
WBC # BLD AUTO: 8.28 K/UL (ref 3.9–12.7)

## 2021-01-05 PROCEDURE — 36415 COLL VENOUS BLD VENIPUNCTURE: CPT | Mod: PO

## 2021-01-05 PROCEDURE — 85025 COMPLETE CBC W/AUTO DIFF WBC: CPT

## 2021-01-05 PROCEDURE — 84550 ASSAY OF BLOOD/URIC ACID: CPT

## 2021-01-05 PROCEDURE — 80053 COMPREHEN METABOLIC PANEL: CPT

## 2021-01-05 PROCEDURE — 83036 HEMOGLOBIN GLYCOSYLATED A1C: CPT

## 2021-01-06 LAB
ALBUMIN SERPL BCP-MCNC: 4.1 G/DL (ref 3.5–5.2)
ALP SERPL-CCNC: 89 U/L (ref 55–135)
ALT SERPL W/O P-5'-P-CCNC: 30 U/L (ref 10–44)
ANION GAP SERPL CALC-SCNC: 9 MMOL/L (ref 8–16)
AST SERPL-CCNC: 27 U/L (ref 10–40)
BILIRUB SERPL-MCNC: 0.4 MG/DL (ref 0.1–1)
BUN SERPL-MCNC: 16 MG/DL (ref 8–23)
CALCIUM SERPL-MCNC: 9.6 MG/DL (ref 8.7–10.5)
CHLORIDE SERPL-SCNC: 102 MMOL/L (ref 95–110)
CO2 SERPL-SCNC: 28 MMOL/L (ref 23–29)
CREAT SERPL-MCNC: 0.9 MG/DL (ref 0.5–1.4)
EST. GFR  (AFRICAN AMERICAN): >60 ML/MIN/1.73 M^2
EST. GFR  (NON AFRICAN AMERICAN): >60 ML/MIN/1.73 M^2
ESTIMATED AVG GLUCOSE: 131 MG/DL (ref 68–131)
GLUCOSE SERPL-MCNC: 129 MG/DL (ref 70–110)
HBA1C MFR BLD HPLC: 6.2 % (ref 4–5.6)
POTASSIUM SERPL-SCNC: 4.2 MMOL/L (ref 3.5–5.1)
PROT SERPL-MCNC: 7.4 G/DL (ref 6–8.4)
SODIUM SERPL-SCNC: 139 MMOL/L (ref 136–145)
URATE SERPL-MCNC: 3.6 MG/DL (ref 3.4–7)

## 2021-01-11 ENCOUNTER — PATIENT MESSAGE (OUTPATIENT)
Dept: FAMILY MEDICINE | Facility: CLINIC | Age: 66
End: 2021-01-11

## 2021-01-19 LAB
LEFT EYE DM RETINOPATHY: NEGATIVE
RIGHT EYE DM RETINOPATHY: NEGATIVE

## 2021-01-23 ENCOUNTER — PATIENT MESSAGE (OUTPATIENT)
Dept: FAMILY MEDICINE | Facility: CLINIC | Age: 66
End: 2021-01-23

## 2021-01-28 ENCOUNTER — OFFICE VISIT (OUTPATIENT)
Dept: FAMILY MEDICINE | Facility: CLINIC | Age: 66
End: 2021-01-28
Payer: MEDICARE

## 2021-01-28 ENCOUNTER — TELEPHONE (OUTPATIENT)
Dept: FAMILY MEDICINE | Facility: CLINIC | Age: 66
End: 2021-01-28

## 2021-01-28 VITALS
DIASTOLIC BLOOD PRESSURE: 76 MMHG | TEMPERATURE: 98 F | SYSTOLIC BLOOD PRESSURE: 124 MMHG | BODY MASS INDEX: 30.58 KG/M2 | WEIGHT: 183.56 LBS | HEIGHT: 65 IN | HEART RATE: 68 BPM | OXYGEN SATURATION: 97 % | RESPIRATION RATE: 18 BRPM

## 2021-01-28 DIAGNOSIS — E11.59 HYPERTENSION ASSOCIATED WITH DIABETES: ICD-10-CM

## 2021-01-28 DIAGNOSIS — I15.2 HYPERTENSION ASSOCIATED WITH DIABETES: ICD-10-CM

## 2021-01-28 DIAGNOSIS — E78.5 HYPERLIPIDEMIA ASSOCIATED WITH TYPE 2 DIABETES MELLITUS: ICD-10-CM

## 2021-01-28 DIAGNOSIS — E11.8 DIABETES MELLITUS TYPE 2 WITH COMPLICATIONS: Primary | ICD-10-CM

## 2021-01-28 DIAGNOSIS — E11.69 HYPERLIPIDEMIA ASSOCIATED WITH TYPE 2 DIABETES MELLITUS: ICD-10-CM

## 2021-01-28 DIAGNOSIS — Z13.6 SCREENING FOR AAA (ABDOMINAL AORTIC ANEURYSM): ICD-10-CM

## 2021-01-28 PROCEDURE — 90670 PCV13 VACCINE IM: CPT | Mod: S$GLB,,, | Performed by: FAMILY MEDICINE

## 2021-01-28 PROCEDURE — 99214 PR OFFICE/OUTPT VISIT, EST, LEVL IV, 30-39 MIN: ICD-10-PCS | Mod: S$GLB,25,, | Performed by: FAMILY MEDICINE

## 2021-01-28 PROCEDURE — G0009 PNEUMOCOCCAL CONJUGATE VACCINE 13-VALENT LESS THAN 5YO & GREATER THAN: ICD-10-PCS | Mod: S$GLB,,, | Performed by: FAMILY MEDICINE

## 2021-01-28 PROCEDURE — 90670 PNEUMOCOCCAL CONJUGATE VACCINE 13-VALENT LESS THAN 5YO & GREATER THAN: ICD-10-PCS | Mod: S$GLB,,, | Performed by: FAMILY MEDICINE

## 2021-01-28 PROCEDURE — G0009 ADMIN PNEUMOCOCCAL VACCINE: HCPCS | Mod: S$GLB,,, | Performed by: FAMILY MEDICINE

## 2021-01-28 PROCEDURE — 99214 OFFICE O/P EST MOD 30 MIN: CPT | Mod: S$GLB,25,, | Performed by: FAMILY MEDICINE

## 2021-01-28 RX ORDER — BRIMONIDINE TARTRATE 2 MG/ML
1 SOLUTION/ DROPS OPHTHALMIC 2 TIMES DAILY
COMMUNITY
Start: 2021-01-26

## 2021-01-28 RX ORDER — BENZONATATE 200 MG/1
200 CAPSULE ORAL 3 TIMES DAILY PRN
Qty: 30 CAPSULE | Refills: 0 | Status: SHIPPED | OUTPATIENT
Start: 2021-01-28 | End: 2021-02-07

## 2021-02-10 ENCOUNTER — HOSPITAL ENCOUNTER (OUTPATIENT)
Dept: RADIOLOGY | Facility: HOSPITAL | Age: 66
Discharge: HOME | End: 2021-02-10
Attending: FAMILY MEDICINE
Payer: MEDICARE

## 2021-02-10 DIAGNOSIS — Z13.6 SCREENING FOR AAA (ABDOMINAL AORTIC ANEURYSM): ICD-10-CM

## 2021-02-10 PROCEDURE — 76775 US ABDOMINAL AORTA: ICD-10-PCS | Mod: 26,,, | Performed by: RADIOLOGY

## 2021-02-10 PROCEDURE — 76775 US EXAM ABDO BACK WALL LIM: CPT | Mod: TC,PO

## 2021-02-10 PROCEDURE — 76775 US EXAM ABDO BACK WALL LIM: CPT | Mod: 26,,, | Performed by: RADIOLOGY

## 2021-02-11 ENCOUNTER — PATIENT MESSAGE (OUTPATIENT)
Dept: FAMILY MEDICINE | Facility: CLINIC | Age: 66
End: 2021-02-11

## 2021-02-19 ENCOUNTER — OFFICE VISIT (OUTPATIENT)
Dept: CARDIOLOGY | Facility: CLINIC | Age: 66
End: 2021-02-19
Payer: MEDICARE

## 2021-02-19 VITALS
HEIGHT: 65 IN | WEIGHT: 185.63 LBS | HEART RATE: 78 BPM | BODY MASS INDEX: 30.93 KG/M2 | SYSTOLIC BLOOD PRESSURE: 126 MMHG | DIASTOLIC BLOOD PRESSURE: 84 MMHG

## 2021-02-19 DIAGNOSIS — I48.0 PAROXYSMAL ATRIAL FIBRILLATION: Primary | ICD-10-CM

## 2021-02-19 DIAGNOSIS — I10 ESSENTIAL HYPERTENSION: ICD-10-CM

## 2021-02-19 DIAGNOSIS — E11.69 TYPE 2 DIABETES MELLITUS WITH OTHER SPECIFIED COMPLICATION, WITHOUT LONG-TERM CURRENT USE OF INSULIN: ICD-10-CM

## 2021-02-19 PROCEDURE — 99999 PR PBB SHADOW E&M-EST. PATIENT-LVL IV: CPT | Mod: PBBFAC,,, | Performed by: INTERNAL MEDICINE

## 2021-02-19 PROCEDURE — 99214 OFFICE O/P EST MOD 30 MIN: CPT | Mod: PBBFAC,PO | Performed by: INTERNAL MEDICINE

## 2021-02-19 PROCEDURE — 99999 PR PBB SHADOW E&M-EST. PATIENT-LVL IV: ICD-10-PCS | Mod: PBBFAC,,, | Performed by: INTERNAL MEDICINE

## 2021-02-19 PROCEDURE — 99214 OFFICE O/P EST MOD 30 MIN: CPT | Mod: S$PBB,,, | Performed by: INTERNAL MEDICINE

## 2021-02-19 PROCEDURE — 99214 PR OFFICE/OUTPT VISIT, EST, LEVL IV, 30-39 MIN: ICD-10-PCS | Mod: S$PBB,,, | Performed by: INTERNAL MEDICINE

## 2021-02-24 ENCOUNTER — PATIENT OUTREACH (OUTPATIENT)
Dept: ADMINISTRATIVE | Facility: HOSPITAL | Age: 66
End: 2021-02-24

## 2021-03-15 ENCOUNTER — TELEPHONE (OUTPATIENT)
Dept: CARDIOLOGY | Facility: CLINIC | Age: 66
End: 2021-03-15

## 2021-03-15 RX ORDER — ROPINIROLE 0.5 MG/1
TABLET, FILM COATED ORAL
Qty: 90 TABLET | Refills: 2 | Status: SHIPPED | OUTPATIENT
Start: 2021-03-15 | End: 2021-12-11

## 2021-03-17 PROBLEM — R97.20 ELEVATED PSA: Status: ACTIVE | Noted: 2021-03-17

## 2021-03-23 ENCOUNTER — IMMUNIZATION (OUTPATIENT)
Dept: FAMILY MEDICINE | Facility: CLINIC | Age: 66
End: 2021-03-23
Payer: MEDICARE

## 2021-03-23 DIAGNOSIS — Z23 NEED FOR VACCINATION: Primary | ICD-10-CM

## 2021-03-23 PROCEDURE — 0001A COVID-19, MRNA, LNP-S, PF, 30 MCG/0.3 ML DOSE VACCINE: ICD-10-PCS | Mod: CV19,,, | Performed by: FAMILY MEDICINE

## 2021-03-23 PROCEDURE — 0001A COVID-19, MRNA, LNP-S, PF, 30 MCG/0.3 ML DOSE VACCINE: CPT | Mod: CV19,,, | Performed by: FAMILY MEDICINE

## 2021-03-23 PROCEDURE — 91300 COVID-19, MRNA, LNP-S, PF, 30 MCG/0.3 ML DOSE VACCINE: ICD-10-PCS | Mod: ,,, | Performed by: FAMILY MEDICINE

## 2021-03-23 PROCEDURE — 91300 COVID-19, MRNA, LNP-S, PF, 30 MCG/0.3 ML DOSE VACCINE: CPT | Mod: ,,, | Performed by: FAMILY MEDICINE

## 2021-03-27 DIAGNOSIS — E11.69 TYPE 2 DIABETES MELLITUS WITH OTHER SPECIFIED COMPLICATION, WITHOUT LONG-TERM CURRENT USE OF INSULIN: ICD-10-CM

## 2021-03-30 RX ORDER — METFORMIN HYDROCHLORIDE 500 MG/1
TABLET, EXTENDED RELEASE ORAL
Qty: 270 TABLET | Refills: 1 | Status: SHIPPED | OUTPATIENT
Start: 2021-03-30 | End: 2021-09-07

## 2021-04-13 ENCOUNTER — IMMUNIZATION (OUTPATIENT)
Dept: FAMILY MEDICINE | Facility: CLINIC | Age: 66
End: 2021-04-13
Payer: MEDICARE

## 2021-04-13 DIAGNOSIS — Z23 NEED FOR VACCINATION: Primary | ICD-10-CM

## 2021-04-13 PROCEDURE — 91300 COVID-19, MRNA, LNP-S, PF, 30 MCG/0.3 ML DOSE VACCINE: CPT | Mod: S$GLB,,, | Performed by: INTERNAL MEDICINE

## 2021-04-13 PROCEDURE — 0002A COVID-19, MRNA, LNP-S, PF, 30 MCG/0.3 ML DOSE VACCINE: CPT | Mod: CV19,S$GLB,, | Performed by: INTERNAL MEDICINE

## 2021-04-13 PROCEDURE — 0002A COVID-19, MRNA, LNP-S, PF, 30 MCG/0.3 ML DOSE VACCINE: ICD-10-PCS | Mod: CV19,S$GLB,, | Performed by: INTERNAL MEDICINE

## 2021-04-13 PROCEDURE — 91300 COVID-19, MRNA, LNP-S, PF, 30 MCG/0.3 ML DOSE VACCINE: ICD-10-PCS | Mod: S$GLB,,, | Performed by: INTERNAL MEDICINE

## 2021-04-22 DIAGNOSIS — I48.0 PAF (PAROXYSMAL ATRIAL FIBRILLATION): ICD-10-CM

## 2021-04-22 RX ORDER — RIVAROXABAN 20 MG/1
TABLET, FILM COATED ORAL
Qty: 90 TABLET | Refills: 4 | Status: SHIPPED | OUTPATIENT
Start: 2021-04-22 | End: 2022-07-18

## 2021-06-21 RX ORDER — METOPROLOL SUCCINATE 25 MG/1
25 TABLET, EXTENDED RELEASE ORAL 2 TIMES DAILY
Qty: 90 TABLET | Refills: 4 | Status: SHIPPED | OUTPATIENT
Start: 2021-06-21 | End: 2021-11-30 | Stop reason: SDUPTHER

## 2021-07-07 ENCOUNTER — PATIENT MESSAGE (OUTPATIENT)
Dept: ADMINISTRATIVE | Facility: HOSPITAL | Age: 66
End: 2021-07-07

## 2021-07-07 ENCOUNTER — TELEPHONE (OUTPATIENT)
Dept: FAMILY MEDICINE | Facility: CLINIC | Age: 66
End: 2021-07-07

## 2021-07-13 NOTE — TELEPHONE ENCOUNTER
----- Message from Jackie Dickson sent at 11/13/2017 11:57 AM CST -----  Contact: [phone]  Patient would like to know the status of his referral that was requested to go to [facility]  
Spoke with pt inform him we sent prior auth to Mrs. Edmonds and are waiting to an answer. Pt voices understanding.   
13-Jul-2021

## 2021-07-21 ENCOUNTER — LAB VISIT (OUTPATIENT)
Dept: LAB | Facility: HOSPITAL | Age: 66
End: 2021-07-21
Attending: FAMILY MEDICINE
Payer: MEDICARE

## 2021-07-21 DIAGNOSIS — E78.5 HYPERLIPIDEMIA ASSOCIATED WITH TYPE 2 DIABETES MELLITUS: ICD-10-CM

## 2021-07-21 DIAGNOSIS — E11.8 DIABETES MELLITUS TYPE 2 WITH COMPLICATIONS: ICD-10-CM

## 2021-07-21 DIAGNOSIS — E11.69 HYPERLIPIDEMIA ASSOCIATED WITH TYPE 2 DIABETES MELLITUS: ICD-10-CM

## 2021-07-21 LAB
CHOLEST SERPL-MCNC: 123 MG/DL (ref 120–199)
CHOLEST/HDLC SERPL: 3.3 {RATIO} (ref 2–5)
ESTIMATED AVG GLUCOSE: 157 MG/DL (ref 68–131)
HBA1C MFR BLD: 7.1 % (ref 4–5.6)
HDLC SERPL-MCNC: 37 MG/DL (ref 40–75)
HDLC SERPL: 30.1 % (ref 20–50)
LDLC SERPL CALC-MCNC: 61 MG/DL (ref 63–159)
NONHDLC SERPL-MCNC: 86 MG/DL
TRIGL SERPL-MCNC: 125 MG/DL (ref 30–150)

## 2021-07-21 PROCEDURE — 36415 COLL VENOUS BLD VENIPUNCTURE: CPT | Mod: PO | Performed by: FAMILY MEDICINE

## 2021-07-21 PROCEDURE — 80061 LIPID PANEL: CPT | Performed by: FAMILY MEDICINE

## 2021-07-21 PROCEDURE — 83036 HEMOGLOBIN GLYCOSYLATED A1C: CPT | Performed by: FAMILY MEDICINE

## 2021-07-22 ENCOUNTER — PATIENT MESSAGE (OUTPATIENT)
Dept: FAMILY MEDICINE | Facility: CLINIC | Age: 66
End: 2021-07-22

## 2021-07-26 ENCOUNTER — TELEPHONE (OUTPATIENT)
Dept: FAMILY MEDICINE | Facility: CLINIC | Age: 66
End: 2021-07-26

## 2021-07-27 ENCOUNTER — TELEPHONE (OUTPATIENT)
Dept: FAMILY MEDICINE | Facility: CLINIC | Age: 66
End: 2021-07-27

## 2021-08-12 ENCOUNTER — OFFICE VISIT (OUTPATIENT)
Dept: FAMILY MEDICINE | Facility: CLINIC | Age: 66
End: 2021-08-12
Payer: MEDICARE

## 2021-08-12 VITALS
WEIGHT: 185.19 LBS | HEART RATE: 80 BPM | TEMPERATURE: 98 F | OXYGEN SATURATION: 98 % | BODY MASS INDEX: 30.82 KG/M2 | SYSTOLIC BLOOD PRESSURE: 118 MMHG | DIASTOLIC BLOOD PRESSURE: 78 MMHG | RESPIRATION RATE: 14 BRPM

## 2021-08-12 DIAGNOSIS — E11.69 HYPERLIPIDEMIA ASSOCIATED WITH TYPE 2 DIABETES MELLITUS: ICD-10-CM

## 2021-08-12 DIAGNOSIS — E78.5 HYPERLIPIDEMIA ASSOCIATED WITH TYPE 2 DIABETES MELLITUS: ICD-10-CM

## 2021-08-12 DIAGNOSIS — D53.9 NUTRITIONAL ANEMIA, UNSPECIFIED: ICD-10-CM

## 2021-08-12 DIAGNOSIS — K62.5 RECTAL BLEEDING: ICD-10-CM

## 2021-08-12 DIAGNOSIS — E11.8 DIABETES MELLITUS TYPE 2 WITH COMPLICATIONS: Primary | ICD-10-CM

## 2021-08-12 PROCEDURE — 99214 PR OFFICE/OUTPT VISIT, EST, LEVL IV, 30-39 MIN: ICD-10-PCS | Mod: S$GLB,,, | Performed by: FAMILY MEDICINE

## 2021-08-12 PROCEDURE — 99214 OFFICE O/P EST MOD 30 MIN: CPT | Mod: S$GLB,,, | Performed by: FAMILY MEDICINE

## 2021-08-12 RX ORDER — GLIMEPIRIDE 2 MG/1
2 TABLET ORAL
Qty: 90 TABLET | Refills: 0 | Status: SHIPPED | OUTPATIENT
Start: 2021-08-12 | End: 2021-09-15

## 2021-08-23 ENCOUNTER — TELEPHONE (OUTPATIENT)
Dept: FAMILY MEDICINE | Facility: CLINIC | Age: 66
End: 2021-08-23

## 2021-08-25 ENCOUNTER — OFFICE VISIT (OUTPATIENT)
Dept: SURGERY | Facility: CLINIC | Age: 66
End: 2021-08-25
Payer: MEDICARE

## 2021-08-25 ENCOUNTER — LAB VISIT (OUTPATIENT)
Dept: LAB | Facility: HOSPITAL | Age: 66
End: 2021-08-25
Attending: FAMILY MEDICINE
Payer: MEDICARE

## 2021-08-25 VITALS
BODY MASS INDEX: 30.67 KG/M2 | DIASTOLIC BLOOD PRESSURE: 79 MMHG | HEIGHT: 65 IN | HEART RATE: 72 BPM | SYSTOLIC BLOOD PRESSURE: 135 MMHG | TEMPERATURE: 99 F | WEIGHT: 184.06 LBS

## 2021-08-25 DIAGNOSIS — Z01.818 PRE-OP TESTING: ICD-10-CM

## 2021-08-25 DIAGNOSIS — K62.5 RECTAL BLEEDING: ICD-10-CM

## 2021-08-25 DIAGNOSIS — E11.8 DIABETES MELLITUS TYPE 2 WITH COMPLICATIONS: ICD-10-CM

## 2021-08-25 DIAGNOSIS — D53.9 NUTRITIONAL ANEMIA, UNSPECIFIED: ICD-10-CM

## 2021-08-25 LAB
ESTIMATED AVG GLUCOSE: 151 MG/DL (ref 68–131)
FERRITIN SERPL-MCNC: 10 NG/ML (ref 20–300)
FOLATE SERPL-MCNC: 13.2 NG/ML (ref 4–24)
HBA1C MFR BLD: 6.9 % (ref 4–5.6)
IRON SERPL-MCNC: 99 UG/DL (ref 45–160)
SATURATED IRON: 18 % (ref 20–50)
TOTAL IRON BINDING CAPACITY: 539 UG/DL (ref 250–450)
TRANSFERRIN SERPL-MCNC: 364 MG/DL (ref 200–375)
VIT B12 SERPL-MCNC: 392 PG/ML (ref 210–950)

## 2021-08-25 PROCEDURE — 99999 PR PBB SHADOW E&M-EST. PATIENT-LVL V: CPT | Mod: PBBFAC,,, | Performed by: SURGERY

## 2021-08-25 PROCEDURE — 99204 PR OFFICE/OUTPT VISIT, NEW, LEVL IV, 45-59 MIN: ICD-10-PCS | Mod: S$PBB,,, | Performed by: SURGERY

## 2021-08-25 PROCEDURE — 99999 PR PBB SHADOW E&M-EST. PATIENT-LVL V: ICD-10-PCS | Mod: PBBFAC,,, | Performed by: SURGERY

## 2021-08-25 PROCEDURE — 99204 OFFICE O/P NEW MOD 45 MIN: CPT | Mod: S$PBB,,, | Performed by: SURGERY

## 2021-08-25 PROCEDURE — 82728 ASSAY OF FERRITIN: CPT | Performed by: FAMILY MEDICINE

## 2021-08-25 PROCEDURE — 36415 COLL VENOUS BLD VENIPUNCTURE: CPT | Mod: PO | Performed by: FAMILY MEDICINE

## 2021-08-25 PROCEDURE — 82746 ASSAY OF FOLIC ACID SERUM: CPT | Performed by: FAMILY MEDICINE

## 2021-08-25 PROCEDURE — 83036 HEMOGLOBIN GLYCOSYLATED A1C: CPT | Performed by: FAMILY MEDICINE

## 2021-08-25 PROCEDURE — 82607 VITAMIN B-12: CPT | Performed by: FAMILY MEDICINE

## 2021-08-25 PROCEDURE — 84466 ASSAY OF TRANSFERRIN: CPT | Performed by: FAMILY MEDICINE

## 2021-08-25 PROCEDURE — 99215 OFFICE O/P EST HI 40 MIN: CPT | Mod: PBBFAC,PO | Performed by: SURGERY

## 2021-08-25 RX ORDER — ZOSTER VACCINE RECOMBINANT, ADJUVANTED 50 MCG/0.5
KIT INTRAMUSCULAR
COMMUNITY
Start: 2020-06-02 | End: 2021-09-20 | Stop reason: ALTCHOICE

## 2021-09-05 DIAGNOSIS — E11.69 TYPE 2 DIABETES MELLITUS WITH OTHER SPECIFIED COMPLICATION, WITHOUT LONG-TERM CURRENT USE OF INSULIN: ICD-10-CM

## 2021-09-07 RX ORDER — METFORMIN HYDROCHLORIDE 500 MG/1
500 TABLET, EXTENDED RELEASE ORAL 2 TIMES DAILY WITH MEALS
Qty: 270 TABLET | Refills: 1 | Status: SHIPPED | OUTPATIENT
Start: 2021-09-07 | End: 2022-06-08

## 2021-09-11 ENCOUNTER — PATIENT MESSAGE (OUTPATIENT)
Dept: FAMILY MEDICINE | Facility: CLINIC | Age: 66
End: 2021-09-11

## 2021-09-13 ENCOUNTER — OFFICE VISIT (OUTPATIENT)
Dept: CARDIOLOGY | Facility: CLINIC | Age: 66
End: 2021-09-13
Payer: MEDICARE

## 2021-09-13 ENCOUNTER — TELEPHONE (OUTPATIENT)
Dept: CARDIOLOGY | Facility: CLINIC | Age: 66
End: 2021-09-13

## 2021-09-13 VITALS
HEIGHT: 65 IN | SYSTOLIC BLOOD PRESSURE: 123 MMHG | DIASTOLIC BLOOD PRESSURE: 71 MMHG | HEART RATE: 63 BPM | WEIGHT: 185.44 LBS | BODY MASS INDEX: 30.89 KG/M2

## 2021-09-13 DIAGNOSIS — I25.3 PFO WITH ATRIAL SEPTAL ANEURYSM: ICD-10-CM

## 2021-09-13 DIAGNOSIS — E78.2 MIXED HYPERLIPIDEMIA: ICD-10-CM

## 2021-09-13 DIAGNOSIS — I48.0 PAROXYSMAL ATRIAL FIBRILLATION: Primary | ICD-10-CM

## 2021-09-13 DIAGNOSIS — Z01.818 PRE-OP TESTING: ICD-10-CM

## 2021-09-13 DIAGNOSIS — I15.2 HYPERTENSION ASSOCIATED WITH DIABETES: ICD-10-CM

## 2021-09-13 DIAGNOSIS — Q21.12 PFO WITH ATRIAL SEPTAL ANEURYSM: ICD-10-CM

## 2021-09-13 DIAGNOSIS — E11.59 HYPERTENSION ASSOCIATED WITH DIABETES: ICD-10-CM

## 2021-09-13 DIAGNOSIS — Z12.11 SCREEN FOR COLON CANCER: Primary | ICD-10-CM

## 2021-09-13 PROCEDURE — 99214 OFFICE O/P EST MOD 30 MIN: CPT | Mod: S$PBB,,, | Performed by: INTERNAL MEDICINE

## 2021-09-13 PROCEDURE — 99214 PR OFFICE/OUTPT VISIT, EST, LEVL IV, 30-39 MIN: ICD-10-PCS | Mod: S$PBB,,, | Performed by: INTERNAL MEDICINE

## 2021-09-13 PROCEDURE — 99999 PR PBB SHADOW E&M-EST. PATIENT-LVL IV: ICD-10-PCS | Mod: PBBFAC,,, | Performed by: INTERNAL MEDICINE

## 2021-09-13 PROCEDURE — 99999 PR PBB SHADOW E&M-EST. PATIENT-LVL IV: CPT | Mod: PBBFAC,,, | Performed by: INTERNAL MEDICINE

## 2021-09-13 PROCEDURE — 99214 OFFICE O/P EST MOD 30 MIN: CPT | Mod: PBBFAC,PO | Performed by: INTERNAL MEDICINE

## 2021-09-13 RX ORDER — SODIUM CHLORIDE, SODIUM LACTATE, POTASSIUM CHLORIDE, CALCIUM CHLORIDE 600; 310; 30; 20 MG/100ML; MG/100ML; MG/100ML; MG/100ML
INJECTION, SOLUTION INTRAVENOUS CONTINUOUS
Status: CANCELLED | OUTPATIENT
Start: 2021-09-13

## 2021-09-13 RX ORDER — SODIUM CHLORIDE 0.9 % (FLUSH) 0.9 %
10 SYRINGE (ML) INJECTION
Status: CANCELLED | OUTPATIENT
Start: 2021-09-13

## 2021-09-20 ENCOUNTER — OFFICE VISIT (OUTPATIENT)
Dept: FAMILY MEDICINE | Facility: CLINIC | Age: 66
End: 2021-09-20
Payer: MEDICARE

## 2021-09-20 VITALS
DIASTOLIC BLOOD PRESSURE: 76 MMHG | OXYGEN SATURATION: 98 % | RESPIRATION RATE: 12 BRPM | BODY MASS INDEX: 30.98 KG/M2 | HEIGHT: 65 IN | TEMPERATURE: 98 F | SYSTOLIC BLOOD PRESSURE: 124 MMHG | HEART RATE: 67 BPM | WEIGHT: 185.94 LBS

## 2021-09-20 DIAGNOSIS — E11.8 DIABETES MELLITUS TYPE 2 WITH COMPLICATIONS: Primary | ICD-10-CM

## 2021-09-20 PROCEDURE — 99213 OFFICE O/P EST LOW 20 MIN: CPT | Mod: S$GLB,,, | Performed by: FAMILY MEDICINE

## 2021-09-20 PROCEDURE — 99213 PR OFFICE/OUTPT VISIT, EST, LEVL III, 20-29 MIN: ICD-10-PCS | Mod: S$GLB,,, | Performed by: FAMILY MEDICINE

## 2021-09-20 RX ORDER — SULFAMETHOXAZOLE AND TRIMETHOPRIM 800; 160 MG/1; MG/1
1 TABLET ORAL 2 TIMES DAILY
Qty: 20 TABLET | Refills: 0 | Status: SHIPPED | OUTPATIENT
Start: 2021-09-20 | End: 2021-09-30

## 2021-09-27 ENCOUNTER — LAB VISIT (OUTPATIENT)
Dept: FAMILY MEDICINE | Facility: CLINIC | Age: 66
End: 2021-09-27
Payer: MEDICARE

## 2021-09-27 DIAGNOSIS — Z01.818 PRE-OP TESTING: ICD-10-CM

## 2021-09-27 PROCEDURE — U0005 INFEC AGEN DETEC AMPLI PROBE: HCPCS | Performed by: SURGERY

## 2021-09-27 PROCEDURE — U0003 INFECTIOUS AGENT DETECTION BY NUCLEIC ACID (DNA OR RNA); SEVERE ACUTE RESPIRATORY SYNDROME CORONAVIRUS 2 (SARS-COV-2) (CORONAVIRUS DISEASE [COVID-19]), AMPLIFIED PROBE TECHNIQUE, MAKING USE OF HIGH THROUGHPUT TECHNOLOGIES AS DESCRIBED BY CMS-2020-01-R: HCPCS | Performed by: SURGERY

## 2021-09-28 LAB
SARS-COV-2 RNA RESP QL NAA+PROBE: NOT DETECTED
SARS-COV-2- CYCLE NUMBER: NORMAL

## 2021-09-30 ENCOUNTER — ANESTHESIA EVENT (OUTPATIENT)
Dept: ENDOSCOPY | Facility: HOSPITAL | Age: 66
End: 2021-09-30
Payer: MEDICARE

## 2021-09-30 ENCOUNTER — ANESTHESIA (OUTPATIENT)
Dept: ENDOSCOPY | Facility: HOSPITAL | Age: 66
End: 2021-09-30
Payer: MEDICARE

## 2021-09-30 ENCOUNTER — HOSPITAL ENCOUNTER (OUTPATIENT)
Facility: HOSPITAL | Age: 66
Discharge: HOME | End: 2021-09-30
Attending: SURGERY | Admitting: SURGERY
Payer: MEDICARE

## 2021-09-30 VITALS
RESPIRATION RATE: 18 BRPM | HEIGHT: 66 IN | OXYGEN SATURATION: 97 % | BODY MASS INDEX: 29.57 KG/M2 | DIASTOLIC BLOOD PRESSURE: 78 MMHG | SYSTOLIC BLOOD PRESSURE: 133 MMHG | WEIGHT: 184 LBS | TEMPERATURE: 98 F | HEART RATE: 68 BPM

## 2021-09-30 DIAGNOSIS — Z12.11 SCREEN FOR COLON CANCER: ICD-10-CM

## 2021-09-30 LAB — GLUCOSE SERPL-MCNC: 125 MG/DL (ref 70–110)

## 2021-09-30 PROCEDURE — 25000003 PHARM REV CODE 250: Mod: PO | Performed by: NURSE ANESTHETIST, CERTIFIED REGISTERED

## 2021-09-30 PROCEDURE — 37000008 HC ANESTHESIA 1ST 15 MINUTES: Mod: PO | Performed by: SURGERY

## 2021-09-30 PROCEDURE — D9220A PRA ANESTHESIA: Mod: ANES,,, | Performed by: ANESTHESIOLOGY

## 2021-09-30 PROCEDURE — D9220A PRA ANESTHESIA: ICD-10-PCS | Mod: ANES,,, | Performed by: ANESTHESIOLOGY

## 2021-09-30 PROCEDURE — D9220A PRA ANESTHESIA: ICD-10-PCS | Mod: CRNA,,, | Performed by: NURSE ANESTHETIST, CERTIFIED REGISTERED

## 2021-09-30 PROCEDURE — 45398 COLONOSCOPY W/BAND LIGATION: CPT | Mod: PT,,, | Performed by: SURGERY

## 2021-09-30 PROCEDURE — 63600175 PHARM REV CODE 636 W HCPCS: Mod: PO | Performed by: NURSE ANESTHETIST, CERTIFIED REGISTERED

## 2021-09-30 PROCEDURE — 45398: ICD-10-PCS | Mod: PT,,, | Performed by: SURGERY

## 2021-09-30 PROCEDURE — 45398 COLONOSCOPY W/BAND LIGATION: CPT | Mod: PO | Performed by: SURGERY

## 2021-09-30 PROCEDURE — 82962 GLUCOSE BLOOD TEST: CPT | Mod: PO | Performed by: SURGERY

## 2021-09-30 PROCEDURE — D9220A PRA ANESTHESIA: Mod: CRNA,,, | Performed by: NURSE ANESTHETIST, CERTIFIED REGISTERED

## 2021-09-30 PROCEDURE — 27201022: Mod: PO | Performed by: SURGERY

## 2021-09-30 PROCEDURE — 37000009 HC ANESTHESIA EA ADD 15 MINS: Mod: PO | Performed by: SURGERY

## 2021-09-30 PROCEDURE — 63600175 PHARM REV CODE 636 W HCPCS: Mod: PO | Performed by: SURGERY

## 2021-09-30 RX ORDER — LIDOCAINE HYDROCHLORIDE 20 MG/ML
INJECTION INTRAVENOUS
Status: DISCONTINUED | OUTPATIENT
Start: 2021-09-30 | End: 2021-09-30

## 2021-09-30 RX ORDER — SODIUM CHLORIDE, SODIUM LACTATE, POTASSIUM CHLORIDE, CALCIUM CHLORIDE 600; 310; 30; 20 MG/100ML; MG/100ML; MG/100ML; MG/100ML
INJECTION, SOLUTION INTRAVENOUS CONTINUOUS
Status: DISCONTINUED | OUTPATIENT
Start: 2021-09-30 | End: 2021-09-30 | Stop reason: HOSPADM

## 2021-09-30 RX ORDER — SODIUM CHLORIDE 0.9 % (FLUSH) 0.9 %
10 SYRINGE (ML) INJECTION
Status: DISCONTINUED | OUTPATIENT
Start: 2021-09-30 | End: 2021-09-30 | Stop reason: HOSPADM

## 2021-09-30 RX ORDER — PROPOFOL 10 MG/ML
VIAL (ML) INTRAVENOUS
Status: DISCONTINUED | OUTPATIENT
Start: 2021-09-30 | End: 2021-09-30

## 2021-09-30 RX ORDER — PROPOFOL 10 MG/ML
VIAL (ML) INTRAVENOUS CONTINUOUS PRN
Status: DISCONTINUED | OUTPATIENT
Start: 2021-09-30 | End: 2021-09-30

## 2021-09-30 RX ADMIN — LIDOCAINE HYDROCHLORIDE 100 MG: 20 INJECTION, SOLUTION INTRAVENOUS at 07:09

## 2021-09-30 RX ADMIN — PROPOFOL 100 MG: 10 INJECTION, EMULSION INTRAVENOUS at 07:09

## 2021-09-30 RX ADMIN — SODIUM CHLORIDE, SODIUM LACTATE, POTASSIUM CHLORIDE, AND CALCIUM CHLORIDE: .6; .31; .03; .02 INJECTION, SOLUTION INTRAVENOUS at 06:09

## 2021-09-30 RX ADMIN — PROPOFOL 50 MG: 10 INJECTION, EMULSION INTRAVENOUS at 07:09

## 2021-09-30 RX ADMIN — PROPOFOL 150 MCG/KG/MIN: 10 INJECTION, EMULSION INTRAVENOUS at 07:09

## 2021-10-04 ENCOUNTER — TELEPHONE (OUTPATIENT)
Dept: SURGERY | Facility: CLINIC | Age: 66
End: 2021-10-04

## 2021-11-11 ENCOUNTER — OFFICE VISIT (OUTPATIENT)
Dept: SURGERY | Facility: CLINIC | Age: 66
End: 2021-11-11
Payer: MEDICARE

## 2021-11-11 VITALS
HEIGHT: 66 IN | TEMPERATURE: 98 F | DIASTOLIC BLOOD PRESSURE: 82 MMHG | SYSTOLIC BLOOD PRESSURE: 136 MMHG | RESPIRATION RATE: 16 BRPM | BODY MASS INDEX: 30.29 KG/M2 | HEART RATE: 67 BPM | WEIGHT: 188.5 LBS

## 2021-11-11 DIAGNOSIS — K64.9 BLEEDING HEMORRHOIDS: Primary | ICD-10-CM

## 2021-11-11 PROCEDURE — 99999 PR PBB SHADOW E&M-EST. PATIENT-LVL IV: ICD-10-PCS | Mod: PBBFAC,,, | Performed by: SURGERY

## 2021-11-11 PROCEDURE — 99213 PR OFFICE/OUTPT VISIT, EST, LEVL III, 20-29 MIN: ICD-10-PCS | Mod: S$PBB,25,, | Performed by: SURGERY

## 2021-11-11 PROCEDURE — 99213 OFFICE O/P EST LOW 20 MIN: CPT | Mod: S$PBB,25,, | Performed by: SURGERY

## 2021-11-11 PROCEDURE — 99999 PR PBB SHADOW E&M-EST. PATIENT-LVL IV: CPT | Mod: PBBFAC,,, | Performed by: SURGERY

## 2021-11-11 PROCEDURE — 46600 DIAGNOSTIC ANOSCOPY SPX: CPT | Mod: PBBFAC,PO | Performed by: SURGERY

## 2021-11-11 PROCEDURE — 99214 OFFICE O/P EST MOD 30 MIN: CPT | Mod: PBBFAC,PO | Performed by: SURGERY

## 2021-11-11 PROCEDURE — 46600 PR DIAG2STIC A2SCOPY: ICD-10-PCS | Mod: S$PBB,,, | Performed by: SURGERY

## 2021-11-11 PROCEDURE — 46600 DIAGNOSTIC ANOSCOPY SPX: CPT | Mod: S$PBB,,, | Performed by: SURGERY

## 2021-12-01 RX ORDER — METOPROLOL SUCCINATE 25 MG/1
25 TABLET, EXTENDED RELEASE ORAL 2 TIMES DAILY
Qty: 180 TABLET | Refills: 4 | Status: SHIPPED | OUTPATIENT
Start: 2021-12-01 | End: 2022-12-12

## 2021-12-11 RX ORDER — ROPINIROLE 0.5 MG/1
TABLET, FILM COATED ORAL
Qty: 90 TABLET | Refills: 1 | Status: SHIPPED | OUTPATIENT
Start: 2021-12-11 | End: 2022-06-08

## 2021-12-17 ENCOUNTER — IMMUNIZATION (OUTPATIENT)
Dept: FAMILY MEDICINE | Facility: CLINIC | Age: 66
End: 2021-12-17
Payer: MEDICARE

## 2021-12-17 DIAGNOSIS — Z23 NEED FOR VACCINATION: Primary | ICD-10-CM

## 2021-12-17 PROCEDURE — 0004A COVID-19, MRNA, LNP-S, PF, 30 MCG/0.3 ML DOSE VACCINE: CPT | Mod: PBBFAC,PO

## 2021-12-30 ENCOUNTER — IMMUNIZATION (OUTPATIENT)
Dept: PHARMACY | Facility: CLINIC | Age: 66
End: 2021-12-30
Payer: MEDICARE

## 2022-01-04 RX ORDER — GLIMEPIRIDE 2 MG/1
TABLET ORAL
Qty: 90 TABLET | Refills: 0 | Status: SHIPPED | OUTPATIENT
Start: 2022-01-04 | End: 2022-03-22

## 2022-01-04 NOTE — TELEPHONE ENCOUNTER
No new care gaps identified.  Powered by [employer]. Reference number: [id number].   1/04/2022 10:11:43 AM CST

## 2022-01-04 NOTE — TELEPHONE ENCOUNTER
Refill Authorization Note   Trevor Johnston  is requesting a refill authorization.  Brief Assessment and Rationale for Refill:  Approve     Medication Therapy Plan:       Medication Reconciliation Completed: No   Comments:   --->Care Gap information included below if applicable.       Requested Prescriptions   Pending Prescriptions Disp Refills    glimepiride (AMARYL) 2 MG tablet [Pharmacy Med Name: GLIMEPIRIDE TABS 2MG] 90 tablet 0     Sig: TAKE 1 TABLET DAILY WITH BREAKFAST       Endocrinology:  Diabetes - Sulfonylureas Passed - 1/4/2022 10:11 AM        Passed - Patient is at least 18 years old        Passed - Valid encounter within last 15 months     Recent Visits  Date Type Provider Dept   09/20/21 Office Visit Blanca Doe MD [facility]   08/12/21 Office Visit Blanca Doe MD [facility]   01/28/21 Office Visit Blanca Doe MD [facility]   07/14/20 Office Visit Blanca Doe MD [facility]   02/18/20 Office Visit Blanca Doe MD [facility]   01/30/20 Office Visit Blanca Doe MD [facility]   Showing recent visits within past 720 days and meeting all other requirements  Future Appointments  No visits were found meeting these conditions.  Showing future appointments within next 150 days and meeting all other requirements      Future Appointments              In 2 months [facility]    In 2 months LAB, [facility] - Lab, [place]    In 2 months URINE [facility] - [facility], [facility]    In 2 months Blanca Doe MD [place] [facility]    In 3 months MD [facility] - [facility]                Passed - HBA1C within 180 days     Lab Results   Component Value Date    HGBA1C 6.9 (H) 08/25/2021    HGBA1C 7.1 (H) 07/21/2021    HGBA1C 6.2 (H) 01/05/2021              Passed - Cr is 1.39 or below and within 360 days     Lab Results   Component Value Date     CREATININE 0.82 03/17/2021    CREATININE 0.9 01/05/2021    CREATININE 0.8 06/26/2020              Passed - eGFR is 59 or above and within 360 days     Lab Results   Component Value Date    EGFRNONAA >60 03/17/2021    EGFRNONAA >60.0 01/05/2021    EGFRNONAA >60.0 06/26/2020                    Appointments  past 12m or future 3m with PCP    Date Provider   Last Visit   9/20/2021 Blanca Doe MD   Next Visit   3/28/2022 Blanca Doe MD   ED visits in past 90 days: 0     Note composed:1:37 PM 01/04/2022

## 2022-03-15 ENCOUNTER — CLINICAL SUPPORT (OUTPATIENT)
Dept: CARDIOLOGY | Facility: HOSPITAL | Age: 67
End: 2022-03-15
Attending: INTERNAL MEDICINE
Payer: MEDICARE

## 2022-03-15 VITALS — HEIGHT: 66 IN | WEIGHT: 188 LBS | BODY MASS INDEX: 30.22 KG/M2

## 2022-03-15 DIAGNOSIS — I48.0 PAROXYSMAL ATRIAL FIBRILLATION: ICD-10-CM

## 2022-03-15 PROCEDURE — 93306 TTE W/DOPPLER COMPLETE: CPT | Mod: PO

## 2022-03-15 PROCEDURE — 93306 ECHO (CUPID ONLY): ICD-10-PCS | Mod: 26,,, | Performed by: INTERNAL MEDICINE

## 2022-03-15 PROCEDURE — 93306 TTE W/DOPPLER COMPLETE: CPT | Mod: 26,,, | Performed by: INTERNAL MEDICINE

## 2022-03-16 LAB
ASCENDING AORTA: 2.86 CM
AV INDEX (PROSTH): 0.78
AV MEAN GRADIENT: 5 MMHG
AV PEAK GRADIENT: 10 MMHG
AV VALVE AREA: 2.84 CM2
AV VELOCITY RATIO: 0.71
BSA FOR ECHO PROCEDURE: 1.99 M2
CV ECHO LV RWT: 0.48 CM
DOP CALC AO PEAK VEL: 1.57 M/S
DOP CALC AO VTI: 28.05 CM
DOP CALC LVOT AREA: 3.6 CM2
DOP CALC LVOT DIAMETER: 2.15 CM
DOP CALC LVOT PEAK VEL: 1.12 M/S
DOP CALC LVOT STROKE VOLUME: 79.54 CM3
DOP CALCLVOT PEAK VEL VTI: 21.92 CM
E WAVE DECELERATION TIME: 280.22 MSEC
E/A RATIO: 0.9
E/E' RATIO: 10.38 M/S
ECHO LV POSTERIOR WALL: 1.07 CM (ref 0.6–1.1)
EJECTION FRACTION: 55 %
FRACTIONAL SHORTENING: 49 % (ref 28–44)
INTERVENTRICULAR SEPTUM: 0.87 CM (ref 0.6–1.1)
IVRT: 128.45 MSEC
LA MAJOR: 5.49 CM
LA MINOR: 4.95 CM
LA WIDTH: 3.15 CM
LEFT ATRIUM SIZE: 3.01 CM
LEFT ATRIUM VOLUME INDEX: 21.5 ML/M2
LEFT ATRIUM VOLUME: 41.96 CM3
LEFT INTERNAL DIMENSION IN SYSTOLE: 2.27 CM (ref 2.1–4)
LEFT VENTRICLE DIASTOLIC VOLUME INDEX: 45.69 ML/M2
LEFT VENTRICLE DIASTOLIC VOLUME: 89.09 ML
LEFT VENTRICLE MASS INDEX: 73 G/M2
LEFT VENTRICLE SYSTOLIC VOLUME INDEX: 9 ML/M2
LEFT VENTRICLE SYSTOLIC VOLUME: 17.51 ML
LEFT VENTRICULAR INTERNAL DIMENSION IN DIASTOLE: 4.43 CM (ref 3.5–6)
LEFT VENTRICULAR MASS: 143.32 G
LV LATERAL E/E' RATIO: 9.22 M/S
LV SEPTAL E/E' RATIO: 11.86 M/S
MV A" WAVE DURATION": 11.42 MSEC
MV PEAK A VEL: 0.92 M/S
MV PEAK E VEL: 0.83 M/S
MV STENOSIS PRESSURE HALF TIME: 81.26 MS
MV VALVE AREA P 1/2 METHOD: 2.71 CM2
PISA TR MAX VEL: 2.42 M/S
PULM VEIN S/D RATIO: 0.83
PV PEAK D VEL: 0.41 M/S
PV PEAK S VEL: 0.34 M/S
RA MAJOR: 4.97 CM
RA PRESSURE: 3 MMHG
RA WIDTH: 3.41 CM
RIGHT VENTRICULAR END-DIASTOLIC DIMENSION: 3.7 CM
RV TISSUE DOPPLER FREE WALL SYSTOLIC VELOCITY 1 (APICAL 4 CHAMBER VIEW): 14.01 CM/S
SINUS: 2.71 CM
STJ: 2.51 CM
TDI LATERAL: 0.09 M/S
TDI SEPTAL: 0.07 M/S
TDI: 0.08 M/S
TR MAX PG: 23 MMHG
TRICUSPID ANNULAR PLANE SYSTOLIC EXCURSION: 2.05 CM
TV REST PULMONARY ARTERY PRESSURE: 26 MMHG

## 2022-03-21 ENCOUNTER — LAB VISIT (OUTPATIENT)
Dept: LAB | Facility: HOSPITAL | Age: 67
End: 2022-03-21
Attending: FAMILY MEDICINE
Payer: MEDICARE

## 2022-03-21 DIAGNOSIS — E11.8 DIABETES MELLITUS TYPE 2 WITH COMPLICATIONS: ICD-10-CM

## 2022-03-21 LAB
ALBUMIN SERPL BCP-MCNC: 4 G/DL (ref 3.5–5.2)
ALP SERPL-CCNC: 90 U/L (ref 55–135)
ALT SERPL W/O P-5'-P-CCNC: 25 U/L (ref 10–44)
ANION GAP SERPL CALC-SCNC: 10 MMOL/L (ref 8–16)
AST SERPL-CCNC: 18 U/L (ref 10–40)
BASOPHILS # BLD AUTO: 0.09 K/UL (ref 0–0.2)
BASOPHILS NFR BLD: 1 % (ref 0–1.9)
BILIRUB SERPL-MCNC: 0.3 MG/DL (ref 0.1–1)
BUN SERPL-MCNC: 13 MG/DL (ref 8–23)
CALCIUM SERPL-MCNC: 10.1 MG/DL (ref 8.7–10.5)
CHLORIDE SERPL-SCNC: 108 MMOL/L (ref 95–110)
CHOLEST SERPL-MCNC: 121 MG/DL (ref 120–199)
CHOLEST/HDLC SERPL: 4 {RATIO} (ref 2–5)
CO2 SERPL-SCNC: 25 MMOL/L (ref 23–29)
CREAT SERPL-MCNC: 0.8 MG/DL (ref 0.5–1.4)
DIFFERENTIAL METHOD: ABNORMAL
EOSINOPHIL # BLD AUTO: 0.3 K/UL (ref 0–0.5)
EOSINOPHIL NFR BLD: 3.2 % (ref 0–8)
ERYTHROCYTE [DISTWIDTH] IN BLOOD BY AUTOMATED COUNT: 13.3 % (ref 11.5–14.5)
EST. GFR  (AFRICAN AMERICAN): >60 ML/MIN/1.73 M^2
EST. GFR  (NON AFRICAN AMERICAN): >60 ML/MIN/1.73 M^2
ESTIMATED AVG GLUCOSE: 126 MG/DL (ref 68–131)
GLUCOSE SERPL-MCNC: 115 MG/DL (ref 70–110)
HBA1C MFR BLD: 6 % (ref 4–5.6)
HCT VFR BLD AUTO: 39.5 % (ref 40–54)
HDLC SERPL-MCNC: 30 MG/DL (ref 40–75)
HDLC SERPL: 24.8 % (ref 20–50)
HGB BLD-MCNC: 12.8 G/DL (ref 14–18)
IMM GRANULOCYTES # BLD AUTO: 0.03 K/UL (ref 0–0.04)
IMM GRANULOCYTES NFR BLD AUTO: 0.3 % (ref 0–0.5)
LDLC SERPL CALC-MCNC: 52.8 MG/DL (ref 63–159)
LYMPHOCYTES # BLD AUTO: 2.2 K/UL (ref 1–4.8)
LYMPHOCYTES NFR BLD: 25.8 % (ref 18–48)
MCH RBC QN AUTO: 30.2 PG (ref 27–31)
MCHC RBC AUTO-ENTMCNC: 32.4 G/DL (ref 32–36)
MCV RBC AUTO: 93 FL (ref 82–98)
MONOCYTES # BLD AUTO: 0.8 K/UL (ref 0.3–1)
MONOCYTES NFR BLD: 9.6 % (ref 4–15)
NEUTROPHILS # BLD AUTO: 5.2 K/UL (ref 1.8–7.7)
NEUTROPHILS NFR BLD: 60.1 % (ref 38–73)
NONHDLC SERPL-MCNC: 91 MG/DL
NRBC BLD-RTO: 0 /100 WBC
PLATELET # BLD AUTO: 275 K/UL (ref 150–450)
PMV BLD AUTO: 11 FL (ref 9.2–12.9)
POTASSIUM SERPL-SCNC: 4.4 MMOL/L (ref 3.5–5.1)
PROT SERPL-MCNC: 7.5 G/DL (ref 6–8.4)
RBC # BLD AUTO: 4.24 M/UL (ref 4.6–6.2)
SODIUM SERPL-SCNC: 143 MMOL/L (ref 136–145)
TRIGL SERPL-MCNC: 191 MG/DL (ref 30–150)
TSH SERPL DL<=0.005 MIU/L-ACNC: 2.45 UIU/ML (ref 0.4–4)
WBC # BLD AUTO: 8.62 K/UL (ref 3.9–12.7)

## 2022-03-21 PROCEDURE — 85025 COMPLETE CBC W/AUTO DIFF WBC: CPT | Performed by: FAMILY MEDICINE

## 2022-03-21 PROCEDURE — 36415 COLL VENOUS BLD VENIPUNCTURE: CPT | Mod: PO | Performed by: FAMILY MEDICINE

## 2022-03-21 PROCEDURE — 84443 ASSAY THYROID STIM HORMONE: CPT | Performed by: FAMILY MEDICINE

## 2022-03-21 PROCEDURE — 83036 HEMOGLOBIN GLYCOSYLATED A1C: CPT | Performed by: FAMILY MEDICINE

## 2022-03-21 PROCEDURE — 80053 COMPREHEN METABOLIC PANEL: CPT | Performed by: FAMILY MEDICINE

## 2022-03-21 PROCEDURE — 80061 LIPID PANEL: CPT | Performed by: FAMILY MEDICINE

## 2022-03-26 ENCOUNTER — PATIENT MESSAGE (OUTPATIENT)
Dept: FAMILY MEDICINE | Facility: CLINIC | Age: 67
End: 2022-03-26
Payer: MEDICARE

## 2022-03-28 ENCOUNTER — OFFICE VISIT (OUTPATIENT)
Dept: FAMILY MEDICINE | Facility: CLINIC | Age: 67
End: 2022-03-28
Payer: MEDICARE

## 2022-03-28 ENCOUNTER — TELEPHONE (OUTPATIENT)
Dept: SURGERY | Facility: CLINIC | Age: 67
End: 2022-03-28
Payer: MEDICARE

## 2022-03-28 VITALS
HEIGHT: 66 IN | RESPIRATION RATE: 20 BRPM | DIASTOLIC BLOOD PRESSURE: 68 MMHG | WEIGHT: 183.31 LBS | HEART RATE: 75 BPM | BODY MASS INDEX: 29.46 KG/M2 | SYSTOLIC BLOOD PRESSURE: 124 MMHG | OXYGEN SATURATION: 95 % | TEMPERATURE: 98 F

## 2022-03-28 DIAGNOSIS — I48.0 PAROXYSMAL ATRIAL FIBRILLATION: ICD-10-CM

## 2022-03-28 DIAGNOSIS — D53.9 NUTRITIONAL ANEMIA, UNSPECIFIED: ICD-10-CM

## 2022-03-28 DIAGNOSIS — E79.0 HYPERURICEMIA: ICD-10-CM

## 2022-03-28 DIAGNOSIS — E78.5 HYPERLIPIDEMIA ASSOCIATED WITH TYPE 2 DIABETES MELLITUS: ICD-10-CM

## 2022-03-28 DIAGNOSIS — D64.9 ANEMIA, UNSPECIFIED TYPE: ICD-10-CM

## 2022-03-28 DIAGNOSIS — E11.69 HYPERLIPIDEMIA ASSOCIATED WITH TYPE 2 DIABETES MELLITUS: ICD-10-CM

## 2022-03-28 DIAGNOSIS — E11.59 HYPERTENSION ASSOCIATED WITH DIABETES: ICD-10-CM

## 2022-03-28 DIAGNOSIS — I15.2 HYPERTENSION ASSOCIATED WITH DIABETES: ICD-10-CM

## 2022-03-28 DIAGNOSIS — E11.8 DIABETES MELLITUS TYPE 2 WITH COMPLICATIONS: Primary | ICD-10-CM

## 2022-03-28 LAB
BASOPHILS # BLD AUTO: 0.08 K/UL (ref 0–0.2)
BASOPHILS NFR BLD: 1 % (ref 0–1.9)
DIFFERENTIAL METHOD: ABNORMAL
EOSINOPHIL # BLD AUTO: 0.2 K/UL (ref 0–0.5)
EOSINOPHIL NFR BLD: 2.6 % (ref 0–8)
ERYTHROCYTE [DISTWIDTH] IN BLOOD BY AUTOMATED COUNT: 13.2 % (ref 11.5–14.5)
FERRITIN SERPL-MCNC: 11 NG/ML (ref 20–300)
FOLATE SERPL-MCNC: 8.7 NG/ML (ref 4–24)
HCT VFR BLD AUTO: 40.1 % (ref 40–54)
HGB BLD-MCNC: 13 G/DL (ref 14–18)
IMM GRANULOCYTES # BLD AUTO: 0.03 K/UL (ref 0–0.04)
IMM GRANULOCYTES NFR BLD AUTO: 0.4 % (ref 0–0.5)
IRON SERPL-MCNC: 45 UG/DL (ref 45–160)
LYMPHOCYTES # BLD AUTO: 1.6 K/UL (ref 1–4.8)
LYMPHOCYTES NFR BLD: 19 % (ref 18–48)
MCH RBC QN AUTO: 30.5 PG (ref 27–31)
MCHC RBC AUTO-ENTMCNC: 32.4 G/DL (ref 32–36)
MCV RBC AUTO: 94 FL (ref 82–98)
MONOCYTES # BLD AUTO: 0.7 K/UL (ref 0.3–1)
MONOCYTES NFR BLD: 8.1 % (ref 4–15)
NEUTROPHILS # BLD AUTO: 5.8 K/UL (ref 1.8–7.7)
NEUTROPHILS NFR BLD: 68.9 % (ref 38–73)
NRBC BLD-RTO: 0 /100 WBC
PLATELET # BLD AUTO: 292 K/UL (ref 150–450)
PMV BLD AUTO: 10.4 FL (ref 9.2–12.9)
RBC # BLD AUTO: 4.26 M/UL (ref 4.6–6.2)
SATURATED IRON: 9 % (ref 20–50)
TOTAL IRON BINDING CAPACITY: 517 UG/DL (ref 250–450)
TRANSFERRIN SERPL-MCNC: 349 MG/DL (ref 200–375)
URATE SERPL-MCNC: 3.3 MG/DL (ref 3.4–7)
VIT B12 SERPL-MCNC: 416 PG/ML (ref 210–950)
WBC # BLD AUTO: 8.35 K/UL (ref 3.9–12.7)

## 2022-03-28 PROCEDURE — 82746 ASSAY OF FOLIC ACID SERUM: CPT | Performed by: FAMILY MEDICINE

## 2022-03-28 PROCEDURE — 82607 VITAMIN B-12: CPT | Performed by: FAMILY MEDICINE

## 2022-03-28 PROCEDURE — 85025 COMPLETE CBC W/AUTO DIFF WBC: CPT | Performed by: FAMILY MEDICINE

## 2022-03-28 PROCEDURE — 82728 ASSAY OF FERRITIN: CPT | Performed by: FAMILY MEDICINE

## 2022-03-28 PROCEDURE — 84466 ASSAY OF TRANSFERRIN: CPT | Performed by: FAMILY MEDICINE

## 2022-03-28 PROCEDURE — 99214 PR OFFICE/OUTPT VISIT, EST, LEVL IV, 30-39 MIN: ICD-10-PCS | Mod: S$GLB,,, | Performed by: FAMILY MEDICINE

## 2022-03-28 PROCEDURE — 84550 ASSAY OF BLOOD/URIC ACID: CPT | Performed by: FAMILY MEDICINE

## 2022-03-28 PROCEDURE — 99214 OFFICE O/P EST MOD 30 MIN: CPT | Mod: S$GLB,,, | Performed by: FAMILY MEDICINE

## 2022-03-28 RX ORDER — GABAPENTIN 300 MG/1
300 CAPSULE ORAL NIGHTLY
Qty: 90 CAPSULE | Refills: 1 | Status: SHIPPED | OUTPATIENT
Start: 2022-03-28 | End: 2022-09-23

## 2022-03-28 RX ORDER — CLOTRIMAZOLE 1 %
CREAM (GRAM) TOPICAL 2 TIMES DAILY
Qty: 113 G | Refills: 1 | Status: SHIPPED | OUTPATIENT
Start: 2022-03-28 | End: 2022-09-29

## 2022-03-28 RX ORDER — GLIMEPIRIDE 1 MG/1
1 TABLET ORAL
Qty: 90 TABLET | Refills: 1 | Status: SHIPPED | OUTPATIENT
Start: 2022-03-28 | End: 2022-06-27

## 2022-03-28 NOTE — PROGRESS NOTES
Subjective:       Patient ID: Trevor Johnston is a 66 y.o. male.    Chief Complaint: Follow-up    HPI   The patient is a 66-year-old who is here today for his 6 month follow-up.  Today we discussed the followin)  Rectal bleeding.  He had hemorrhoids banded in 2021 by Dr. Mcgraw.  Since that time, he has continued to have bright red rectal bleeding with bowel movements.  Dr. Mcgraw had told him to schedule appointment for a repeat procedure but he delayed doing it due to the upcoming holidays.  This past Friday, his rectal bleeding was worse than normal.  His bleeding was a constant drip and he spent over an hour to get it to stop bleeding.  The next 2 days, he had a weird feeling and felt a bit dazed and weak.  He denies any abdominal pain  2) diabetes.  His recent A1c was 6.0 down from prior value of 6.9.  He is currently taking Amaryl 2 mg once a day and Glucophage  mg twice a day.  He denies any hypoglycemic episodes  3) hyperlipidemia.  He is doing well with his Lipitor.  His recent total cholesterol was 121, his triglycerides were 191 and his HDL was 52   4)  Hypertension.  Today's blood pressure is 124/68.  He is doing well with the Avapro and Toprol   5) anemia.  He has been having mild anemia.  His labs on  showed an H&H of 12.8 and 39.5   6)  PAF.  He is currently taking flecainide, Toprol and Xarelto.  He is seeing Dr. Isabel on .  He denies any palpitations but never had an awareness of this  7) hyperuricemia with history of gout.  He has not had a gouty flare.  He is currently not taking his allopurinol  8) foot issues.  He has noticed in the evening hours when he gets in his recliner that he gets numbness and tingling in his feet.  He wonders if this is neuropathy.      Review of Systems   Constitutional: Negative for appetite change, chills, diaphoresis, fatigue, fever and unexpected weight change.   HENT: Negative for congestion, ear pain, postnasal drip,  rhinorrhea, sinus pressure, sneezing, sore throat and trouble swallowing.    Eyes: Negative for pain, discharge and visual disturbance.   Respiratory: Negative for cough, chest tightness, shortness of breath and wheezing.    Cardiovascular: Negative for chest pain, palpitations and leg swelling.   Gastrointestinal: Negative for abdominal distention, abdominal pain, blood in stool, constipation, diarrhea, nausea and vomiting.        +rectal bleeding per HPI   Skin: Positive for rash (in groin area).       Objective:      Physical Exam  Constitutional:       General: He is not in acute distress.     Appearance: Normal appearance. He is well-developed.   HENT:      Head: Normocephalic and atraumatic.      Right Ear: Hearing, tympanic membrane, ear canal and external ear normal.      Left Ear: Hearing, tympanic membrane, ear canal and external ear normal.      Nose: Nose normal.      Mouth/Throat:      Mouth: No oral lesions.      Pharynx: No oropharyngeal exudate or posterior oropharyngeal erythema.   Eyes:      General: Lids are normal. No scleral icterus.     Extraocular Movements: Extraocular movements intact.      Conjunctiva/sclera: Conjunctivae normal.      Pupils: Pupils are equal, round, and reactive to light.   Neck:      Thyroid: No thyroid mass or thyromegaly.      Vascular: No carotid bruit.   Cardiovascular:      Rate and Rhythm: Normal rate and regular rhythm.  No extrasystoles are present.     Chest Wall: PMI is not displaced.      Pulses:           Dorsalis pedis pulses are 2+ on the right side and 2+ on the left side.        Posterior tibial pulses are 2+ on the right side and 2+ on the left side.      Heart sounds: Normal heart sounds. No murmur heard.    No gallop.   Pulmonary:      Effort: Pulmonary effort is normal. No accessory muscle usage or respiratory distress.      Breath sounds: Normal breath sounds.   Chest:   Breasts:      Right: No supraclavicular adenopathy.      Left: No supraclavicular  "adenopathy.       Abdominal:      General: Bowel sounds are normal. There is no abdominal bruit.      Palpations: Abdomen is soft.      Tenderness: There is no abdominal tenderness. There is no rebound.   Genitourinary:     Pubic Area: Rash (In the inguinal creases bilaterally) present.      Rectum: External hemorrhoid present.   Musculoskeletal:      Cervical back: Normal range of motion and neck supple.      Right foot: No deformity.      Left foot: No deformity.   Feet:      Right foot:      Protective Sensation: 10 sites tested. 10 sites sensed.      Skin integrity: Warmth present. No ulcer or callus.      Left foot:      Protective Sensation: 10 sites tested. 10 sites sensed.      Skin integrity: Warmth present. No ulcer or callus.   Lymphadenopathy:      Head:      Right side of head: No submental or submandibular adenopathy.      Left side of head: No submental or submandibular adenopathy.      Cervical:      Right cervical: No superficial, deep or posterior cervical adenopathy.     Left cervical: No superficial, deep or posterior cervical adenopathy.      Upper Body:      Right upper body: No supraclavicular adenopathy.      Left upper body: No supraclavicular adenopathy.   Skin:     General: Skin is warm and dry.   Neurological:      Mental Status: He is alert and oriented to person, place, and time.       Blood pressure 124/68, pulse 75, temperature 98.4 °F (36.9 °C), resp. rate 20, height 5' 6" (1.676 m), weight 83.2 kg (183 lb 5 oz), SpO2 95 %.Body mass index is 29.59 kg/m².            A/P:  1)  Rectal bleeding.  Persistent.  Likely due to hemorrhoids.  We will check a CBC with iron studies today given his recent event on Friday.  Once those are back, we will check to Dr. Mcgraw for follow-up    2) diabetes.  Well controlled in probably over controlled.  We are going to decrease his Amaryl to 1 mg once a day and Glucophage  mg twice a day.  We will check an A1c in 3 months  3) hyperlipidemia.  Well " controlled.  Continue with Lipitor.  We will recheck labs 1 year   4)  Hypertension.  Well controlled.  Continue with  Avapro and Toprol   5) anemia.  Persistent.  With his recent bout of increased rectal bleeding, we are going to check a CBC with an anemia panel  6)  PAF.  Asymptomatic.  Continue with flecainide, Toprol and Xarelto.  Follow up with Cardiology as planned  7) hyperuricemia with history of gout.  Asymptomatic.  We will check a uric acid level  8) intertrigo.  We did discuss vinegar soaks.  I am also going to prescribe Lotrimin  9) paresthesias in the feet.  New.  This is likely related to his diabetes.  We are going to try gabapentin 300 mg to see how he does.  We could adjust the dose up or down if needed.      We will check an A1c in 3 months.  If this is normal, I will see him back in 6 months or sooner if needed

## 2022-03-28 NOTE — TELEPHONE ENCOUNTER
I called patient and scheduled him on Thursday, 4/7/22 @ 8:30am in [place] to see Dr. Mcgraw.  Jaciel

## 2022-03-28 NOTE — PATIENT INSTRUCTIONS
Cool blow dryer after shower/bath or when wet     Vinegar soaks four times a day. For the vinegar soaks, use 1 cup of white distilled vinegar and 4 cups of water. Apply this mixture with a washcloth twice a day.      After the vinegar soaks use the prescribed cream

## 2022-04-01 DIAGNOSIS — N20.0 KIDNEY STONES: ICD-10-CM

## 2022-04-01 NOTE — TELEPHONE ENCOUNTER
Refill Routing Note   Medication(s) are not appropriate for processing by [facility] for the following reason(s):      - Drug-Disease Interaction (allopurinoL and GORGE (acute kidney injury); Type 2 diabetes mellitus with microalbuminuria, without long-term current use of insulin)    [facility] action(s):  Defer Medication-related problems identified: Drug-disease interaction        --->Care Gap information included in message below if applicable.   Medication reconciliation completed: No   Automatic Epic Generated Protocol Data:        Requested Prescriptions   Pending Prescriptions Disp Refills    allopurinoL (ZYLOPRIM) 300 MG tablet [Pharmacy Med Name: ALLOPURINOL TABS 300MG] 90 tablet 3     Sig: Take 1 tablet (300 mg total) by mouth once daily.        Endocrinology:  Gout Agents - allopurinol Passed - 4/1/2022  2:44 AM        Passed - Patient is at least 18 years old        Passed - Valid encounter within last 15 months     Recent Visits  Date Type Provider Dept   03/28/22 Office Visit Blanca Doe MD [facility]   09/20/21 Office Visit Blanca Doe MD [facility]   08/12/21 Office Visit Blanca Doe MD [facility]   01/28/21 Office Visit Blanca Doe MD [facility]   07/14/20 Office Visit Blanca Doe MD [facility]   Showing recent visits within past 720 days and meeting all other requirements  Future Appointments  No visits were found meeting these conditions.  Showing future appointments within next 150 days and meeting all other requirements        Future Appointments                In 4 days Rodrigo Isabel MD [place] [facility] [place]    In 6 days Sb Mcgraw MD [place] [facility], [place]    In 2 months LAB, MANAN [place] [facility]    In 6 months Blanca Doe MD [place] [facility]             Passed - Matches previous order         Previous Authorizing Provider: Blanca Doe  MD (allopurinoL (ZYLOPRIM) 300 MG tablet)  Previous Pharmacy: [facility] - [place], [place] - [address]            Passed - No ED/Hospital visits since last PCP visit     Last PCP Visit: 3/28/2022 Last Admission: 9/30/2021 Last ED Visit: 2/11/2020          Passed - Uric Acid within 360 days       Uric Acid   Date Value Ref Range Status   03/28/2022 3.3 (L) 3.4 - 7.0 mg/dL Final   01/05/2021 3.6 3.4 - 7.0 mg/dL Final   09/27/2019 5.3 3.4 - 7.0 mg/dL Final              Passed - Cr is 1.39 or below and within 360 days       Lab Results   Component Value Date    CREATININE 0.8 03/21/2022    CREATININE 0.82 03/17/2021    CREATININE 0.9 01/05/2021              Passed - WBC within 360 days       WBC   Date Value Ref Range Status   03/28/2022 8.35 3.90 - 12.70 K/uL Final   03/21/2022 8.62 3.90 - 12.70 K/uL Final   01/05/2021 8.28 3.90 - 12.70 K/uL Final              Passed - RBC within 360 days       RBC   Date Value Ref Range Status   03/28/2022 4.26 (L) 4.60 - 6.20 M/uL Final   03/21/2022 4.24 (L) 4.60 - 6.20 M/uL Final   01/05/2021 4.29 (L) 4.60 - 6.20 M/uL Final              Passed - HGB within 360 days       Hemoglobin   Date Value Ref Range Status   03/28/2022 13.0 (L) 14.0 - 18.0 g/dL Final   03/21/2022 12.8 (L) 14.0 - 18.0 g/dL Final   01/05/2021 12.8 (L) 14.0 - 18.0 g/dL Final              Passed - HCT within 360 days       Hematocrit   Date Value Ref Range Status   03/28/2022 40.1 40.0 - 54.0 % Final   03/21/2022 39.5 (L) 40.0 - 54.0 % Final   01/05/2021 41.1 40.0 - 54.0 % Final              Passed - PLT within 360 days       Platelets   Date Value Ref Range Status   03/28/2022 292 150 - 450 K/uL Final   03/21/2022 275 150 - 450 K/uL Final   01/05/2021 247 150 - 350 K/uL Final              Passed - ALT is 131 or below and within 360 days       ALT   Date Value Ref Range Status   03/21/2022 25 10 - 44 U/L Final   01/05/2021 30 10 - 44 U/L Final   06/26/2020 26 10 - 44 U/L Final               Passed - AST is 119 or below and within 360 days       AST   Date Value Ref Range Status   03/21/2022 18 10 - 40 U/L Final   01/05/2021 27 10 - 40 U/L Final   06/26/2020 22 10 - 40 U/L Final              Passed - eGFR within 360 days     Lab Results   Component Value Date    EGFRNONAA >60.0 03/21/2022    EGFRNONAA >60 03/17/2021    EGFRNONAA >60.0 01/05/2021                       Appointments  past 12m or future 3m with PCP    Date Provider   Last Visit   3/28/2022 Blanca Doe MD   Next Visit   Visit date not found Blanca Doe MD   ED visits in past 90 days: 0        Note composed:5:26 PM 04/01/2022

## 2022-04-01 NOTE — TELEPHONE ENCOUNTER
No new care gaps identified.  Powered by [employer]. Reference number: [id number].   4/01/2022 2:45:20 AM CDT

## 2022-04-02 ENCOUNTER — PATIENT MESSAGE (OUTPATIENT)
Dept: FAMILY MEDICINE | Facility: CLINIC | Age: 67
End: 2022-04-02
Payer: MEDICARE

## 2022-04-02 RX ORDER — ALLOPURINOL 300 MG/1
300 TABLET ORAL DAILY
Qty: 90 TABLET | Refills: 0 | Status: SHIPPED | OUTPATIENT
Start: 2022-04-02 | End: 2022-07-01

## 2022-04-05 ENCOUNTER — OFFICE VISIT (OUTPATIENT)
Dept: CARDIOLOGY | Facility: CLINIC | Age: 67
End: 2022-04-05
Payer: MEDICARE

## 2022-04-05 VITALS
HEART RATE: 73 BPM | HEIGHT: 66 IN | BODY MASS INDEX: 29.97 KG/M2 | SYSTOLIC BLOOD PRESSURE: 128 MMHG | DIASTOLIC BLOOD PRESSURE: 77 MMHG | WEIGHT: 186.5 LBS

## 2022-04-05 DIAGNOSIS — I25.3 PFO WITH ATRIAL SEPTAL ANEURYSM: Primary | ICD-10-CM

## 2022-04-05 DIAGNOSIS — G47.33 OSA (OBSTRUCTIVE SLEEP APNEA): ICD-10-CM

## 2022-04-05 DIAGNOSIS — E11.29 TYPE 2 DIABETES MELLITUS WITH MICROALBUMINURIA, WITHOUT LONG-TERM CURRENT USE OF INSULIN: ICD-10-CM

## 2022-04-05 DIAGNOSIS — I10 PRIMARY HYPERTENSION: ICD-10-CM

## 2022-04-05 DIAGNOSIS — E78.2 MIXED HYPERLIPIDEMIA: ICD-10-CM

## 2022-04-05 DIAGNOSIS — I48.0 PAROXYSMAL ATRIAL FIBRILLATION: ICD-10-CM

## 2022-04-05 DIAGNOSIS — R80.9 TYPE 2 DIABETES MELLITUS WITH MICROALBUMINURIA, WITHOUT LONG-TERM CURRENT USE OF INSULIN: ICD-10-CM

## 2022-04-05 DIAGNOSIS — Q21.12 PFO WITH ATRIAL SEPTAL ANEURYSM: Primary | ICD-10-CM

## 2022-04-05 PROCEDURE — 99214 PR OFFICE/OUTPT VISIT, EST, LEVL IV, 30-39 MIN: ICD-10-PCS | Mod: S$PBB,,, | Performed by: INTERNAL MEDICINE

## 2022-04-05 PROCEDURE — 99215 OFFICE O/P EST HI 40 MIN: CPT | Mod: PBBFAC,PO | Performed by: INTERNAL MEDICINE

## 2022-04-05 PROCEDURE — 99999 PR PBB SHADOW E&M-EST. PATIENT-LVL V: ICD-10-PCS | Mod: PBBFAC,,, | Performed by: INTERNAL MEDICINE

## 2022-04-05 PROCEDURE — 99999 PR PBB SHADOW E&M-EST. PATIENT-LVL V: CPT | Mod: PBBFAC,,, | Performed by: INTERNAL MEDICINE

## 2022-04-05 PROCEDURE — 99214 OFFICE O/P EST MOD 30 MIN: CPT | Mod: S$PBB,,, | Performed by: INTERNAL MEDICINE

## 2022-04-05 NOTE — PROGRESS NOTES
Subjective:    Patient ID:  Trevor Johnston is a 66 y.o. male who presents for follow-up of No chief complaint on file.      HPI  Here for follow up of PAF (DCCV 11/20/asx)/small PFO. Remains very active no angina. Patient denies palpitations, syncope, presyncope, lightheadedness or dizziness.  Having difficulty with bleeding hemorrhoids, held NOAC for 3 days        Review of Systems   Constitutional: Negative for malaise/fatigue.   Eyes: Negative for blurred vision.   Cardiovascular: Negative for chest pain, claudication, cyanosis, dyspnea on exertion, irregular heartbeat, leg swelling, near-syncope, orthopnea, palpitations, paroxysmal nocturnal dyspnea and syncope.   Respiratory: Negative for cough and shortness of breath.    Hematologic/Lymphatic: Does not bruise/bleed easily.   Musculoskeletal: Negative for back pain, falls, joint pain, muscle cramps, muscle weakness and myalgias.   Gastrointestinal: Negative for abdominal pain, change in bowel habit, nausea and vomiting.   Genitourinary: Negative for urgency.   Neurological: Negative for dizziness, focal weakness and light-headedness.       Past Medical History:   Diagnosis Date    Anticoagulant long-term use     ASD (atrial septal defect)     noted on 11/20 ECHO    Diabetes mellitus, type 2     Fatty liver disease, nonalcoholic     noted on CT    H/O cardiovascular stress test     normal 6/20    Hyperlipidemia     Hypertension     Hyperuricemia     Kidney stones     multiple episodes - on allopurinol    Macular degeneration, right eye     ROBERT (obstructive sleep apnea)     does not use CPAP    PAF (paroxysmal atrial fibrillation)     s/p cardioversion 11/20- pt denies    RLS (restless legs syndrome)     Valvular heart disease     mod MR and mild TR on 6/20 echo     There are no hospital problems to display for this patient.       Objective:     Vitals:    04/05/22 1129   BP: 128/77   Pulse: 73        Physical Exam  Vitals and nursing note  reviewed.   Constitutional:       Appearance: He is well-developed.   HENT:      Head: Normocephalic and atraumatic.   Eyes:      Conjunctiva/sclera: Conjunctivae normal.      Right eye: No exudate.     Left eye: No exudate.  Neck:      Thyroid: No thyromegaly.      Vascular: Normal carotid pulses. No carotid bruit or JVD.   Cardiovascular:      Rate and Rhythm: Normal rate. Rhythm irregularly irregular.      Pulses: Intact distal pulses.           Carotid pulses are 2+ on the right side and 2+ on the left side.       Radial pulses are 2+ on the right side and 2+ on the left side.        Dorsalis pedis pulses are 2+ on the right side and 2+ on the left side.        Posterior tibial pulses are 2+ on the right side and 2+ on the left side.      Heart sounds: Normal heart sounds.   Pulmonary:      Effort: Pulmonary effort is normal.      Breath sounds: Normal breath sounds.   Abdominal:      General: Bowel sounds are normal.      Palpations: Abdomen is soft.   Musculoskeletal:         General: Normal range of motion.      Cervical back: Normal range of motion and neck supple.   Skin:     General: Skin is warm and dry.      Nails: There is no clubbing.   Neurological:      Mental Status: He is alert and oriented to person, place, and time.      Gait: Gait normal.   Psychiatric:         Speech: Speech normal.         Behavior: Behavior normal. Behavior is cooperative.         Thought Content: Thought content normal.         Judgment: Judgment normal.               ..    Chemistry        Component Value Date/Time     03/21/2022 0924    K 4.4 03/21/2022 0924     03/21/2022 0924    CO2 25 03/21/2022 0924    BUN 13 03/21/2022 0924    CREATININE 0.8 03/21/2022 0924     (H) 03/21/2022 0924        Component Value Date/Time    CALCIUM 10.1 03/21/2022 0924    ALKPHOS 90 03/21/2022 0924    AST 18 03/21/2022 0924    ALT 25 03/21/2022 0924    BILITOT 0.3 03/21/2022 0924    ESTGFRAFRICA >60.0 03/21/2022 0924     EGFRNONAA >60.0 03/21/2022 0924            ..  Lab Results   Component Value Date    CHOL 121 03/21/2022    CHOL 123 07/21/2021    CHOL 123 06/26/2020     Lab Results   Component Value Date    HDL 30 (L) 03/21/2022    HDL 37 (L) 07/21/2021    HDL 38 (L) 06/26/2020     Lab Results   Component Value Date    LDLCALC 52.8 (L) 03/21/2022    LDLCALC 61.0 (L) 07/21/2021    LDLCALC 55.6 (L) 06/26/2020     Lab Results   Component Value Date    TRIG 191 (H) 03/21/2022    TRIG 125 07/21/2021    TRIG 147 06/26/2020     Lab Results   Component Value Date    CHOLHDL 24.8 03/21/2022    CHOLHDL 30.1 07/21/2021    CHOLHDL 30.9 06/26/2020     ..  Lab Results   Component Value Date    WBC 8.35 03/28/2022    HGB 13.0 (L) 03/28/2022    HCT 40.1 03/28/2022    MCV 94 03/28/2022     03/28/2022       Test(s) Reviewed  I have reviewed the following in detail:  [] Stress test   [] Angiography   [x] Echocardiogram   [x] Labs   [] Other:       Assessment:         ICD-10-CM ICD-9-CM   1. PFO with atrial septal aneurysm  Q21.1 745.5   2. Paroxysmal atrial fibrillation  I48.0 427.31   3. Mixed hyperlipidemia  E78.2 272.2   4. Primary hypertension  I10 401.9   5. Type 2 diabetes mellitus with microalbuminuria, without long-term current use of insulin  E11.29 250.40    R80.9 791.0   6. ROBERT (obstructive sleep apnea)  G47.33 327.23     Active Problem List with Overview Notes    Diagnosis Date Noted    Screen for colon cancer 09/30/2021    PFO with atrial septal aneurysm 09/13/2021 11/20  SHAUN  · Trivial small patent foramen ovale with left-to-right shunt by color flow        Elevated PSA 03/17/2021    Calculus of kidney 07/15/2020    Kidney stone 03/05/2020    Obstructive uropathy 02/11/2020    GORGE (acute kidney injury) 02/11/2020    Obstructed, uropathy 02/11/2020    Hypertension     Diabetes mellitus, type 2     ROBERT (obstructive sleep apnea)     Paroxysmal atrial fibrillation 02/06/2020     dccv 11/20      Normocytic anemia  09/20/2018    Chronic gout without tophus 05/16/2018    Former cigarette smoker 05/16/2018    Other hemorrhoids 02/14/2018    Kidney stones 12/29/2017    Hypertension associated with diabetes 11/01/2017    BMI 28.0-28.9,adult 11/01/2017    Type 2 diabetes mellitus with microalbuminuria, without long-term current use of insulin 11/01/2017    Trigger finger of right thumb 10/09/2017    Hyperlipidemia 10/09/2017        Plan:           Return to clinic 9 months   Low level/low impact aerobic exercise 5x's/wk. Heart healthy diet and risk factor modification.    See labs and med orders.  Due to patient has asymptomatic atrial fibrillation with normal atrial size and normal biventricular function but now having iron deficiency anemia due to chronic hemorrhoidal bleeding despite banding have given the patient options of  1-continue on NOAC despite bleeding 9 deficiency.  Two-refer for Watchman implantation and then evaluate transcranial Doppler see if PFO needs to be closed.  Three-loop recorder implantation if no atrial fibrillation discontinue NOAC in again TCD to evaluate if PFO needs to be addressed.  After evaluating above patient has chosen option 3. Will plan on;  TCD  ILR  Start iron replacement.  Has follow-up with Colorectal surgery      Portions of this note may have been created with voice recognition software.  Grammatical, syntax and spelling errors may be inevitable.

## 2022-04-07 ENCOUNTER — OFFICE VISIT (OUTPATIENT)
Dept: SURGERY | Facility: CLINIC | Age: 67
End: 2022-04-07
Payer: MEDICARE

## 2022-04-07 VITALS
HEIGHT: 66 IN | TEMPERATURE: 99 F | BODY MASS INDEX: 29.69 KG/M2 | SYSTOLIC BLOOD PRESSURE: 122 MMHG | WEIGHT: 184.75 LBS | DIASTOLIC BLOOD PRESSURE: 76 MMHG | HEART RATE: 68 BPM

## 2022-04-07 DIAGNOSIS — K64.9 BLEEDING HEMORRHOIDS: Primary | ICD-10-CM

## 2022-04-07 PROCEDURE — 99214 OFFICE O/P EST MOD 30 MIN: CPT | Mod: PBBFAC,PO | Performed by: SURGERY

## 2022-04-07 PROCEDURE — 99999 PR PBB SHADOW E&M-EST. PATIENT-LVL IV: ICD-10-PCS | Mod: PBBFAC,,, | Performed by: SURGERY

## 2022-04-07 PROCEDURE — 46600 PR DIAG2STIC A2SCOPY: ICD-10-PCS | Mod: S$PBB,,, | Performed by: SURGERY

## 2022-04-07 PROCEDURE — 99213 OFFICE O/P EST LOW 20 MIN: CPT | Mod: 25,S$PBB,, | Performed by: SURGERY

## 2022-04-07 PROCEDURE — 46600 DIAGNOSTIC ANOSCOPY SPX: CPT | Mod: PBBFAC,PO | Performed by: SURGERY

## 2022-04-07 PROCEDURE — 46600 DIAGNOSTIC ANOSCOPY SPX: CPT | Mod: S$PBB,,, | Performed by: SURGERY

## 2022-04-07 PROCEDURE — 99213 PR OFFICE/OUTPT VISIT, EST, LEVL III, 20-29 MIN: ICD-10-PCS | Mod: 25,S$PBB,, | Performed by: SURGERY

## 2022-04-07 PROCEDURE — 99999 PR PBB SHADOW E&M-EST. PATIENT-LVL IV: CPT | Mod: PBBFAC,,, | Performed by: SURGERY

## 2022-04-07 NOTE — PROGRESS NOTES
65 yo M whom I am familiar with.  He had cscope with banding in 9/21.  Notes improvement in bleeding with that.   He notes about 2 weeks ago had significant bleedign with BM.  Lasted for nearly an hour.  No bleeding since.  He remains on xarelto.     AFVSS  AAOx3  Soft/nt/nd  Rectal:  External exam does demonstrate slight external hemorrhoidal tags particular in the right posterior and right anterior position.  Digital rectal exam with normal sphincter tone.  Anoscopy was performed demonstrating large internal hemorrhoids in all 3 columns.  Particular large in the right anterior and left lateral position.    A/P: Bleeding hemorrhoids    Discussion with patient.  I have recommended returning to the office at a time off of the Xarelto.  At that time would consider repeat banding.  Patient notes that he he would like to pursue this option.  He will call to schedule.

## 2022-04-13 ENCOUNTER — DOCUMENTATION ONLY (OUTPATIENT)
Dept: SURGERY | Facility: CLINIC | Age: 67
End: 2022-04-13
Payer: MEDICARE

## 2022-04-18 ENCOUNTER — DOCUMENTATION ONLY (OUTPATIENT)
Dept: CARDIOLOGY | Facility: HOSPITAL | Age: 67
End: 2022-04-18
Payer: MEDICARE

## 2022-04-18 DIAGNOSIS — Z95.818 STATUS POST PLACEMENT OF IMPLANTABLE LOOP RECORDER: Primary | ICD-10-CM

## 2022-04-18 DIAGNOSIS — I48.0 PAROXYSMAL ATRIAL FIBRILLATION: ICD-10-CM

## 2022-04-20 ENCOUNTER — TELEPHONE (OUTPATIENT)
Dept: SURGERY | Facility: CLINIC | Age: 67
End: 2022-04-20
Payer: MEDICARE

## 2022-04-20 NOTE — TELEPHONE ENCOUNTER
----- Message from Sivakumar Laguerre LPN sent at 4/8/2022  4:07 PM CDT -----  Regarding: FW: Off Xaerelto    ----- Message -----  From: Rodrigo Isabel MD  Sent: 4/8/2022   4:06 PM CDT  To: Sivakumar Laguerre LPN  Subject: RE: Off Xaerelto                                 Ok to hold eliquis, xarelto, pradaxa, coumadin for 2 days only (all that is needed, this is not anti-platelet medication) prior to procedure. Resume ASAP post procedure.    ----- Message -----  From: Sivakumar Laguerre LPN  Sent: 4/8/2022  10:37 AM CDT  To: Rodrigo Isabel MD  Subject: FW: Off Xaerelto                                   ----- Message -----  From: Naveen Clark LPN  Sent: 4/8/2022  10:32 AM CDT  To: Maame Martinez  Subject: Off Xaerelto                                     Seeking recommendation to hold Xarelto prior to office procedure on 5/5/22  Dr Mcgraw note:    A/P: Bleeding hemorrhoids     Discussion with patient.  I have recommended returning to the office at a time off of the Xarelto.  At that time would consider repeat banding.  Patient notes that he he would like to pursue this option.  He will call to schedule.

## 2022-04-22 ENCOUNTER — TELEPHONE (OUTPATIENT)
Dept: SURGERY | Facility: CLINIC | Age: 67
End: 2022-04-22
Payer: MEDICARE

## 2022-04-25 ENCOUNTER — CLINICAL SUPPORT (OUTPATIENT)
Dept: CARDIOLOGY | Facility: HOSPITAL | Age: 67
End: 2022-04-25
Attending: INTERNAL MEDICINE
Payer: MEDICARE

## 2022-04-25 DIAGNOSIS — Z95.818 STATUS POST PLACEMENT OF IMPLANTABLE LOOP RECORDER: ICD-10-CM

## 2022-04-25 DIAGNOSIS — I48.0 PAROXYSMAL ATRIAL FIBRILLATION: ICD-10-CM

## 2022-04-25 PROCEDURE — 93285 CARDIAC DEVICE CHECK - IN CLINIC & HOSPITAL: ICD-10-PCS | Mod: 26,,, | Performed by: INTERNAL MEDICINE

## 2022-04-25 PROCEDURE — 93285 PRGRMG DEV EVAL SCRMS IP: CPT | Mod: 26,,, | Performed by: INTERNAL MEDICINE

## 2022-04-29 ENCOUNTER — TELEPHONE (OUTPATIENT)
Dept: SURGERY | Facility: CLINIC | Age: 67
End: 2022-04-29
Payer: MEDICARE

## 2022-04-29 NOTE — TELEPHONE ENCOUNTER
Naveen Clark LPN    Licensed Nurse      Telephone Encounter   Signed   Encounter Date:  4/22/2022                       []Hide copied text    [][place] for details    Phone call to pt, will hold Xarelto on Tuesday 5/3 for banding on 5/5/22.

## 2022-04-29 NOTE — TELEPHONE ENCOUNTER
Patient was notified to stop his Xarelto on 5/3/22 for his banding on 5/5/22 by Naveen Clark LPN.  Jaciel

## 2022-05-04 ENCOUNTER — TELEPHONE (OUTPATIENT)
Dept: SURGERY | Facility: CLINIC | Age: 67
End: 2022-05-04
Payer: MEDICARE

## 2022-05-04 NOTE — TELEPHONE ENCOUNTER
----- Message from Viridiana Crews sent at 5/4/2022  2:32 PM CDT -----  Regarding: pt called  Name of Who is Calling: MINAL HENRY [[id number]]      What is the request in detail: pt has a procedure scheduled 05/05/2022 and would like to know if he needs a  with him . Please advise       Can the clinic reply by [facility]: No       What Number to Call Back if not in [facility]: [phone]

## 2022-05-05 ENCOUNTER — OFFICE VISIT (OUTPATIENT)
Dept: SURGERY | Facility: CLINIC | Age: 67
End: 2022-05-05
Payer: MEDICARE

## 2022-05-05 VITALS
TEMPERATURE: 99 F | WEIGHT: 187.81 LBS | DIASTOLIC BLOOD PRESSURE: 69 MMHG | BODY MASS INDEX: 30.18 KG/M2 | HEART RATE: 70 BPM | SYSTOLIC BLOOD PRESSURE: 133 MMHG | HEIGHT: 66 IN

## 2022-05-05 DIAGNOSIS — K64.9 BLEEDING HEMORRHOIDS: Primary | ICD-10-CM

## 2022-05-05 PROCEDURE — 99999 PR PBB SHADOW E&M-EST. PATIENT-LVL IV: CPT | Mod: PBBFAC,,, | Performed by: SURGERY

## 2022-05-05 PROCEDURE — 46221 PR HEMORRHOIDECTOMY INTERNAL RUBBER BAND LIGATIONS: ICD-10-PCS | Mod: S$PBB,,, | Performed by: SURGERY

## 2022-05-05 PROCEDURE — 99499 NO LOS: ICD-10-PCS | Mod: S$PBB,,, | Performed by: SURGERY

## 2022-05-05 PROCEDURE — 99999 PR PBB SHADOW E&M-EST. PATIENT-LVL IV: ICD-10-PCS | Mod: PBBFAC,,, | Performed by: SURGERY

## 2022-05-05 PROCEDURE — 99499 UNLISTED E&M SERVICE: CPT | Mod: S$PBB,,, | Performed by: SURGERY

## 2022-05-05 PROCEDURE — 46221 LIGATION OF HEMORRHOID(S): CPT | Mod: PBBFAC,PO | Performed by: SURGERY

## 2022-05-05 PROCEDURE — 99214 OFFICE O/P EST MOD 30 MIN: CPT | Mod: PBBFAC,PO | Performed by: SURGERY

## 2022-05-05 PROCEDURE — 46221 LIGATION OF HEMORRHOID(S): CPT | Mod: S$PBB,,, | Performed by: SURGERY

## 2022-05-05 RX ORDER — OXYCODONE AND ACETAMINOPHEN 5; 325 MG/1; MG/1
1 TABLET ORAL EVERY 4 HOURS PRN
Qty: 30 TABLET | Refills: 0 | Status: SHIPPED | OUTPATIENT
Start: 2022-05-05 | End: 2022-05-05 | Stop reason: SDUPTHER

## 2022-05-05 NOTE — TELEPHONE ENCOUNTER
Best Call Back Number [phone]  Additional Information: Patient states that his medication went to the wrong pharmacy:  oxyCODONE-acetaminophen (PERCOCET) 5-325 mg per tablet    It should have gone to [facility].    Please call to advise

## 2022-05-05 NOTE — PROGRESS NOTES
66-year-old gentleman returns to the office for scheduled banding.  Notes he continues to have bleeding and discomfort from the hemorrhoids.  Discussion with patient.  He desired to proceed with banding.  AFVSS  AAox3  Soft/nt/nd  Rectal exam:  External exam does demonstrate slight to moderate external hemorrhoids in the right anterior and right posterior position.  Digital rectal exam with normal sphincter tone.  Anoscopy was performed demonstrating large internal hemorrhoids in the right anterior, right posterior and left lateral regions.      A; Bleeding hemorrhoids        Discussion with patient he desired to proceed with banding.    Having received informed consent pt was placed in prone jackknife position. I then placed rubber bands on the L lateral column, R Ant and R post columns without event. Bleeding was minimal and pt tolerated the procedure well.      Pt will RTC in 6 weeks

## 2022-05-06 ENCOUNTER — TELEPHONE (OUTPATIENT)
Dept: SURGERY | Facility: CLINIC | Age: 67
End: 2022-05-06
Payer: MEDICARE

## 2022-05-06 RX ORDER — OXYCODONE AND ACETAMINOPHEN 5; 325 MG/1; MG/1
1 TABLET ORAL EVERY 4 HOURS PRN
Qty: 30 TABLET | Refills: 0 | Status: SHIPPED | OUTPATIENT
Start: 2022-05-06 | End: 2022-09-29

## 2022-05-26 ENCOUNTER — CLINICAL SUPPORT (OUTPATIENT)
Dept: CARDIOLOGY | Facility: HOSPITAL | Age: 67
End: 2022-05-26
Payer: MEDICARE

## 2022-05-26 DIAGNOSIS — Z95.818 PRESENCE OF OTHER CARDIAC IMPLANTS AND GRAFTS: ICD-10-CM

## 2022-05-26 PROCEDURE — 93298 REM INTERROG DEV EVAL SCRMS: CPT | Mod: ,,, | Performed by: INTERNAL MEDICINE

## 2022-05-26 PROCEDURE — 93298 CARDIAC DEVICE CHECK - REMOTE: ICD-10-PCS | Mod: ,,, | Performed by: INTERNAL MEDICINE

## 2022-06-22 ENCOUNTER — TELEPHONE (OUTPATIENT)
Dept: FAMILY MEDICINE | Facility: CLINIC | Age: 67
End: 2022-06-22
Payer: MEDICARE

## 2022-06-22 NOTE — TELEPHONE ENCOUNTER
----- Message from Kellie De La Paz sent at 6/22/2022 10:38 AM CDT -----  Contact: pt  Who Called: PT  Regarding: The pt have a persistent cough and is requesting a callback for same day scheduling.   Would the patient rather a call back or a response via [facility]? Call back  Best Call Back Number: [phone]  Additional Information:

## 2022-06-23 ENCOUNTER — TELEPHONE (OUTPATIENT)
Dept: SURGERY | Facility: CLINIC | Age: 67
End: 2022-06-23
Payer: MEDICARE

## 2022-06-23 NOTE — TELEPHONE ENCOUNTER
----- Message from Abby Chapin sent at 6/23/2022 11:17 AM CDT -----  Contact: [phone]  Type: Needs Medical Advice  Who Called: Pt    Best Call Back Number: [phone]    Additional Information: Pt calling because he missed his appt today at 10am. Pt asking for an earlier date for fu appt. Fist avail is 7/25. Pls call back and advise

## 2022-06-23 NOTE — TELEPHONE ENCOUNTER
I called patient to reschedule his appointment that he missed today due to he forgot that it was today.  I reschedule him on Wednesday, 7/13/22 @ 12:30pm in [place].  Jaciel

## 2022-06-25 ENCOUNTER — CLINICAL SUPPORT (OUTPATIENT)
Dept: CARDIOLOGY | Facility: HOSPITAL | Age: 67
End: 2022-06-25
Payer: MEDICARE

## 2022-06-25 DIAGNOSIS — Z95.818 PRESENCE OF OTHER CARDIAC IMPLANTS AND GRAFTS: ICD-10-CM

## 2022-06-27 ENCOUNTER — PATIENT MESSAGE (OUTPATIENT)
Dept: FAMILY MEDICINE | Facility: CLINIC | Age: 67
End: 2022-06-27
Payer: MEDICARE

## 2022-06-27 ENCOUNTER — LAB VISIT (OUTPATIENT)
Dept: LAB | Facility: HOSPITAL | Age: 67
End: 2022-06-27
Attending: FAMILY MEDICINE
Payer: MEDICARE

## 2022-06-27 DIAGNOSIS — E11.8 DIABETES MELLITUS TYPE 2 WITH COMPLICATIONS: ICD-10-CM

## 2022-06-27 DIAGNOSIS — E11.69 TYPE 2 DIABETES MELLITUS WITH OTHER SPECIFIED COMPLICATION, WITHOUT LONG-TERM CURRENT USE OF INSULIN: ICD-10-CM

## 2022-06-27 LAB
ESTIMATED AVG GLUCOSE: 114 MG/DL (ref 68–131)
HBA1C MFR BLD: 5.6 % (ref 4–5.6)

## 2022-06-27 PROCEDURE — 36415 COLL VENOUS BLD VENIPUNCTURE: CPT | Mod: PO | Performed by: FAMILY MEDICINE

## 2022-06-27 PROCEDURE — 83036 HEMOGLOBIN GLYCOSYLATED A1C: CPT | Performed by: FAMILY MEDICINE

## 2022-06-28 ENCOUNTER — OFFICE VISIT (OUTPATIENT)
Dept: FAMILY MEDICINE | Facility: CLINIC | Age: 67
End: 2022-06-28
Payer: MEDICARE

## 2022-06-28 VITALS
BODY MASS INDEX: 30.05 KG/M2 | OXYGEN SATURATION: 96 % | HEART RATE: 65 BPM | WEIGHT: 186.19 LBS | SYSTOLIC BLOOD PRESSURE: 110 MMHG | RESPIRATION RATE: 12 BRPM | DIASTOLIC BLOOD PRESSURE: 82 MMHG | TEMPERATURE: 98 F

## 2022-06-28 DIAGNOSIS — R05.9 COUGH: Primary | ICD-10-CM

## 2022-06-28 PROCEDURE — 99214 OFFICE O/P EST MOD 30 MIN: CPT | Mod: S$GLB,,, | Performed by: NURSE PRACTITIONER

## 2022-06-28 PROCEDURE — 99214 PR OFFICE/OUTPT VISIT, EST, LEVL IV, 30-39 MIN: ICD-10-PCS | Mod: S$GLB,,, | Performed by: NURSE PRACTITIONER

## 2022-06-28 RX ORDER — BENZONATATE 200 MG/1
200 CAPSULE ORAL 3 TIMES DAILY PRN
Qty: 30 CAPSULE | Refills: 1 | Status: SHIPPED | OUTPATIENT
Start: 2022-06-28 | End: 2022-07-08

## 2022-06-28 NOTE — TELEPHONE ENCOUNTER
Spoke with patient and new instructions were given.  Patient is currently taking metformin 500 in the am and 1000 in the pm?  Do you just want to reduce to 500 bid?  I told him to do that and we would let him know if you want anything different.

## 2022-06-28 NOTE — PATIENT INSTRUCTIONS
Your findings today are not concerning:  normal heart sounds, clear lung sounds.    Use the cough capsules 3x daily as needed for cough.    If you are not better in 7-10 days, if worse or you have concerns or questions, please do not hesitate to call Dr. Doe    Thank you for using the [facility]!    YOHANA Plummer, CNP, FNP-BC [facility]    To rate your experience with ROSALIO Plummer, click on the link below:      [email or web address]

## 2022-06-28 NOTE — PROGRESS NOTES
Trevor Johnston is a 66 y.o. male patient of Blanca Doe MD who presents to the clinic today for   Chief Complaint   Patient presents with    Cough   .    HPI    Pt, who is not known to me, reports a new problem to me: cough.   Denies fever, ST, productive cough.  Always has runny nose after shower or in the morning.  Coughing day and night, approx every half hour.      These symptoms began 2+ weeks  ago and status is initially going away then worsened again.  House-mate had the same cough and took AB.  .     Symptoms are + acute.    Pt denies the following symptoms:  Heart burn, sneezing, ST, earache, drip in the throat..    Aggravating factors include nothing .    Relieving factors include nothing .    OTC Medications tried are cough medicine OTC..    Prescription medications taken for symptoms are none.    Pertinent medical history:  No h/o cough, asthma or COPD.  Recalls that 3 years ago he had a similar cough in the past and got a gel cap that helped.     Expsoure:  grandson 8 mo old-had a cold.  3 yr old grandson not ill.  Both go to day care.House-mate.    He has also been digging a lot of holes in the yard to plant fruit trees and make anchors for his boat and shed with concrete.    Smoking status:  former    ROS    Constitutional:   No  fever, no fatigue, no chang3 in appetite.    Head:   No headache  Ears:   No pain.  Eyes:  No sxs  Nose:   No sinus pain, no congestion, no runny nose, no post nasal drip.  Throat:  No ST pain.    Heart:  No palpitations, chest pain.    Lungs:  no difficulty breathing, + coughing, no sputum production, no wheezing.              Symptoms are community acquired.    GI/:  No sxs.  Under treatment for hemorrhoids.  Had them banded and still has rectal bleeding.  Took himself off Xarelto for the procedure x 3 days.  Rescheduled for 6/23--missed appointment.  Went back on.    MS:  No new bone, joint or muscle problems.    Skin:  No rashes, itching.    Past Medical  History:   Diagnosis Date    Anticoagulant long-term use     ASD (atrial septal defect)     noted on 11/20 ECHO    Diabetes mellitus, type 2     Fatty liver disease, nonalcoholic     noted on CT    H/O cardiovascular stress test     normal 6/20    Hyperlipidemia     Hypertension     Hyperuricemia     Kidney stones     multiple episodes - on allopurinol    Macular degeneration, right eye     ROBERT (obstructive sleep apnea)     does not use CPAP    PAF (paroxysmal atrial fibrillation)     s/p cardioversion 11/20- pt denies    PFO with atrial septal aneurysm     RLS (restless legs syndrome)     Valvular heart disease     mod MR and mild TR on 6/20 echo       Current Outpatient Medications:     acetaminophen (TYLENOL) 325 MG tablet, Take 1 tablet (325 mg total) by mouth every 6 (six) hours as needed for Pain., Disp: , Rfl:     allopurinoL (ZYLOPRIM) 300 MG tablet, Take 1 tablet (300 mg total) by mouth once daily., Disp: 90 tablet, Rfl: 0    atorvastatin (LIPITOR) 40 MG tablet, TAKE 1 TABLET DAILY, Disp: 90 tablet, Rfl: 3    blood sugar diagnostic (FREESTYLE LITE STRIPS) Strp, USE TO CHECK BLOOD GLUCOSE ONCE A DAY, Disp: 100 each, Rfl: 3    blood sugar diagnostic Strp, , Disp: , Rfl:     blood-glucose meter kit, To check BG one times daily, to use with insurance preferred meter, Disp: 1 each, Rfl: 0    brimonidine 0.2% (ALPHAGAN) 0.2 % Drop, Place 1 drop into the right eye 2 (two) times a day. , Disp: , Rfl:     clotrimazole (LOTRIMIN) 1 % cream, Apply topically 2 (two) times daily., Disp: 113 g, Rfl: 1    dorzolamide-timolol 2-0.5% (COSOPT) 22.3-6.8 mg/mL ophthalmic solution, Place 1 drop into the right eye 2 (two) times daily. , Disp: , Rfl:     flecainide (TAMBOCOR) 100 MG Tab, TAKE 1 TABLET EVERY 12 HOURS, Disp: 180 tablet, Rfl: 3    gabapentin (NEURONTIN) 300 MG capsule, Take 1 capsule (300 mg total) by mouth every evening., Disp: 90 capsule, Rfl: 1    irbesartan (AVAPRO) 150 MG tablet, TAKE  1 TABLET DAILY, Disp: 90 tablet, Rfl: 1    lancets (FREESTYLE LANCETS) 28 gauge Misc, USE TO CHECK BLOOD GLUCOSE ONCE A DAY, Disp: 100 each, Rfl: 3    metFORMIN (GLUCOPHAGE-XR) 500 MG ER 24hr tablet, TAKE 1 TABLET TWICE A DAY WITH MEALS, Disp: 270 tablet, Rfl: 1    metoprolol succinate (TOPROL-XL) 25 MG 24 hr tablet, Take 1 tablet (25 mg total) by mouth 2 (two) times daily., Disp: 180 tablet, Rfl: 4    oxyCODONE-acetaminophen (PERCOCET) 5-325 mg per tablet, Take 1 tablet by mouth every 4 (four) hours as needed for Pain., Disp: 30 tablet, Rfl: 0    rOPINIRole (REQUIP) 0.5 MG tablet, TAKE 1 TABLET EVERY EVENING, Disp: 90 tablet, Rfl: 1    XARELTO 20 mg Tab, TAKE 1 TABLET EVERY EVENING, Disp: 90 tablet, Rfl: 4  No current facility-administered medications for this visit.    Facility-Administered Medications Ordered in Other Visits:     lactated ringers infusion, , Intravenous, Continuous, Tammy Marvin MD, Last Rate: 10 mL/hr at 08/12/20 1150, New Bag at 08/12/20 1249    lactated ringers infusion, , Intravenous, Continuous, Finesse De La Rosa MD, Stopped at 03/17/21 1035    lidocaine (PF) 10 mg/ml (1%) injection 10 mg, 1 mL, Intradermal, Once, Tammy Marvin MD    LIDOcaine (PF) 10 mg/ml (1%) injection 10 mg, 1 mL, Other, Once, Finesse De La Rosa MD      PHYSICAL EXAM    Alert, coop 60 y.o. female patient in no acute distress.    Vitals:    06/16/22 1740   BP: 120/69   Pulse: 90   Resp: 18   Temp: 98 °F (36.7 °C)   SpO2: 97%   Weight: 126.1 kg (277 lb 14.2 oz)     VS reviewed.  VS stable.  CC, nursing note, medications & PMH all reviewed today.    Head:  Normocephalic, atraumatic.    EENT:             ENT:  Ext ears normal in appearance.             Nose with no discharge.             Sinuses non-tender to palpation.   without submental, parotid, anterior cervical, posterior cervical and supraclavicular  nodes palp.    Resp:  Breathing unlabored.  Lungs CTA bilat.  Moves air to bases bilat.   Resp excursion symmetrical.      Heart:  Heart regular rate. and Regular rate and rhythm.  No MRG.       MS:  Ambulates 3. Normal: Walks 20', No Assistive device, no evidence for imbalance. Normal gait pattern.    NEURO:  Alert and oriented x 4.      Psych:  Responds appropriately throughout the visit.               pleasant and appropriate , well-groomed, appropriate .    Skin:  Warm, dry, color good.    Cough  -     benzonatate (TESSALON) 200 MG capsule; Take 1 capsule (200 mg total) by mouth 3 (three) times daily as needed for Cough.  Dispense: 30 capsule; Refill: 1          Pt today presents with   Chief Complaint   Patient presents with    Cough   > 2 weeks.  No other resp symptoms, no fever.    Additionally, he had a cough like this in the past--one that lingered--and some gel caps that were light yellow (Perles) helped.  He has also been working in the dirt and with concrete.      This is a new problem to me.  No work up is planned.     Pt advised to perform comfort measures recommended on patient instruction sheet, which were reviewed at the time of the visit..    If not better in 7 days, the patient is advised to call here, PCP office or go for an in-person/follow up evaluation.  If worse or concerns, the patient is advised to call for advise to this office or the PCP office or call [facility] or go to the nearest URGENT CARE or ER.  Explained exam findings, diagnosis and treatment plan to patient.  Questions answered and patient states understanding.

## 2022-06-29 ENCOUNTER — PATIENT MESSAGE (OUTPATIENT)
Dept: FAMILY MEDICINE | Facility: CLINIC | Age: 67
End: 2022-06-29
Payer: MEDICARE

## 2022-07-01 DIAGNOSIS — N20.0 KIDNEY STONES: ICD-10-CM

## 2022-07-01 RX ORDER — ALLOPURINOL 300 MG/1
TABLET ORAL
Qty: 90 TABLET | Refills: 2 | Status: SHIPPED | OUTPATIENT
Start: 2022-07-01 | End: 2023-03-28

## 2022-07-01 NOTE — TELEPHONE ENCOUNTER
No new care gaps identified.  [facility] Embedded Care Gaps. Reference number: [id number]. 7/01/2022   2:47:30 AM CDT

## 2022-07-01 NOTE — TELEPHONE ENCOUNTER
Refill Decision Note   Trevor Vickie  is requesting a refill authorization.  Brief Assessment and Rationale for Refill:  Approve     Medication Therapy Plan:       Medication Reconciliation Completed: Yes   Comments:     No Care Gaps recommended.     Note composed:4:50 PM 07/01/2022

## 2022-07-11 DIAGNOSIS — M25.562 LEFT KNEE PAIN, UNSPECIFIED CHRONICITY: Primary | ICD-10-CM

## 2022-07-12 ENCOUNTER — HOSPITAL ENCOUNTER (OUTPATIENT)
Dept: RADIOLOGY | Facility: HOSPITAL | Age: 67
Discharge: HOME | End: 2022-07-12
Attending: NURSE PRACTITIONER
Payer: MEDICARE

## 2022-07-12 ENCOUNTER — OFFICE VISIT (OUTPATIENT)
Dept: ORTHOPEDICS | Facility: CLINIC | Age: 67
End: 2022-07-12
Payer: MEDICARE

## 2022-07-12 VITALS — HEIGHT: 66 IN | BODY MASS INDEX: 29.94 KG/M2 | WEIGHT: 186.31 LBS

## 2022-07-12 DIAGNOSIS — M25.562 LEFT KNEE PAIN, UNSPECIFIED CHRONICITY: ICD-10-CM

## 2022-07-12 DIAGNOSIS — M17.12 PRIMARY OSTEOARTHRITIS OF LEFT KNEE: Primary | ICD-10-CM

## 2022-07-12 PROCEDURE — 20610 DRAIN/INJ JOINT/BURSA W/O US: CPT | Mod: S$PBB,LT,, | Performed by: NURSE PRACTITIONER

## 2022-07-12 PROCEDURE — 99213 PR OFFICE/OUTPT VISIT, EST, LEVL III, 20-29 MIN: ICD-10-PCS | Mod: S$PBB,25,, | Performed by: NURSE PRACTITIONER

## 2022-07-12 PROCEDURE — 73562 X-RAY EXAM OF KNEE 3: CPT | Mod: 26,LT,, | Performed by: RADIOLOGY

## 2022-07-12 PROCEDURE — 73562 X-RAY EXAM OF KNEE 3: CPT | Mod: TC,PO,LT

## 2022-07-12 PROCEDURE — 20610 LARGE JOINT ASPIRATION/INJECTION: L KNEE: ICD-10-PCS | Mod: S$PBB,LT,, | Performed by: NURSE PRACTITIONER

## 2022-07-12 PROCEDURE — 99999 PR PBB SHADOW E&M-EST. PATIENT-LVL IV: ICD-10-PCS | Mod: PBBFAC,,, | Performed by: NURSE PRACTITIONER

## 2022-07-12 PROCEDURE — 20610 DRAIN/INJ JOINT/BURSA W/O US: CPT | Mod: PBBFAC,PN,LT | Performed by: NURSE PRACTITIONER

## 2022-07-12 PROCEDURE — 73562 XR KNEE 3 VIEW LEFT: ICD-10-PCS | Mod: 26,LT,, | Performed by: RADIOLOGY

## 2022-07-12 PROCEDURE — 99213 OFFICE O/P EST LOW 20 MIN: CPT | Mod: S$PBB,25,, | Performed by: NURSE PRACTITIONER

## 2022-07-12 PROCEDURE — 99214 OFFICE O/P EST MOD 30 MIN: CPT | Mod: PBBFAC,PN,25 | Performed by: NURSE PRACTITIONER

## 2022-07-12 PROCEDURE — 99999 PR PBB SHADOW E&M-EST. PATIENT-LVL IV: CPT | Mod: PBBFAC,,, | Performed by: NURSE PRACTITIONER

## 2022-07-12 RX ORDER — TRIAMCINOLONE ACETONIDE 40 MG/ML
40 INJECTION, SUSPENSION INTRA-ARTICULAR; INTRAMUSCULAR
Status: DISCONTINUED | OUTPATIENT
Start: 2022-07-12 | End: 2022-07-12 | Stop reason: HOSPADM

## 2022-07-12 RX ADMIN — TRIAMCINOLONE ACETONIDE 40 MG: 40 INJECTION, SUSPENSION INTRA-ARTICULAR; INTRAMUSCULAR at 11:07

## 2022-07-12 NOTE — PROCEDURES
Large Joint Aspiration/Injection: L knee    Date/Time: 7/12/2022 11:40 AM  Performed by: ROSALIO Sanchez  Authorized by: ROSALIO Sanchez     Consent Done?:  Yes (Verbal)  Indications:  Pain  Timeout: prior to procedure the correct patient, procedure, and site was verified    Prep: patient was prepped and draped in usual sterile fashion    Local anesthetic:  Lidocaine 1% without epinephrine  Anesthetic total (ml):  5      Details:  Needle Size:  21 G  Approach:  Anterolateral  Location:  Knee  Site:  L knee  Medications:  40 mg triamcinolone acetonide 40 mg/mL  Patient tolerance:  Patient tolerated the procedure well with no immediate complications

## 2022-07-12 NOTE — PROGRESS NOTES
Chief Complaint   Patient presents with    Left Knee - Pain         HPI:   This is a 66 y.o. who presents to clinic today complaining of left knee pain for 1 weeks after no known trauma. He states he doesn't think he did anything to his knee to injure it, but he did have to dig in his mother's yard recently; was trying to prevent water from just sitting in her yard every time it rains. Pain is progressively worsening. No numbness or tingling. No associated signs or symptoms.    Past Medical History:   Diagnosis Date    Anticoagulant long-term use     ASD (atrial septal defect)     noted on 11/20 ECHO    Diabetes mellitus, type 2     Fatty liver disease, nonalcoholic     noted on CT    H/O cardiovascular stress test     normal 6/20    Hyperlipidemia     Hypertension     Hyperuricemia     Kidney stones     multiple episodes - on allopurinol    Macular degeneration, right eye     ROBERT (obstructive sleep apnea)     does not use CPAP    PAF (paroxysmal atrial fibrillation)     s/p cardioversion 11/20- pt denies    PFO with atrial septal aneurysm     RLS (restless legs syndrome)     Valvular heart disease     mod MR and mild TR on 6/20 echo     Past Surgical History:   Procedure Laterality Date    CATARACT EXTRACTION W/  INTRAOCULAR LENS IMPLANT Right 02/2017    COLONOSCOPY      COLONOSCOPY N/A 9/30/2021    Procedure: COLONOSCOPY;  Surgeon: bS Mcgraw MD;  Location: [facility] ENDO;  Service: Endoscopy;  Laterality: N/A;    CYSTOURETEROSCOPY WITH RETROGRADE PYELOGRAPHY AND INSERTION OF STENT INTO URETER Left 3/5/2020    Procedure: CYSTOURETEROSCOPY, WITH RETROGRADE PYELOGRAM AND URETERAL STENT EXCHANGE, flexible ureterscope;  Surgeon: Sb Connelly MD;  Location: [facility] OR;  Service: Urology;  Laterality: Left;    EXTRACORPOREAL SHOCK WAVE LITHOTRIPSY Left 7/15/2020    Procedure: LITHOTRIPSY, ESWL;  Surgeon: Sb Connelly MD;  Location[facility];  Service: Urology;  Laterality: Left;     EXTRACORPOREAL SHOCK WAVE LITHOTRIPSY N/A 8/12/2020    Procedure: LITHOTRIPSY, ESWL;  Surgeon: Sb Connelly MD;  Location: [facility] OR;  Service: Urology;  Laterality: N/A;    INSERTION OF IMPLANTABLE LOOP RECORDER N/A 4/15/2022    Procedure: Insertion, Implantable Loop Recorder;  Surgeon: Rodrigo Isabel MD;  Location: [facility] CATH;  Service: Cardiology;  Laterality: N/A;    LASER LITHOTRIPSY Left 3/5/2020    Procedure: LITHOTRIPSY, USING LASER;  Surgeon: Sb Connelly MD;  Location: [facility] OR;  Service: Urology;  Laterality: Left;    ROTATOR CUFF REPAIR Right     TRIGGER FINGER RELEASE Bilateral     VASECTOMY       Current Outpatient Medications on File Prior to Visit   Medication Sig Dispense Refill    acetaminophen (TYLENOL) 325 MG tablet Take 1 tablet (325 mg total) by mouth every 6 (six) hours as needed for Pain.      allopurinoL (ZYLOPRIM) 300 MG tablet TAKE 1 TABLET DAILY 90 tablet 2    atorvastatin (LIPITOR) 40 MG tablet TAKE 1 TABLET DAILY 90 tablet 3    blood sugar diagnostic (FREESTYLE LITE STRIPS) Strp USE TO CHECK BLOOD GLUCOSE ONCE A  each 3    blood sugar diagnostic Strp       blood-glucose meter kit To check BG one times daily, to use with insurance preferred meter 1 each 0    brimonidine 0.2% (ALPHAGAN) 0.2 % Drop Place 1 drop into the right eye 2 (two) times a day.       clotrimazole (LOTRIMIN) 1 % cream Apply topically 2 (two) times daily. 113 g 1    dorzolamide-timolol 2-0.5% (COSOPT) 22.3-6.8 mg/mL ophthalmic solution Place 1 drop into the right eye 2 (two) times daily.       flecainide (TAMBOCOR) 100 MG Tab TAKE 1 TABLET EVERY 12 HOURS 180 tablet 3    gabapentin (NEURONTIN) 300 MG capsule Take 1 capsule (300 mg total) by mouth every evening. 90 capsule 1    irbesartan (AVAPRO) 150 MG tablet TAKE 1 TABLET DAILY 90 tablet 1    lancets (FREESTYLE LANCETS) 28 gauge Misc USE TO CHECK BLOOD GLUCOSE ONCE A  each 3    metFORMIN (GLUCOPHAGE-XR) 500 MG ER 24hr tablet  TAKE 1 TABLET TWICE A DAY WITH MEALS 270 tablet 1    metoprolol succinate (TOPROL-XL) 25 MG 24 hr tablet Take 1 tablet (25 mg total) by mouth 2 (two) times daily. 180 tablet 4    oxyCODONE-acetaminophen (PERCOCET) 5-325 mg per tablet Take 1 tablet by mouth every 4 (four) hours as needed for Pain. 30 tablet 0    rOPINIRole (REQUIP) 0.5 MG tablet TAKE 1 TABLET EVERY EVENING 90 tablet 1    XARELTO 20 mg Tab TAKE 1 TABLET EVERY EVENING 90 tablet 4     Current Facility-Administered Medications on File Prior to Visit   Medication Dose Route Frequency Provider Last Rate Last Admin    lactated ringers infusion   Intravenous Continuous Tammy Marvin MD 10 mL/hr at 20 1150 New Bag at 20 1249    lactated ringers infusion   Intravenous Continuous Finesse De La Rosa MD   Stopped at 21 1035    lidocaine (PF) 10 mg/ml (1%) injection 10 mg  1 mL Intradermal Once Tammy Marvin MD        LIDOcaine (PF) 10 mg/ml (1%) injection 10 mg  1 mL Other Once Finesse De La Rosa MD         Review of patient's allergies indicates:  No Known Allergies  Family History   Problem Relation Age of Onset    Diabetes Mother     Diabetes Father     Cancer Maternal Grandfather     Throat cancer Maternal Grandfather      Social History     Socioeconomic History    Marital status: Single   Tobacco Use    Smoking status: Former Smoker     Years: 45.00     Quit date: 2017     Years since quittin.5    Smokeless tobacco: Never Used    Tobacco comment: 45 year, about 1PPW, quit 2017   Substance and Sexual Activity    Alcohol use: Not Currently    Drug use: No    Sexual activity: Yes     Partners: Female     Birth control/protection: See Surgical Hx   Social History Narrative    18: he is retired. He used to work as a  for 10 years and also an . The last 10 years he was an  for [employer]. He lives with his wife.He has two kids; they live nearby.      Social Determinants of  Health     Financial Resource Strain: Low Risk     Difficulty of Paying Living Expenses: Not very hard   Food Insecurity: No Food Insecurity    Worried About Running Out of Food in the Last Year: Never true    Ran Out of Food in the Last Year: Never true   Transportation Needs: No Transportation Needs    Lack of Transportation (Medical): No    Lack of Transportation (Non-Medical): No   Physical Activity: Inactive    Days of Exercise per Week: 0 days    Minutes of Exercise per Session: 0 min   Stress: No Stress Concern Present    Feeling of Stress : Not at all   Social Connections: Unknown    Frequency of Communication with Friends and Family: More than three times a week    Frequency of Social Gatherings with Friends and Family: More than three times a week    Active Member of Clubs or Organizations: No    Attends Club or Organization Meetings: Never    Marital Status:    Housing Stability: Low Risk     Unable to Pay for Housing in the Last Year: No    Number of Places Lived in the Last Year: 1    Unstable Housing in the Last Year: No       Review of Systems:  Constitutional:  Denies fever or chills   Eyes:  Denies change in visual acuity   HENT:  Denies nasal congestion or sore throat   Respiratory:  Denies cough or shortness of breath   Cardiovascular:  Denies chest pain or edema   GI:  Denies abdominal pain, nausea, vomiting, bloody stools or diarrhea   :  Denies dysuria   Integument:  Denies rash   Neurologic:  Denies headache, focal weakness or sensory changes   Endocrine:  Denies polyuria or polydipsia   Lymphatic:  Denies swollen glands   Psychiatric:  Denies depression or anxiety     Physical Exam:   Constitutional:  Well developed, well nourished, no acute distress, non-toxic appearance   Integument:  Well hydrated, warm and dry.   Neurologic:  Alert & oriented x 3  Psychiatric:  Speech and behavior appropriate     Bilateral Knee Exam    Left Knee Exam     Tenderness   The patient  is experiencing tenderness in the medial joint line.    Range of Motion   Extension: abnormal   Flexion: abnormal     Muscle Strength     The patient has normal knee strength.    Tests   Rosa M:  Medial - positive   Lachman:  Anterior - negative      Varus: negative  Valgus: negative  Patellar Apprehension: negative    Other   Erythema: absent  Sensation: normal  Pulse: present  Swelling: mild      Right Knee Exam   Right knee exam performed same as contralateral side and is normal.        X-rays were performed, personally reviewed by me and findings discussed with the patient.  3 views of the left knee show tricompartmental degenerative change most pronounced in the medial compartment with Kellgren 3 changes          Primary osteoarthritis of left knee  -     Large Joint Aspiration/Injection: L knee    Using an aseptic technique, I injected 5 cc of lidocaine 1% without and 1 cc of kenalog 40mg into the left Knee. The patient tolerated this well.     RTC as needed.

## 2022-07-13 ENCOUNTER — IMMUNIZATION (OUTPATIENT)
Dept: FAMILY MEDICINE | Facility: CLINIC | Age: 67
End: 2022-07-13
Payer: MEDICARE

## 2022-07-13 ENCOUNTER — OFFICE VISIT (OUTPATIENT)
Dept: SURGERY | Facility: CLINIC | Age: 67
End: 2022-07-13
Payer: MEDICARE

## 2022-07-13 VITALS
HEIGHT: 66 IN | DIASTOLIC BLOOD PRESSURE: 83 MMHG | HEART RATE: 61 BPM | BODY MASS INDEX: 29.9 KG/M2 | TEMPERATURE: 98 F | SYSTOLIC BLOOD PRESSURE: 158 MMHG | WEIGHT: 186.06 LBS

## 2022-07-13 DIAGNOSIS — Z23 NEED FOR VACCINATION: Primary | ICD-10-CM

## 2022-07-13 DIAGNOSIS — K64.1 GRADE II HEMORRHOIDS: Primary | ICD-10-CM

## 2022-07-13 PROCEDURE — 99999 PR PBB SHADOW E&M-EST. PATIENT-LVL IV: CPT | Mod: PBBFAC,,, | Performed by: SURGERY

## 2022-07-13 PROCEDURE — 0054A COVID-19, MRNA, LNP-S, PF, 30 MCG/0.3 ML DOSE VACCINE (PFIZER): CPT | Mod: PBBFAC,PO

## 2022-07-13 PROCEDURE — 99999 PR PBB SHADOW E&M-EST. PATIENT-LVL IV: ICD-10-PCS | Mod: PBBFAC,,, | Performed by: SURGERY

## 2022-07-13 PROCEDURE — 99213 OFFICE O/P EST LOW 20 MIN: CPT | Mod: S$PBB,,, | Performed by: SURGERY

## 2022-07-13 PROCEDURE — 99213 PR OFFICE/OUTPT VISIT, EST, LEVL III, 20-29 MIN: ICD-10-PCS | Mod: S$PBB,,, | Performed by: SURGERY

## 2022-07-13 PROCEDURE — 99214 OFFICE O/P EST MOD 30 MIN: CPT | Mod: PBBFAC,PO | Performed by: SURGERY

## 2022-07-13 NOTE — PROGRESS NOTES
Pt returns after banding in May.  NOtes that he feels much better.  Notes his bleeding has improved.  Notes occasionally having a drop of blood with wiping.  Not  Taking fiber.        AFVSS  AAox3  Soft/nd/nt  Rectal: deferred per pt as no longer having symptoms      A/P: Hemorrhoids    Doing well.  Symptoms have improved tremendously.  Recommend daily fiber.  F/u with me prn

## 2022-07-18 ENCOUNTER — TELEPHONE (OUTPATIENT)
Dept: ORTHOPEDICS | Facility: CLINIC | Age: 67
End: 2022-07-18
Payer: MEDICARE

## 2022-07-18 DIAGNOSIS — I48.0 PAF (PAROXYSMAL ATRIAL FIBRILLATION): ICD-10-CM

## 2022-07-18 RX ORDER — RIVAROXABAN 20 MG/1
TABLET, FILM COATED ORAL
Qty: 90 TABLET | Refills: 4 | Status: SHIPPED | OUTPATIENT
Start: 2022-07-18 | End: 2023-10-09

## 2022-07-18 NOTE — TELEPHONE ENCOUNTER
Spoke with pt and advise that I can schedule him to come in to see Daya to discuss further treatment options since its no relief from last inj.  Pt agreed and appointment was made for 7/21/22 @ 9:20

## 2022-07-18 NOTE — TELEPHONE ENCOUNTER
----- Message from Abby Chapin sent at 7/18/2022 10:35 AM CDT -----  Contact: [phone]  Type: Needs Medical Advice  Who Called: Pt    Best Call Back Number: [phone]    Additional Information: Pt had injections in knee last week and is still in a lot of pain.Pt thinks pain is getting worse. Pls call back advise

## 2022-07-18 NOTE — TELEPHONE ENCOUNTER
----- Message from Josh Seaman MA sent at 7/18/2022  1:07 PM CDT -----  Contact: patient  Patient called in and stated he got  an injection in his left knee on 7/12/22 but it does not appear to be helping.  Patient would like a call back to discuss what he should do.      Call back number is [phone]

## 2022-07-21 ENCOUNTER — OFFICE VISIT (OUTPATIENT)
Dept: ORTHOPEDICS | Facility: CLINIC | Age: 67
End: 2022-07-21
Payer: MEDICARE

## 2022-07-21 VITALS — WEIGHT: 186 LBS | HEIGHT: 69 IN | BODY MASS INDEX: 27.55 KG/M2

## 2022-07-21 DIAGNOSIS — S89.92XD LEFT KNEE INJURY, SUBSEQUENT ENCOUNTER: ICD-10-CM

## 2022-07-21 DIAGNOSIS — M17.12 PRIMARY OSTEOARTHRITIS OF LEFT KNEE: Primary | ICD-10-CM

## 2022-07-21 PROCEDURE — 99999 PR PBB SHADOW E&M-EST. PATIENT-LVL IV: ICD-10-PCS | Mod: PBBFAC,,, | Performed by: NURSE PRACTITIONER

## 2022-07-21 PROCEDURE — 99213 OFFICE O/P EST LOW 20 MIN: CPT | Mod: S$PBB,,, | Performed by: NURSE PRACTITIONER

## 2022-07-21 PROCEDURE — 99214 OFFICE O/P EST MOD 30 MIN: CPT | Mod: PBBFAC,PN | Performed by: NURSE PRACTITIONER

## 2022-07-21 PROCEDURE — 99999 PR PBB SHADOW E&M-EST. PATIENT-LVL IV: CPT | Mod: PBBFAC,,, | Performed by: NURSE PRACTITIONER

## 2022-07-21 PROCEDURE — 99213 PR OFFICE/OUTPT VISIT, EST, LEVL III, 20-29 MIN: ICD-10-PCS | Mod: S$PBB,,, | Performed by: NURSE PRACTITIONER

## 2022-07-21 NOTE — PROGRESS NOTES
Chief Complaint   Patient presents with    Left Knee - Pain       HPI:   This is a 66 y.o. who returns to clinic today in follow-up for left knee for the past 3 weeks after twisting injury possibly when digging with shovel in his mother's back yard. Pain is sharp. No numbness or tingling. No associated signs or symptoms. At previous appointment, we injected his left knee, but only had a few days of relief. Takes a blood thinner, so cannot take a lot of NSAIDs. He does have a loop recorder and his cardiologist is Dr. Isabel. He is unsure of the . Denies any buckling, locking or popping.     Past Medical History:   Diagnosis Date    Anticoagulant long-term use     ASD (atrial septal defect)     noted on 11/20 ECHO    Diabetes mellitus, type 2     Fatty liver disease, nonalcoholic     noted on CT    H/O cardiovascular stress test     normal 6/20    Hyperlipidemia     Hypertension     Hyperuricemia     Kidney stones     multiple episodes - on allopurinol    Macular degeneration, right eye     ROBERT (obstructive sleep apnea)     does not use CPAP    PAF (paroxysmal atrial fibrillation)     s/p cardioversion 11/20- pt denies    PFO with atrial septal aneurysm     RLS (restless legs syndrome)     Valvular heart disease     mod MR and mild TR on 6/20 echo     Past Surgical History:   Procedure Laterality Date    CATARACT EXTRACTION W/  INTRAOCULAR LENS IMPLANT Right 02/2017    COLONOSCOPY      COLONOSCOPY N/A 9/30/2021    Procedure: COLONOSCOPY;  Surgeon: Sb Mcgraw MD;  Location[facility];  Service: Endoscopy;  Laterality: N/A;    CYSTOURETEROSCOPY WITH RETROGRADE PYELOGRAPHY AND INSERTION OF STENT INTO URETER Left 3/5/2020    Procedure: CYSTOURETEROSCOPY, WITH RETROGRADE PYELOGRAM AND URETERAL STENT EXCHANGE, flexible ureterscope;  Surgeon: Sb Connelly MD;  Location: [facility];  Service: Urology;  Laterality: Left;    EXTRACORPOREAL SHOCK WAVE LITHOTRIPSY Left 7/15/2020     Procedure: LITHOTRIPSY, ESWL;  Surgeon: Sb Connelly MD;  Location: [facility];  Service: Urology;  Laterality: Left;    EXTRACORPOREAL SHOCK WAVE LITHOTRIPSY N/A 8/12/2020    Procedure: LITHOTRIPSY, ESWL;  Surgeon: Sb Connelly MD;  Location: [facility];  Service: Urology;  Laterality: N/A;    INSERTION OF IMPLANTABLE LOOP RECORDER N/A 4/15/2022    Procedure: Insertion, Implantable Loop Recorder;  Surgeon: Rodrigo Isabel MD;  Location: [facility];  Service: Cardiology;  Laterality: N/A;    LASER LITHOTRIPSY Left 3/5/2020    Procedure: LITHOTRIPSY, USING LASER;  Surgeon: Sb Connelly MD;  Location: [facility] OR;  Service: Urology;  Laterality: Left;    ROTATOR CUFF REPAIR Right     TRIGGER FINGER RELEASE Bilateral     VASECTOMY       Current Outpatient Medications on File Prior to Visit   Medication Sig Dispense Refill    allopurinoL (ZYLOPRIM) 300 MG tablet TAKE 1 TABLET DAILY 90 tablet 2    atorvastatin (LIPITOR) 40 MG tablet TAKE 1 TABLET DAILY 90 tablet 3    blood sugar diagnostic (FREESTYLE LITE STRIPS) Strp USE TO CHECK BLOOD GLUCOSE ONCE A  each 3    blood sugar diagnostic Strp       brimonidine 0.2% (ALPHAGAN) 0.2 % Drop Place 1 drop into the right eye 2 (two) times a day.       clotrimazole (LOTRIMIN) 1 % cream Apply topically 2 (two) times daily. 113 g 1    dorzolamide-timolol 2-0.5% (COSOPT) 22.3-6.8 mg/mL ophthalmic solution Place 1 drop into the right eye 2 (two) times daily.       flecainide (TAMBOCOR) 100 MG Tab TAKE 1 TABLET EVERY 12 HOURS 180 tablet 3    gabapentin (NEURONTIN) 300 MG capsule Take 1 capsule (300 mg total) by mouth every evening. 90 capsule 1    irbesartan (AVAPRO) 150 MG tablet TAKE 1 TABLET DAILY 90 tablet 1    lancets (FREESTYLE LANCETS) 28 gauge Misc USE TO CHECK BLOOD GLUCOSE ONCE A  each 3    metFORMIN (GLUCOPHAGE-XR) 500 MG ER 24hr tablet TAKE 1 TABLET TWICE A DAY WITH MEALS 270 tablet 1    metoprolol succinate (TOPROL-XL) 25 MG 24 hr  tablet Take 1 tablet (25 mg total) by mouth 2 (two) times daily. 180 tablet 4    oxyCODONE-acetaminophen (PERCOCET) 5-325 mg per tablet Take 1 tablet by mouth every 4 (four) hours as needed for Pain. 30 tablet 0    rOPINIRole (REQUIP) 0.5 MG tablet TAKE 1 TABLET EVERY EVENING 90 tablet 1    XARELTO 20 mg Tab TAKE 1 TABLET EVERY EVENING 90 tablet 4    acetaminophen (TYLENOL) 325 MG tablet Take 1 tablet (325 mg total) by mouth every 6 (six) hours as needed for Pain. (Patient not taking: Reported on 2022)      blood-glucose meter kit To check BG one times daily, to use with insurance preferred meter 1 each 0     Current Facility-Administered Medications on File Prior to Visit   Medication Dose Route Frequency Provider Last Rate Last Admin    lactated ringers infusion   Intravenous Continuous Tammy Marvin MD 10 mL/hr at 20 1150 New Bag at 20 1249    lactated ringers infusion   Intravenous Continuous Finesse De La Rosa MD   Stopped at 21 1035    lidocaine (PF) 10 mg/ml (1%) injection 10 mg  1 mL Intradermal Once Tammy Marvin MD        LIDOcaine (PF) 10 mg/ml (1%) injection 10 mg  1 mL Other Once Finesse De La Rosa MD         Review of patient's allergies indicates:  No Known Allergies  Family History   Problem Relation Age of Onset    Diabetes Mother     Diabetes Father     Cancer Maternal Grandfather     Throat cancer Maternal Grandfather      Social History     Socioeconomic History    Marital status: Single   Tobacco Use    Smoking status: Former Smoker     Years: 45.00     Quit date: 2017     Years since quittin.5    Smokeless tobacco: Never Used    Tobacco comment: 45 year, about 1PPW, quit 2017   Substance and Sexual Activity    Alcohol use: Not Currently    Drug use: No    Sexual activity: Yes     Partners: Female     Birth control/protection: See Surgical Hx   Social History Narrative    18: he is retired. He used to work as a  for  10 years and also an . The last 10 years he was an  for [email or web address]. He lives with his wife.He has two kids; they live nearby.      Social Determinants of Health     Financial Resource Strain: Low Risk     Difficulty of Paying Living Expenses: Not very hard   Food Insecurity: No Food Insecurity    Worried About Running Out of Food in the Last Year: Never true    Ran Out of Food in the Last Year: Never true   Transportation Needs: No Transportation Needs    Lack of Transportation (Medical): No    Lack of Transportation (Non-Medical): No   Physical Activity: Inactive    Days of Exercise per Week: 0 days    Minutes of Exercise per Session: 0 min   Stress: No Stress Concern Present    Feeling of Stress : Not at all   Social Connections: Unknown    Frequency of Communication with Friends and Family: More than three times a week    Frequency of Social Gatherings with Friends and Family: More than three times a week    Active Member of Clubs or Organizations: No    Attends Club or Organization Meetings: Never    Marital Status:    Housing Stability: Low Risk     Unable to Pay for Housing in the Last Year: No    Number of Places Lived in the Last Year: 1    Unstable Housing in the Last Year: No       Review of Systems:  Constitutional:  Denies fever or chills   Eyes:  Denies change in visual acuity   HENT:  Denies nasal congestion or sore throat   Respiratory:  Denies cough or shortness of breath   Cardiovascular:  Denies chest pain or edema   GI:  Denies abdominal pain, nausea, vomiting, bloody stools or diarrhea   :  Denies dysuria   Integument:  Denies rash   Neurologic:  Denies headache, focal weakness or sensory changes   Endocrine:  Denies polyuria or polydipsia   Lymphatic:  Denies swollen glands   Psychiatric:  Denies depression or anxiety     Physical Exam:   Constitutional:  Well developed, well nourished, no acute distress, non-toxic appearance   Integument:  Well hydrated,  warm and dry.   Neurologic:  Alert & oriented x 3  Psychiatric:  Speech and behavior appropriate        Bilateral Knee Exam    Left Knee Exam   Tenderness   The patient is experiencing tenderness in the medial joint line.    Range of Motion   Flexion: abnormal     Muscle Strength   The patient has normal bilateral knee strength.    Tests   Rosa M:  Medial - positive   Lachman:  Anterior - negative      Varus: negative  Valgus: negative  Patellar Apprehension: negative      Other   Erythema: absent  Sensation: normal  Pulse: present  Swelling: mild    Right Knee exam   Knee exam performed same as contralateral side and is normal        Primary osteoarthritis of left knee  -     MRI Knee Without Contrast Left; Future; Expected date: 07/21/2022    Left knee injury, subsequent encounter  -     MRI Knee Without Contrast Left; Future; Expected date: 07/21/2022    We injected his left knee last visit, but didn't get much relief.    He states bending his knee actually makes his knee feel better.     Apply a compressive ACE bandage. Rest and elevate the affected painful area.  Apply cold compresses intermittently as needed.  As pain recedes, begin normal activities slowly as tolerated.  Call if symptoms persist.    Will obtain MRI of left knee which depending on results will further dictate his plan of care. Will call him with results. We will consider PT once we have MRI results.

## 2022-07-25 ENCOUNTER — CLINICAL SUPPORT (OUTPATIENT)
Dept: CARDIOLOGY | Facility: HOSPITAL | Age: 67
End: 2022-07-25
Payer: MEDICARE

## 2022-07-25 DIAGNOSIS — Z95.818 PRESENCE OF OTHER CARDIAC IMPLANTS AND GRAFTS: ICD-10-CM

## 2022-07-25 PROCEDURE — 93298 REM INTERROG DEV EVAL SCRMS: CPT | Mod: ,,, | Performed by: INTERNAL MEDICINE

## 2022-07-25 PROCEDURE — 93298 CARDIAC DEVICE CHECK - REMOTE: ICD-10-PCS | Mod: ,,, | Performed by: INTERNAL MEDICINE

## 2022-08-02 ENCOUNTER — PATIENT MESSAGE (OUTPATIENT)
Dept: ORTHOPEDICS | Facility: CLINIC | Age: 67
End: 2022-08-02
Payer: MEDICARE

## 2022-08-04 DIAGNOSIS — M17.12 PRIMARY OSTEOARTHRITIS OF LEFT KNEE: Primary | ICD-10-CM

## 2022-08-22 ENCOUNTER — CLINICAL SUPPORT (OUTPATIENT)
Dept: REHABILITATION | Facility: HOSPITAL | Age: 67
End: 2022-08-22
Payer: MEDICARE

## 2022-08-22 DIAGNOSIS — R68.89 DECREASED ACTIVITY TOLERANCE: ICD-10-CM

## 2022-08-22 DIAGNOSIS — M17.12 PRIMARY OSTEOARTHRITIS OF LEFT KNEE: ICD-10-CM

## 2022-08-22 DIAGNOSIS — R29.898 WEAKNESS OF BOTH LOWER EXTREMITIES: ICD-10-CM

## 2022-08-22 DIAGNOSIS — M25.562 CHRONIC PAIN OF LEFT KNEE: ICD-10-CM

## 2022-08-22 DIAGNOSIS — G89.29 CHRONIC PAIN OF LEFT KNEE: ICD-10-CM

## 2022-08-22 PROCEDURE — 97110 THERAPEUTIC EXERCISES: CPT | Mod: PO

## 2022-08-22 PROCEDURE — 97161 PT EVAL LOW COMPLEX 20 MIN: CPT | Mod: PO

## 2022-08-22 NOTE — PLAN OF CARE
[facility]  Physical Therapy Initial Evaluation    Name: Trevor Johnston  Clinic Number: [id number]    Therapy Diagnosis:   Encounter Diagnoses   Name Primary?    Primary osteoarthritis of left knee     Chronic pain of left knee     Weakness of both lower extremities     Decreased activity tolerance      Physician: Daya Laird FNP    Physician Orders: PT Eval and Treat   Medical Diagnosis from Referral: M17.12 (ICD-10-CM) - Primary osteoarthritis of left knee   Evaluation Date: 8/22/2022  Authorization Period Expiration: 08/04/2023   Plan of Care Expiration: 11/4/22  Visit # / Visits authorized: 1/ 1    Time In: 10:00   Time Out: 10:45  Total Billable Time: 10 minutes    Precautions: Diabetes    Subjective   Date of onset: chronic  History of current condition - Trevor reports: Pt reports he has been having knee pain for over a month now. He reports he was digging in his mom's yard and towards the end, he took a step and it imediately started to hurt a lot. It increasingly got worse over the next few days. He started to use a walker because the pain was getting so bad. He went to the dr to get it checked and was told he had arthritis after some xrays were taken. He was given an injection in the knee which did not really help, felt worse for a few days. He then had an MRI done that showed some tearing in the meniscus. He has noticed that his knee pain has improved a lot of the last couple of weeks since the MRI.   A few weeks ago, he was driving his truck and noticed he has a stabbing pain in the side of his knee.   He is not going out on his boat or doing any yard work his knee.   He has previously had knee pain, but cannot remember which knee it was, and they also gave him an injection then and it really helped the pain.      Medical History:   Past Medical History:   Diagnosis Date    Anticoagulant long-term use     ASD (atrial septal defect)     noted on 11/20 ECHO    Diabetes  mellitus, type 2     Fatty liver disease, nonalcoholic     noted on CT    H/O cardiovascular stress test     normal 6/20    Hyperlipidemia     Hypertension     Hyperuricemia     Kidney stones     multiple episodes - on allopurinol    Macular degeneration, right eye     ROBERT (obstructive sleep apnea)     does not use CPAP    PAF (paroxysmal atrial fibrillation)     s/p cardioversion 11/20- pt denies    PFO with atrial septal aneurysm     RLS (restless legs syndrome)     Valvular heart disease     mod MR and mild TR on 6/20 echo       Surgical History:   Trevor Johnston  has a past surgical history that includes Trigger finger release (Bilateral); Rotator cuff repair (Right); Cataract extraction w/  intraocular lens implant (Right, 02/2017); Vasectomy; Cystoureteroscopy with retrograde pyelography and insertion of stent into ureter (Left, 3/5/2020); Laser lithotripsy (Left, 3/5/2020); Extracorporeal shock wave lithotripsy (Left, 7/15/2020); Extracorporeal shock wave lithotripsy (N/A, 8/12/2020); Colonoscopy; Colonoscopy (N/A, 9/30/2021); and Insertion of implantable loop recorder (N/A, 4/15/2022).    Medications:   Trevor has a current medication list which includes the following prescription(s): acetaminophen, allopurinol, atorvastatin, blood sugar diagnostic, blood sugar diagnostic, blood-glucose meter, brimonidine 0.2%, clotrimazole, dorzolamide-timolol 2-0.5%, flecainide, gabapentin, irbesartan, lancets, metformin, metoprolol succinate, oxycodone-acetaminophen, ropinirole, and xarelto, and the following Facility-Administered Medications: lactated ringers, lactated ringers, lidocaine (pf) 10 mg/ml (1%), and lidocaine (pf) 10 mg/ml (1%).    Allergies:   Review of patient's allergies indicates:  No Known Allergies     Imaging, MRI studies: 07/28/2022   Impression:  1. Complex tearing body and posterior horn medial meniscus.  2. Mild quadriceps and patellar tendinopathy.  3. Degenerative change most notable  medial tibiofemoral compartment    Prior Therapy: Yes, for RTC repair  Social History: PT lives with an adult   Occupation: Retired   Prior Level of Function: Independent in all ADLs  Current Level of Function: Remains independent    Pain:  Current 2/10, worst 3/10, best 2/10   Location: left knee   Description: stabbing  Aggravating Factors: is not sure  Easing Factors: tylenol    Pts goals: Return to yard work    Objective     Observation: Rather non-irritable condition of the knee today, difficulty with reproduction of familiar symptoms. No pain with meniscal testing, minimal pain with MMT of of the quad.     Range of Motion:   Knee Left active Left Passive Right Active R passive   Flexion 130  NT   Extension 0 NT 0 NT       Lower Extremity Strength  Right LE  Left LE    Knee extension: 5/5 Knee extension: 5/5   Knee flexion: 5/5 Knee flexion: 5/5   Hip flexion: 5/5 Hip flexion: 5/5   Hip extension:  4+/5 Hip extension: 4+/5   Hip abduction: 4+/5 Hip abduction: 4+/5   Ankle dorsiflexion: 5/5 Ankle dorsiflexion: 5/5   Ankle plantarflexion: 5/5 Ankle plantarflexion: 5/5       Special Tests:   Left Right   Valgus Stress Test (-) (-)   Varus Stress test (-) (-)   Lachman's test (-) (-)   Apley's Compression (-) (-)   Apley's Distraction (-) (-)   Thessaly's Test (-) (-)       Function:  - Sit <--> Stand: Independent  - Bed Mobility: Independent    Joint Mobility: Normal mobility of the patella    Palpation: No TTP    Sensation: Intact to light touch in BLE    Flexibility: Hamstring tightness in BLE      CMS Impairment/Limitation/Restriction for FOTO KNEE Survey    Therapist reviewed FOTO scores for Trevor Johnston on 8/22/2022.   FOTO documents entered into [employer] - see Media section.    Limitation Score: 34%  Category: Mobility         TREATMENT   Treatment Time In: 10:25  Treatment Time Out: 10:35  Total Treatment time separate from Evaluation: 10 minutes    Trevor received therapeutic exercises to  develop strength, ROM and flexibility for 10 minutes including:  Seated hamstring stretch, 1x 30s   Calf stretch, 1x30s  SLR, 1x10  Bridges, 1x10  SL clams w/ green band, 1x10 B  Squats, 1x10 - chair for cue, pain free range  Standing heel raise, 1x10    Home Exercises and Patient Education Provided    Education provided:   - Role of PT, PT POC, PT diagnosis, PT prognosis, HEP    Written Home Exercises Provided: yes.  Exercises were reviewed and Trevor was able to demonstrate them prior to the end of the session.  Trevor demonstrated good  understanding of the education provided.     See EMR under Patient Instructions for exercises provided 8/22/2022.    Assessment   Trevor is a 66 y.o. male referred to outpatient Physical Therapy with a medical diagnosis of M17.12 (ICD-10-CM) - Primary osteoarthritis of left knee. Physical exam is consistent with L knee dysfunction. Primary impairments include AROM, PROM, joint mobility, strength, balance, soft tissue restrictions, and pain which limits functional mobility. This pt is a good candidate for skilled PT tx and stands to benefit from a combination of manual therapy including joint mobilizations with trigger point/myofacscial release, therapeutic exercise to establish core/joint stability, neuromuscular re-education, dry needling and modalities Prn. The pt has been educated on their dx/POC and consents to further PT tx.    Pt prognosis is Good.   Pt will benefit from skilled outpatient Physical Therapy to address the deficits stated above and in the chart below, provide pt/family education, and to maximize pt's level of independence.     Plan of care discussed with patient: Yes  Pt's spiritual, cultural and educational needs considered and patient is agreeable to the plan of care and goals as stated below:     Anticipated Barriers for therapy: None    Medical Necessity is demonstrated by the following  History  Co-morbidities and personal factors that may impact the plan of  care Co-morbidities:   as noted above    Personal Factors:   no deficits     high   Examination  Body Structures and Functions, activity limitations and participation restrictions that may impact the plan of care Body Regions:   lower extremities    Body Systems:    ROM  strength  balance  motor control    Participation Restrictions:   None    Activity limitations:   Learning and applying knowledge  no deficits    General Tasks and Commands  no deficits    Communication  no deficits    Mobility  walking  driving (bike, car, motorcycle)    Self care  looking after one's health    Domestic Life  doing house work (cleaning house, washing dishes, laundry)    Interactions/Relationships  no deficits    Life Areas  no deficits    Community and Social Life  recreation and leisure         moderate   Clinical Presentation stable and uncomplicated low   Decision Making/ Complexity Score: low     Goals:  Short-Term Goals: 4 weeks  - The patient will be independent with initial home exercise program.  - The patient will increase strength to at least 5/5 to perform functional mobility.   - The patient will increase knee ROM grossly to 0-140 to perform ambulation with pain < 2/10.    Long-Term Goals: 8 weeks  - Pt to achieve <28% limitation as measured by the FOTO to demonstrate decreased disability.  - The patient will be independent with home exercise program and symptom management.  - The patient will return to yard work 3x per week without increase in pain.     Plan   Plan of care Certification: 8/22/2022 to 11/4/22.    Outpatient Physical Therapy 2 times weekly for 4 weeks to include the following interventions: Aquatic Therapy, Electrical Stimulation  , Gait Training, Manual Therapy, Moist Heat/ Ice, Neuromuscular Re-ed, Patient Education, Therapeutic Activities and Therapeutic Exercise.     Hakan Worthington, PT

## 2022-08-24 ENCOUNTER — CLINICAL SUPPORT (OUTPATIENT)
Dept: CARDIOLOGY | Facility: HOSPITAL | Age: 67
End: 2022-08-24
Payer: MEDICARE

## 2022-08-24 ENCOUNTER — CLINICAL SUPPORT (OUTPATIENT)
Dept: REHABILITATION | Facility: HOSPITAL | Age: 67
End: 2022-08-24
Payer: MEDICARE

## 2022-08-24 DIAGNOSIS — Z95.818 PRESENCE OF OTHER CARDIAC IMPLANTS AND GRAFTS: ICD-10-CM

## 2022-08-24 DIAGNOSIS — G89.29 CHRONIC PAIN OF LEFT KNEE: Primary | ICD-10-CM

## 2022-08-24 DIAGNOSIS — R68.89 DECREASED ACTIVITY TOLERANCE: ICD-10-CM

## 2022-08-24 DIAGNOSIS — R29.898 WEAKNESS OF BOTH LOWER EXTREMITIES: ICD-10-CM

## 2022-08-24 DIAGNOSIS — M25.562 CHRONIC PAIN OF LEFT KNEE: Primary | ICD-10-CM

## 2022-08-24 PROCEDURE — 97110 THERAPEUTIC EXERCISES: CPT | Mod: PO,CQ

## 2022-08-24 NOTE — PROGRESS NOTES
Physical Therapy Daily Treatment Note     Name: Trevor Johnston  Clinic Number: [id number]    Therapy Diagnosis:   Encounter Diagnoses   Name Primary?    Chronic pain of left knee Yes    Weakness of both lower extremities     Decreased activity tolerance      Physician: Daya Laird FNP    Visit Date: 8/24/2022   Physician Orders: PT Eval and Treat   Medical Diagnosis from Referral: M17.12 (ICD-10-CM) - Primary osteoarthritis of left knee   Evaluation Date: 8/22/2022  Authorization Period Expiration: 08/04/2023   Plan of Care Expiration: 11/4/22  Visit # / Visits authorized: 2/ 1     Time In: 12:00   Time Out: 12:45  Total Billable Time: 45 minutes     Precautions: Diabetes        Subjective     Pt reports: that he is feeling a little better every day. He is w/o c/o knee pn upon arrival. He states that he no longer uses AD for amb.  He was compliant with home exercise program.  Response to previous treatment: no adverse effects  Functional change: decreased pn    Pain: 0/10  Location: left knee      Objective     Trevor received therapeutic exercises to develop strength, endurance, ROM, flexibility, posture and core stabilization for 45 minutes including:  Stat bike x 5 min  GSS 2 min  Seated hamstring stretch, 2 min B  SLR, 2x10 B  Bridges, 2x10  SL clams w/ green band, 2x10 B  Squats, 2x10 - pain free range  Standing heel raise, 2x10  SL Hip ABD B 20x   Lat step-downs 4 inch 20x B  HS sets with slide board 20x B    Trevor received the following manual therapy techniques: Joint mobilizations were applied to the: L knee for 5 minutes, including:  patella medial and sup/inf glides    Trevor participated in neuromuscular re-education activities to improve: Balance, Coordination, Kinesthetic, Sense and Proprioception for 3 minutes. The following activities were included:  NBOS EC 3 x 30 sec, Tandem stance B 3 x 30 sec        Home Exercises Provided and Patient Education Provided     Education provided:   - effects of  therapy    Written Home Exercises Provided: Patient instructed to cont prior HEP.  Exercises were reviewed and Trevor was able to demonstrate them prior to the end of the session.  Trevor demonstrated good  understanding of the education provided.     See EMR under Patient Instructions for exercises provided prior visit.    Assessment     Trevor dinora today's tx with initiation of ther ex well. He was w/o c/o pn throughout tx. Some L LE weakness noted with SLR and lat step-ups. Fair bal with tandem stance B. Patella mobility improves with mob reps, no pn.   Trevor Is progressing well towards his goals.   Pt prognosis is Good.     Pt will continue to benefit from skilled outpatient physical therapy to address the deficits listed in the problem list box on initial evaluation, provide pt/family education and to maximize pt's level of independence in the home and community environment.     Pt's spiritual, cultural and educational needs considered and pt agreeable to plan of care and goals.    Anticipated barriers to physical therapy: none    Goals:   Short-Term Goals: 4 weeks  - The patient will be independent with initial home exercise program.  - The patient will increase strength to at least 5/5 to perform functional mobility.   - The patient will increase knee ROM grossly to 0-140 to perform ambulation with pain < 2/10.     Long-Term Goals: 8 weeks  - Pt to achieve <28% limitation as measured by the FOTO to demonstrate decreased disability.  - The patient will be independent with home exercise program and symptom management.  - The patient will return to yard work 3x per week without increase in pain      Plan     Plan of care Certification: 8/22/2022 to 11/4/22.     Outpatient Physical Therapy 2 times weekly for 4 weeks to include the following interventions: Aquatic Therapy, Electrical Stimulation  , Gait Training, Manual Therapy, Moist Heat/ Ice, Neuromuscular Re-ed, Patient Education, Therapeutic Activities and  Therapeutic Exercise      Cole Marion, PTA

## 2022-08-30 ENCOUNTER — CLINICAL SUPPORT (OUTPATIENT)
Dept: REHABILITATION | Facility: HOSPITAL | Age: 67
End: 2022-08-30
Payer: MEDICARE

## 2022-08-30 DIAGNOSIS — G89.29 CHRONIC PAIN OF LEFT KNEE: Primary | ICD-10-CM

## 2022-08-30 DIAGNOSIS — R68.89 DECREASED ACTIVITY TOLERANCE: ICD-10-CM

## 2022-08-30 DIAGNOSIS — R29.898 WEAKNESS OF BOTH LOWER EXTREMITIES: ICD-10-CM

## 2022-08-30 DIAGNOSIS — M25.562 CHRONIC PAIN OF LEFT KNEE: Primary | ICD-10-CM

## 2022-08-30 PROCEDURE — 97110 THERAPEUTIC EXERCISES: CPT | Mod: PO,CQ

## 2022-08-30 NOTE — PROGRESS NOTES
Physical Therapy Daily Treatment Note     Name: Trevor Johnston  Clinic Number: [id number]    Therapy Diagnosis:   Encounter Diagnoses   Name Primary?    Chronic pain of left knee Yes    Weakness of both lower extremities     Decreased activity tolerance      Physician: Daya Laird FNP    Visit Date: 8/30/2022   Physician Orders: PT Eval and Treat   Medical Diagnosis from Referral: M17.12 (ICD-10-CM) - Primary osteoarthritis of left knee   Evaluation Date: 8/22/2022  Authorization Period Expiration: 08/04/2023   Plan of Care Expiration: 11/4/22  Visit # / Visits authorized: 3/ 19     Time In: 11:45   Time Out: 12:33  Total Billable Time: 45 minutes     Precautions: Diabetes        Subjective     Pt reports: that his knee is feeling good and he is w/o c/o knee pn upon arrival. .  He was compliant with home exercise program.  Response to previous treatment: no adverse effects  Functional change: decreased pn    Pain: 0/10  Location: left knee      Objective     Trevor received therapeutic exercises to develop strength, endurance, ROM, flexibility, posture and core stabilization for 40 minutes including:  Stat bike x 5 min  GSS 2 min  Seated hamstring stretch, 2 min B  SLR, 2x10 B 2#  Bridges, 2x10  SL clams w/ blue band, 2x10 B  Squats, 2x10 - pain free range  Standing heel raise, 2x10  SL Hip ABD B 20x 2#  Lat step-downs 4 inch 20x B  HS sets with slide board 20x B    Trevor received the following manual therapy techniques: Joint mobilizations were applied to the: L knee for 5 minutes, including:  patella medial and sup/inf glides    Trevor participated in neuromuscular re-education activities to improve: Balance, Coordination, Kinesthetic, Sense and Proprioception for 3 minutes. The following activities were included:  NBOS EC 3 x 30 sec, Tandem stance B 3 x 30 sec        Home Exercises Provided and Patient Education Provided     Education provided:   - effects of therapy    Written Home Exercises Provided: Patient  instructed to cont prior HEP.  Exercises were reviewed and Trevor was able to demonstrate them prior to the end of the session.  Trevor demonstrated good  understanding of the education provided.     See EMR under Patient Instructions for exercises provided prior visit.    Assessment     Trevor dinora today's tx with progression of ther ex well. He was able to progress with resistance w/o c/o pn throughout tx. Some L LE weakness noted with  lat step-ups. Fair bal with tandem stance B. Patella mobility improves with mob reps, no pn.   Trevor Is progressing well towards his goals.   Pt prognosis is Good.     Pt will continue to benefit from skilled outpatient physical therapy to address the deficits listed in the problem list box on initial evaluation, provide pt/family education and to maximize pt's level of independence in the home and community environment.     Pt's spiritual, cultural and educational needs considered and pt agreeable to plan of care and goals.    Anticipated barriers to physical therapy: none    Goals:   Short-Term Goals: 4 weeks  - The patient will be independent with initial home exercise program.  - The patient will increase strength to at least 5/5 to perform functional mobility.   - The patient will increase knee ROM grossly to 0-140 to perform ambulation with pain < 2/10.     Long-Term Goals: 8 weeks  - Pt to achieve <28% limitation as measured by the FOTO to demonstrate decreased disability.  - The patient will be independent with home exercise program and symptom management.  - The patient will return to yard work 3x per week without increase in pain      Plan     Plan of care Certification: 8/22/2022 to 11/4/22.     Outpatient Physical Therapy 2 times weekly for 4 weeks to include the following interventions: Aquatic Therapy, Electrical Stimulation  , Gait Training, Manual Therapy, Moist Heat/ Ice, Neuromuscular Re-ed, Patient Education, Therapeutic Activities and Therapeutic Exercise      Cole  Doni, PTA

## 2022-09-06 ENCOUNTER — CLINICAL SUPPORT (OUTPATIENT)
Dept: REHABILITATION | Facility: HOSPITAL | Age: 67
End: 2022-09-06
Payer: MEDICARE

## 2022-09-06 DIAGNOSIS — G89.29 CHRONIC PAIN OF LEFT KNEE: Primary | ICD-10-CM

## 2022-09-06 DIAGNOSIS — M25.562 CHRONIC PAIN OF LEFT KNEE: Primary | ICD-10-CM

## 2022-09-06 DIAGNOSIS — R29.898 WEAKNESS OF BOTH LOWER EXTREMITIES: ICD-10-CM

## 2022-09-06 DIAGNOSIS — R68.89 DECREASED ACTIVITY TOLERANCE: ICD-10-CM

## 2022-09-06 PROCEDURE — 97110 THERAPEUTIC EXERCISES: CPT | Mod: PO,CQ

## 2022-09-06 NOTE — PROGRESS NOTES
Physical Therapy Daily Treatment Note     Name: Trevor Johnston  Clinic Number: [id number]    Therapy Diagnosis:   Encounter Diagnoses   Name Primary?    Chronic pain of left knee Yes    Weakness of both lower extremities     Decreased activity tolerance      Physician: Daya Laird FNP    Visit Date: 9/6/2022   Physician Orders: PT Eval and Treat   Medical Diagnosis from Referral: M17.12 (ICD-10-CM) - Primary osteoarthritis of left knee   Evaluation Date: 8/22/2022  Authorization Period Expiration: 08/04/2023   Plan of Care Expiration: 11/4/22  Visit # / Visits authorized: 4 19     Time In: 10:49  Time Out: 11:35  Total Billable Time: 45 minutes     Precautions: Diabetes        Subjective     Pt reports: that his knee is feeling good today,no knee pn upon arrival. .  He was compliant with home exercise program.  Response to previous treatment: no adverse effects  Functional change: decreased pn    Pain: 0/10  Location: left knee      Objective     Trevor received therapeutic exercises to develop strength, endurance, ROM, flexibility, posture and core stabilization for 40 minutes including:  Stat bike x 5 min  GSS 2 min  Seated hamstring stretch, 2 min B  SLR, 2x10 B 2#  Bridges, 2x10  SL clams w/ blue band, 2x10 B  Squats, 2x10 -   Standing heel raise, 2x10  SL Hip ABD B 20x 2#  Lat step-downs 4 inch 20x B  Knee ext challenges with slide board 20x B    Trevor received the following manual therapy techniques: Joint mobilizations were applied to the: L knee for 5 minutes, including:  patella medial and sup/inf glides    Trevor participated in neuromuscular re-education activities to improve: Balance, Coordination, Kinesthetic, Sense and Proprioception for 3 minutes. The following activities were included:  NBOS EC 3 x 30 sec on foam, Tandem stance B 3 x 30 sec        Home Exercises Provided and Patient Education Provided     Education provided:   - effects of therapy    Written Home Exercises Provided: Patient  instructed to cont prior HEP.  Exercises were reviewed and Trevor was able to demonstrate them prior to the end of the session.  Trevor demonstrated good  understanding of the education provided.     See EMR under Patient Instructions for exercises provided prior visit.    Assessment     Trevor dinora today's tx with  ther ex well. He was able to perform all exs w/o c/o pn throughout tx. Some L LE weakness noted with  lat step-ups. Fair bal with tandem stance B. Patella mobility improves with mob reps, no pn.   Trevor Is progressing well towards his goals.   Pt prognosis is Good.     Pt will continue to benefit from skilled outpatient physical therapy to address the deficits listed in the problem list box on initial evaluation, provide pt/family education and to maximize pt's level of independence in the home and community environment.     Pt's spiritual, cultural and educational needs considered and pt agreeable to plan of care and goals.    Anticipated barriers to physical therapy: none    Goals:   Short-Term Goals: 4 weeks  - The patient will be independent with initial home exercise program.  - The patient will increase strength to at least 5/5 to perform functional mobility.   - The patient will increase knee ROM grossly to 0-140 to perform ambulation with pain < 2/10.     Long-Term Goals: 8 weeks  - Pt to achieve <28% limitation as measured by the FOTO to demonstrate decreased disability.  - The patient will be independent with home exercise program and symptom management.  - The patient will return to yard work 3x per week without increase in pain      Plan     Plan of care Certification: 8/22/2022 to 11/4/22.     Outpatient Physical Therapy 2 times weekly for 4 weeks to include the following interventions: Aquatic Therapy, Electrical Stimulation  , Gait Training, Manual Therapy, Moist Heat/ Ice, Neuromuscular Re-ed, Patient Education, Therapeutic Activities and Therapeutic Exercise      Cole Marion, [facility]

## 2022-09-07 PROBLEM — Z95.818 STATUS POST PLACEMENT OF IMPLANTABLE LOOP RECORDER: Status: ACTIVE | Noted: 2022-09-07

## 2022-09-08 ENCOUNTER — CLINICAL SUPPORT (OUTPATIENT)
Dept: REHABILITATION | Facility: HOSPITAL | Age: 67
End: 2022-09-08
Payer: MEDICARE

## 2022-09-08 DIAGNOSIS — M25.562 CHRONIC PAIN OF LEFT KNEE: Primary | ICD-10-CM

## 2022-09-08 DIAGNOSIS — R29.898 WEAKNESS OF BOTH LOWER EXTREMITIES: ICD-10-CM

## 2022-09-08 DIAGNOSIS — R68.89 DECREASED ACTIVITY TOLERANCE: ICD-10-CM

## 2022-09-08 DIAGNOSIS — G89.29 CHRONIC PAIN OF LEFT KNEE: Primary | ICD-10-CM

## 2022-09-08 PROCEDURE — 97110 THERAPEUTIC EXERCISES: CPT | Mod: PO,CQ

## 2022-09-08 NOTE — PROGRESS NOTES
Physical Therapy Daily Treatment Note     Name: Trevor Johnston  Clinic Number: [id number]    Therapy Diagnosis:   Encounter Diagnoses   Name Primary?    Chronic pain of left knee Yes    Weakness of both lower extremities     Decreased activity tolerance      Physician: Daya Laird FNP    Visit Date: 9/8/2022   Physician Orders: PT Eval and Treat   Medical Diagnosis from Referral: M17.12 (ICD-10-CM) - Primary osteoarthritis of left knee   Evaluation Date: 8/22/2022  Authorization Period Expiration: 08/04/2023   Plan of Care Expiration: 11/4/22  Visit # / Visits authorized: 5/19     Time In: 11;15  Time Out: 12:00  Total Billable Time: 45 minutes     Precautions: Diabetes        Subjective     Pt reports: that his knee is feeling good and he is w/o complaint today, .  He was compliant with home exercise program.  Response to previous treatment: no adverse effects  Functional change: decreased pn    Pain: 0/10  Location: left knee      Objective     Trevor received therapeutic exercises to develop strength, endurance, ROM, flexibility, posture and core stabilization for 42 minutes including:  Stat bike x 5 min  GSS 2 min  Seated hamstring stretch, 2 min B  SLR, 2x10 B 2#  LAQ 2# 20x  HS Curl 2# 20x  Bridges, 2x10 on green ball  SL clams w/ blue band, 2x10 B  Leg Press 50# 20x-   Calf press 50# 20x  SL Hip ABD B 20x 2#  Lat step-downs 6 inch 20x B  Knee ext challenges with slide board 20x B    Trevor received the following manual therapy techniques: Joint mobilizations were applied to the: L knee for 0 minutes, including:  patella medial and sup/inf glides    Trevor participated in neuromuscular re-education activities to improve: Balance, Coordination, Kinesthetic, Sense and Proprioception for 3 minutes. The following activities were included:  NBOS EC 3 x 30 sec on foam, Tandem stance B 3 x 30 sec        Home Exercises Provided and Patient Education Provided     Education provided:   - effects of therapy    Written  Home Exercises Provided: Patient instructed to cont prior HEP.  Exercises were reviewed and Trevor was able to demonstrate them prior to the end of the session.  Trevor demonstrated good  understanding of the education provided.     See EMR under Patient Instructions for exercises provided prior visit.    Assessment     Trevor dinora today's tx with progression of ther ex well. He was able to progress with strengthening on machines today and adding open chain exs w/o c/o pn throughout tx. Some L LE weakness noted with  SLR. Fair bal with tandem stance B. Patella mobility improves with mob reps, no pn.   Trevor Is progressing well towards his goals.   Pt prognosis is Good.     Pt will continue to benefit from skilled outpatient physical therapy to address the deficits listed in the problem list box on initial evaluation, provide pt/family education and to maximize pt's level of independence in the home and community environment.     Pt's spiritual, cultural and educational needs considered and pt agreeable to plan of care and goals.    Anticipated barriers to physical therapy: none    Goals:   Short-Term Goals: 4 weeks  - The patient will be independent with initial home exercise program.  - The patient will increase strength to at least 5/5 to perform functional mobility.   - The patient will increase knee ROM grossly to 0-140 to perform ambulation with pain < 2/10.     Long-Term Goals: 8 weeks  - Pt to achieve <28% limitation as measured by the FOTO to demonstrate decreased disability.  - The patient will be independent with home exercise program and symptom management.  - The patient will return to yard work 3x per week without increase in pain      Plan     Plan of care Certification: 8/22/2022 to 11/4/22.     Outpatient Physical Therapy 2 times weekly for 4 weeks to include the following interventions: Aquatic Therapy, Electrical Stimulation  , Gait Training, Manual Therapy, Moist Heat/ Ice, Neuromuscular Re-ed,  Patient Education, Therapeutic Activities and Therapeutic Exercise      Cole Marion, PTA

## 2022-09-15 ENCOUNTER — CLINICAL SUPPORT (OUTPATIENT)
Dept: REHABILITATION | Facility: HOSPITAL | Age: 67
End: 2022-09-15
Payer: MEDICARE

## 2022-09-15 DIAGNOSIS — R29.898 WEAKNESS OF BOTH LOWER EXTREMITIES: ICD-10-CM

## 2022-09-15 DIAGNOSIS — M25.562 CHRONIC PAIN OF LEFT KNEE: Primary | ICD-10-CM

## 2022-09-15 DIAGNOSIS — G89.29 CHRONIC PAIN OF LEFT KNEE: Primary | ICD-10-CM

## 2022-09-15 DIAGNOSIS — R68.89 DECREASED ACTIVITY TOLERANCE: ICD-10-CM

## 2022-09-15 PROCEDURE — 97110 THERAPEUTIC EXERCISES: CPT | Mod: PO

## 2022-09-15 NOTE — PROGRESS NOTES
Physical Therapy Daily Treatment Note     Name: Trevor Johnston  Clinic Number: [id number]    Therapy Diagnosis:   Encounter Diagnoses   Name Primary?    Chronic pain of left knee Yes    Weakness of both lower extremities     Decreased activity tolerance      Physician: Daya Laird FNP    Visit Date: 9/15/2022   Physician Orders: PT Eval and Treat   Medical Diagnosis from Referral: M17.12 (ICD-10-CM) - Primary osteoarthritis of left knee   Evaluation Date: 8/22/2022  Authorization Period Expiration: 12/31/2022  Plan of Care Expiration: 11/4/22  Visit # / Visits authorized: 5 / 19     Time In: 10:00  Time Out: 10:40  Total Billable Time: 40 minutes     Precautions: Diabetes    Subjective     Pt reports: he has been doing well. No pain to start the session and has not had any pain in a while. He has not yet returned to heavy labor activities, but has been moving around much better. He has been doing the HEP without issue.   He was compliant with home exercise program.  Response to previous treatment: no adverse effects  Functional change: decreased pn    Pain: 0/10  Location: left knee      Objective     Trevor received therapeutic exercises to develop strength, endurance, ROM, flexibility, posture and core stabilization for 40 minutes including:  Stat bike x 5 min  GSS 2 min  Seated hamstring stretch, 2 min B  SLR, 3x10 B 2#  SL Hip ABD B 20x 2#   LAQ 2# 20x - not performed  HS Curl 2# 20x - not performed  Bridges, 2x10 on green ball  SL clams w/ blue band, 2x10 B  Leg Press 50# 30x  Calf press 50# 30x  Precor leg ext, 2x10 20#  Precor leg ext, 2x10 30#  Lat step-downs 6 inch 20x B  Knee ext challenges with slide board 20x B - not performed    Trevor received the following manual therapy techniques: Joint mobilizations were applied to the: L knee for 00 minutes, including:  patella medial and sup/inf glides    Trevor participated in neuromuscular re-education activities to improve: Balance, Coordination, Kinesthetic,  Sense and Proprioception for 00 minutes. The following activities were included:  NBOS EC 3 x 30 sec on foam, Tandem stance B 3 x 30 sec      Home Exercises Provided and Patient Education Provided     Education provided:   - effects of therapy    Written Home Exercises Provided: Patient instructed to cont prior HEP.  Exercises were reviewed and Trevor was able to demonstrate them prior to the end of the session.  Trevor demonstrated good  understanding of the education provided.     See EMR under Patient Instructions for exercises provided prior visit.    Assessment     Pt tolerated tx well, no exacerbation of symptoms. Progress intensity of treatment without issue. Discussed continuing with HEP at this time, pt agreeable to this plan. Will discharge pt in 30 days if no follow up is schedule by that time.   Trevor Is progressing well towards his goals.   Pt prognosis is Good.     Pt will continue to benefit from skilled outpatient physical therapy to address the deficits listed in the problem list box on initial evaluation, provide pt/family education and to maximize pt's level of independence in the home and community environment.     Pt's spiritual, cultural and educational needs considered and pt agreeable to plan of care and goals.    Anticipated barriers to physical therapy: none    Goals:   Short-Term Goals: 4 weeks  - The patient will be independent with initial home exercise program.  - The patient will increase strength to at least 5/5 to perform functional mobility.   - The patient will increase knee ROM grossly to 0-140 to perform ambulation with pain < 2/10.     Long-Term Goals: 8 weeks  - Pt to achieve <28% limitation as measured by the FOTO to demonstrate decreased disability.  - The patient will be independent with home exercise program and symptom management.  - The patient will return to yard work 3x per week without increase in pain    Plan     Plan of care Certification: 8/22/2022 to 11/4/22.      Outpatient Physical Therapy 2 times weekly for 4 weeks to include the following interventions: Aquatic Therapy, Electrical Stimulation  , Gait Training, Manual Therapy, Moist Heat/ Ice, Neuromuscular Re-ed, Patient Education, Therapeutic Activities and Therapeutic Exercise      Hakan Worthington, PT

## 2022-09-23 ENCOUNTER — LAB VISIT (OUTPATIENT)
Dept: LAB | Facility: HOSPITAL | Age: 67
End: 2022-09-23
Attending: FAMILY MEDICINE
Payer: MEDICARE

## 2022-09-23 ENCOUNTER — CLINICAL SUPPORT (OUTPATIENT)
Dept: CARDIOLOGY | Facility: HOSPITAL | Age: 67
End: 2022-09-23
Attending: FAMILY MEDICINE
Payer: MEDICARE

## 2022-09-23 DIAGNOSIS — Z95.818 PRESENCE OF OTHER CARDIAC IMPLANTS AND GRAFTS: ICD-10-CM

## 2022-09-23 DIAGNOSIS — E11.69 TYPE 2 DIABETES MELLITUS WITH OTHER SPECIFIED COMPLICATION, WITHOUT LONG-TERM CURRENT USE OF INSULIN: ICD-10-CM

## 2022-09-23 LAB
ALBUMIN SERPL BCP-MCNC: 3.9 G/DL (ref 3.5–5.2)
ALP SERPL-CCNC: 89 U/L (ref 55–135)
ALT SERPL W/O P-5'-P-CCNC: 30 U/L (ref 10–44)
ANION GAP SERPL CALC-SCNC: 7 MMOL/L (ref 8–16)
AST SERPL-CCNC: 21 U/L (ref 10–40)
BASOPHILS # BLD AUTO: 0.09 K/UL (ref 0–0.2)
BASOPHILS NFR BLD: 1.1 % (ref 0–1.9)
BILIRUB SERPL-MCNC: 0.4 MG/DL (ref 0.1–1)
BUN SERPL-MCNC: 12 MG/DL (ref 8–23)
CALCIUM SERPL-MCNC: 9.4 MG/DL (ref 8.7–10.5)
CHLORIDE SERPL-SCNC: 106 MMOL/L (ref 95–110)
CO2 SERPL-SCNC: 26 MMOL/L (ref 23–29)
CREAT SERPL-MCNC: 0.8 MG/DL (ref 0.5–1.4)
DIFFERENTIAL METHOD: ABNORMAL
EOSINOPHIL # BLD AUTO: 0.2 K/UL (ref 0–0.5)
EOSINOPHIL NFR BLD: 2.9 % (ref 0–8)
ERYTHROCYTE [DISTWIDTH] IN BLOOD BY AUTOMATED COUNT: 13.2 % (ref 11.5–14.5)
EST. GFR  (NO RACE VARIABLE): >60 ML/MIN/1.73 M^2
ESTIMATED AVG GLUCOSE: 189 MG/DL (ref 68–131)
GLUCOSE SERPL-MCNC: 149 MG/DL (ref 70–110)
HBA1C MFR BLD: 8.2 % (ref 4–5.6)
HCT VFR BLD AUTO: 39.7 % (ref 40–54)
HGB BLD-MCNC: 12.7 G/DL (ref 14–18)
IMM GRANULOCYTES # BLD AUTO: 0.05 K/UL (ref 0–0.04)
IMM GRANULOCYTES NFR BLD AUTO: 0.6 % (ref 0–0.5)
LYMPHOCYTES # BLD AUTO: 1.8 K/UL (ref 1–4.8)
LYMPHOCYTES NFR BLD: 21.2 % (ref 18–48)
MCH RBC QN AUTO: 29.7 PG (ref 27–31)
MCHC RBC AUTO-ENTMCNC: 32 G/DL (ref 32–36)
MCV RBC AUTO: 93 FL (ref 82–98)
MONOCYTES # BLD AUTO: 0.8 K/UL (ref 0.3–1)
MONOCYTES NFR BLD: 9.2 % (ref 4–15)
NEUTROPHILS # BLD AUTO: 5.5 K/UL (ref 1.8–7.7)
NEUTROPHILS NFR BLD: 65 % (ref 38–73)
NRBC BLD-RTO: 0 /100 WBC
PLATELET # BLD AUTO: 245 K/UL (ref 150–450)
PMV BLD AUTO: 10.5 FL (ref 9.2–12.9)
POTASSIUM SERPL-SCNC: 4 MMOL/L (ref 3.5–5.1)
PROT SERPL-MCNC: 7.1 G/DL (ref 6–8.4)
RBC # BLD AUTO: 4.28 M/UL (ref 4.6–6.2)
SODIUM SERPL-SCNC: 139 MMOL/L (ref 136–145)
URATE SERPL-MCNC: 3.4 MG/DL (ref 3.4–7)
WBC # BLD AUTO: 8.4 K/UL (ref 3.9–12.7)

## 2022-09-23 PROCEDURE — 36415 COLL VENOUS BLD VENIPUNCTURE: CPT | Mod: PO | Performed by: FAMILY MEDICINE

## 2022-09-23 PROCEDURE — 83036 HEMOGLOBIN GLYCOSYLATED A1C: CPT | Performed by: FAMILY MEDICINE

## 2022-09-23 PROCEDURE — 80053 COMPREHEN METABOLIC PANEL: CPT | Performed by: FAMILY MEDICINE

## 2022-09-23 PROCEDURE — 84550 ASSAY OF BLOOD/URIC ACID: CPT | Performed by: FAMILY MEDICINE

## 2022-09-23 PROCEDURE — 93298 REM INTERROG DEV EVAL SCRMS: CPT | Mod: ,,, | Performed by: INTERNAL MEDICINE

## 2022-09-23 PROCEDURE — 85025 COMPLETE CBC W/AUTO DIFF WBC: CPT | Performed by: FAMILY MEDICINE

## 2022-09-23 PROCEDURE — 93298 CARDIAC DEVICE CHECK - REMOTE: ICD-10-PCS | Mod: ,,, | Performed by: INTERNAL MEDICINE

## 2022-09-25 ENCOUNTER — PATIENT MESSAGE (OUTPATIENT)
Dept: FAMILY MEDICINE | Facility: CLINIC | Age: 67
End: 2022-09-25
Payer: MEDICARE

## 2022-09-29 ENCOUNTER — OFFICE VISIT (OUTPATIENT)
Dept: FAMILY MEDICINE | Facility: CLINIC | Age: 67
End: 2022-09-29
Payer: MEDICARE

## 2022-09-29 VITALS
HEIGHT: 69 IN | RESPIRATION RATE: 20 BRPM | SYSTOLIC BLOOD PRESSURE: 132 MMHG | WEIGHT: 183.75 LBS | OXYGEN SATURATION: 97 % | HEART RATE: 65 BPM | BODY MASS INDEX: 27.22 KG/M2 | DIASTOLIC BLOOD PRESSURE: 68 MMHG | TEMPERATURE: 98 F

## 2022-09-29 DIAGNOSIS — G25.81 RLS (RESTLESS LEGS SYNDROME): Primary | ICD-10-CM

## 2022-09-29 DIAGNOSIS — G62.9 NEUROPATHY: ICD-10-CM

## 2022-09-29 DIAGNOSIS — E11.69 TYPE 2 DIABETES MELLITUS WITH OTHER SPECIFIED COMPLICATION, WITHOUT LONG-TERM CURRENT USE OF INSULIN: ICD-10-CM

## 2022-09-29 PROCEDURE — 99214 PR OFFICE/OUTPT VISIT, EST, LEVL IV, 30-39 MIN: ICD-10-PCS | Mod: S$GLB,,, | Performed by: FAMILY MEDICINE

## 2022-09-29 PROCEDURE — 90694 VACC AIIV4 NO PRSRV 0.5ML IM: CPT | Mod: S$GLB,,, | Performed by: FAMILY MEDICINE

## 2022-09-29 PROCEDURE — G0008 ADMIN INFLUENZA VIRUS VAC: HCPCS | Mod: S$GLB,,, | Performed by: FAMILY MEDICINE

## 2022-09-29 PROCEDURE — 90694 FLU VACCINE - QUADRIVALENT - ADJUVANTED: ICD-10-PCS | Mod: S$GLB,,, | Performed by: FAMILY MEDICINE

## 2022-09-29 PROCEDURE — G0008 FLU VACCINE - QUADRIVALENT - ADJUVANTED: ICD-10-PCS | Mod: S$GLB,,, | Performed by: FAMILY MEDICINE

## 2022-09-29 PROCEDURE — 99214 OFFICE O/P EST MOD 30 MIN: CPT | Mod: S$GLB,,, | Performed by: FAMILY MEDICINE

## 2022-09-29 RX ORDER — METFORMIN HYDROCHLORIDE 500 MG/1
TABLET, EXTENDED RELEASE ORAL
Qty: 270 TABLET | Refills: 1 | Status: SHIPPED | OUTPATIENT
Start: 2022-09-29 | End: 2023-03-13 | Stop reason: SDUPTHER

## 2022-10-02 NOTE — PROGRESS NOTES
Subjective:       Patient ID: Trevor Johnston is a 66 y.o. male.    Chief Complaint: Annual Exam    HPI  The patient is a 66-year-old who is here today for his 6 month visit.  Overall, he is doing well although he is discouraged about his recent A1c.      Today we discussed the followin)  Diabetes.  His recent A1c was 8.2 up from prior value of 5.6.  He was previously on Glucophage and Amaryl but those were stopped due to his improved control.  He has not been eating well and has felt that his diet did not have to be as strict as it was before since his A1c was doing so well.  2) hypertension.  Today his blood pressure is 132/68.  He is taking Avapro and Toprol which he is tolerating well  3) RLS.  He finds that the Mirapex is working well for his restless legs  4) neuropathy.  He feels that the Neurontin is his helping the pain in his feet at night as it is no longer an issue with the Neurontin  5) history of nephrolithiasis.  He is taking the allopurinol for his stones and has not had any recent stones  6) rectal bleeding.  He has had some recurrent issues with rectal bleeding again.  We had discussed this at our last visit and he had repeat banding in May (which followed banding at the time of his colonoscopy in September).  Initially that worked well but now he is having bright red bleeding from the rectum again.  His recent labs showed mild anemia with a hemoglobin of 12.7 and hematocrit of 39.7.  7) PAF.  He continues to follow with the cardiologist.  He does have his loop in place.  He does have occasional episodes of AFib.  He is taking Xarelto and flecainide      Review of Systems   Constitutional:  Negative for appetite change, chills, diaphoresis, fatigue, fever and unexpected weight change.   HENT:  Negative for congestion, ear pain, postnasal drip, rhinorrhea, sinus pressure, sneezing, sore throat and trouble swallowing.    Eyes:  Negative for pain, discharge and visual disturbance.    Respiratory:  Negative for cough, chest tightness, shortness of breath and wheezing.    Cardiovascular:  Negative for chest pain, palpitations and leg swelling.   Gastrointestinal:  Positive for anal bleeding. Negative for abdominal distention, abdominal pain, blood in stool, constipation, diarrhea, nausea and vomiting.   Skin:  Negative for rash.       Objective:      Physical Exam  Constitutional:       General: He is not in acute distress.     Appearance: Normal appearance. He is well-developed.   HENT:      Head: Normocephalic and atraumatic.      Right Ear: Hearing, tympanic membrane, ear canal and external ear normal.      Left Ear: Hearing, tympanic membrane, ear canal and external ear normal.      Nose: Nose normal.      Mouth/Throat:      Mouth: No oral lesions.      Pharynx: No oropharyngeal exudate or posterior oropharyngeal erythema.   Eyes:      General: Lids are normal. No scleral icterus.     Extraocular Movements: Extraocular movements intact.      Conjunctiva/sclera: Conjunctivae normal.      Pupils: Pupils are equal, round, and reactive to light.   Neck:      Thyroid: No thyroid mass or thyromegaly.      Vascular: No carotid bruit.   Cardiovascular:      Rate and Rhythm: Normal rate and regular rhythm. No extrasystoles are present.     Chest Wall: PMI is not displaced.      Heart sounds: Normal heart sounds. No murmur heard.    No gallop.   Pulmonary:      Effort: Pulmonary effort is normal. No accessory muscle usage or respiratory distress.      Breath sounds: Normal breath sounds.   Abdominal:      General: Bowel sounds are normal. There is no abdominal bruit.      Palpations: Abdomen is soft.      Tenderness: There is no abdominal tenderness. There is no rebound.   Musculoskeletal:      Cervical back: Normal range of motion and neck supple.   Lymphadenopathy:      Head:      Right side of head: No submental or submandibular adenopathy.      Left side of head: No submental or submandibular  "adenopathy.      Cervical:      Right cervical: No superficial, deep or posterior cervical adenopathy.     Left cervical: No superficial, deep or posterior cervical adenopathy.      Upper Body:      Right upper body: No supraclavicular adenopathy.      Left upper body: No supraclavicular adenopathy.   Skin:     General: Skin is warm and dry.   Neurological:      Mental Status: He is alert and oriented to person, place, and time.     Blood pressure 132/68, pulse 65, temperature 98.2 °F (36.8 °C), resp. rate 20, height 5' 9" (1.753 m), weight 83.3 kg (183 lb 12.1 oz), SpO2 97 %.Body mass index is 27.14 kg/m².              A/P:  1)  Diabetes.  Uncontrolled.  We will resume Glucophage at 500 mg in the morning and 1000 mg in the evening.  He will let me know how his sugars are doing in 3 weeks so we can make further adjustments.  We will plan to check an A1c in December.  2) hypertension.  Well controlled.  Continue with Avapro and Toprol   3) RLS.  Well controlled.  Continue with Mirapex   4) neuropathy.  Well controlled.  Continue with Neurontin   5) history of nephrolithiasis.  Asymptomatic.  Continue with allopurinol   6) rectal bleeding likely due to hemorrhoids.  Recurrent.  We will schedule follow-up with Dr. Mcgraw  7) anemia likely due to rectal bleeding.  Mild.  We will be addressing the rectal bleeding and monitoring his CBC  8) PAF.  Asymptomatic.  Follow-up with Cardiology continue with with  Xarelto and flecainide  under their direction  9) health maintenance issues.  He did receive his flu shot today.    "

## 2022-10-13 ENCOUNTER — PATIENT MESSAGE (OUTPATIENT)
Dept: FAMILY MEDICINE | Facility: CLINIC | Age: 67
End: 2022-10-13
Payer: MEDICARE

## 2022-10-13 DIAGNOSIS — R80.9 TYPE 2 DIABETES MELLITUS WITH MICROALBUMINURIA, WITHOUT LONG-TERM CURRENT USE OF INSULIN: Primary | ICD-10-CM

## 2022-10-13 DIAGNOSIS — E11.29 TYPE 2 DIABETES MELLITUS WITH MICROALBUMINURIA, WITHOUT LONG-TERM CURRENT USE OF INSULIN: Primary | ICD-10-CM

## 2022-10-14 NOTE — TELEPHONE ENCOUNTER
No new care gaps identified.  [facility] Embedded Care Gaps. Reference number: [id number]. 10/14/2022   10:41:08 AM SHANTELT

## 2022-10-15 RX ORDER — INSULIN PUMP SYRINGE, 3 ML
EACH MISCELLANEOUS
Qty: 1 EACH | Refills: 0 | Status: SHIPPED | OUTPATIENT
Start: 2022-10-15 | End: 2022-10-19 | Stop reason: SDUPTHER

## 2022-10-15 RX ORDER — LANCETS
EACH MISCELLANEOUS
Qty: 200 EACH | Refills: 1 | Status: SHIPPED | OUTPATIENT
Start: 2022-10-15 | End: 2023-10-23

## 2022-10-18 ENCOUNTER — PATIENT MESSAGE (OUTPATIENT)
Dept: FAMILY MEDICINE | Facility: CLINIC | Age: 67
End: 2022-10-18
Payer: MEDICARE

## 2022-10-18 DIAGNOSIS — E11.29 TYPE 2 DIABETES MELLITUS WITH MICROALBUMINURIA, WITHOUT LONG-TERM CURRENT USE OF INSULIN: ICD-10-CM

## 2022-10-18 DIAGNOSIS — R80.9 TYPE 2 DIABETES MELLITUS WITH MICROALBUMINURIA, WITHOUT LONG-TERM CURRENT USE OF INSULIN: ICD-10-CM

## 2022-10-19 ENCOUNTER — OFFICE VISIT (OUTPATIENT)
Dept: SURGERY | Facility: CLINIC | Age: 67
End: 2022-10-19
Payer: MEDICARE

## 2022-10-19 VITALS
HEIGHT: 69 IN | BODY MASS INDEX: 27.3 KG/M2 | TEMPERATURE: 98 F | SYSTOLIC BLOOD PRESSURE: 127 MMHG | WEIGHT: 184.31 LBS | DIASTOLIC BLOOD PRESSURE: 77 MMHG | HEART RATE: 70 BPM

## 2022-10-19 DIAGNOSIS — K64.9 BLEEDING HEMORRHOIDS: Primary | ICD-10-CM

## 2022-10-19 PROCEDURE — 99999 PR PBB SHADOW E&M-EST. PATIENT-LVL IV: CPT | Mod: PBBFAC,,, | Performed by: SURGERY

## 2022-10-19 PROCEDURE — 99213 PR OFFICE/OUTPT VISIT, EST, LEVL III, 20-29 MIN: ICD-10-PCS | Mod: S$PBB,,, | Performed by: SURGERY

## 2022-10-19 PROCEDURE — 99213 OFFICE O/P EST LOW 20 MIN: CPT | Mod: S$PBB,,, | Performed by: SURGERY

## 2022-10-19 PROCEDURE — 99999 PR PBB SHADOW E&M-EST. PATIENT-LVL IV: ICD-10-PCS | Mod: PBBFAC,,, | Performed by: SURGERY

## 2022-10-19 PROCEDURE — 99214 OFFICE O/P EST MOD 30 MIN: CPT | Mod: PBBFAC,PO | Performed by: SURGERY

## 2022-10-19 NOTE — TELEPHONE ENCOUNTER
No new care gaps identified.  [facility] Embedded Care Gaps. Reference number: [id number]. 10/19/2022   11:34:38 AM SHANTELT

## 2022-10-19 NOTE — PROGRESS NOTES
Pleasant 67-year-old gentleman whom I have seen in the past.  I have banding him previously.  He was last banded by me in May of this year.  When I saw him in July he noted his symptoms had improved significantly was no longer having any bleeding.  He returns today noting he is having occasional bleeding bowel movements.  Notes that it occurs 2-3 times per week.  States it is much less than it was previous but still is occurring.  Would like to consider repeat banding.  Of note he has been on Xarelto and has not stopped that.    Afebrile vital signs stable   Awake alert orient x3   Abdomen soft nontender nondistended   Rectal deferred as patient notes he would like does have exam at time were we can do banding.    Assessment:  Rectal bleeding    Discussed with patient likely source of his bleeding is from his hemorrhoids.  I have recommended consideration for repeat banding.  He would like to have repeat banding.  He will return in 2 weeks when he has been off his Xarelto for a few days to allow the banding to be performed.

## 2022-10-21 ENCOUNTER — PATIENT MESSAGE (OUTPATIENT)
Dept: FAMILY MEDICINE | Facility: CLINIC | Age: 67
End: 2022-10-21
Payer: MEDICARE

## 2022-10-21 ENCOUNTER — TELEPHONE (OUTPATIENT)
Dept: FAMILY MEDICINE | Facility: CLINIC | Age: 67
End: 2022-10-21
Payer: MEDICARE

## 2022-10-21 RX ORDER — INSULIN PUMP SYRINGE, 3 ML
EACH MISCELLANEOUS
Qty: 1 EACH | Refills: 0 | Status: SHIPPED | OUTPATIENT
Start: 2022-10-21 | End: 2023-10-23

## 2022-10-21 NOTE — TELEPHONE ENCOUNTER
----- Message from [facility] sent at 10/21/2022 10:58 AM CDT -----  Type:  Needs Medical Advice    Who Called: Pt   Would the patient rather a call back or a response via [facility]? Callback   Best Call Back Number: [phone]  Additional Information: pt requesting callback from office to discuss getting replacement glucometer. Pt states he's been sending messages to office but hasn't gotten a response.

## 2022-10-23 ENCOUNTER — CLINICAL SUPPORT (OUTPATIENT)
Dept: CARDIOLOGY | Facility: HOSPITAL | Age: 67
End: 2022-10-23
Payer: MEDICARE

## 2022-10-23 DIAGNOSIS — Z95.818 PRESENCE OF OTHER CARDIAC IMPLANTS AND GRAFTS: ICD-10-CM

## 2022-10-25 NOTE — TELEPHONE ENCOUNTER
"Patient does not have readings with him currently, but reports "I think they're a little high but I'm still waiting on approval of my meter, but they've been around 130's"   "

## 2022-10-27 ENCOUNTER — PATIENT MESSAGE (OUTPATIENT)
Dept: FAMILY MEDICINE | Facility: CLINIC | Age: 67
End: 2022-10-27
Payer: MEDICARE

## 2022-10-29 ENCOUNTER — DOCUMENTATION ONLY (OUTPATIENT)
Dept: REHABILITATION | Facility: HOSPITAL | Age: 67
End: 2022-10-29
Payer: MEDICARE

## 2022-10-29 NOTE — PROGRESS NOTES
Outpatient Therapy Discharge Summary     Name: Trevor Johnston  Clinic Number: [id number]    Therapy Diagnosis:    Chronic pain of left knee Yes    Weakness of both lower extremities      Decreased activity tolerance      Physician: Daya Laird FNP     Physician Orders: PT Eval and Treat   Medical Diagnosis from Referral: M17.12 (ICD-10-CM) - Primary osteoarthritis of left knee   Evaluation Date: 8/22/2022      Date of Last visit: 9/15/2022    Total Visits Received: 5  Cancelled Visits: 0  No Show Visits: 0    Assessment    Goals:   Short-Term Goals: 4 weeks  - The patient will be independent with initial home exercise program.  - The patient will increase strength to at least 5/5 to perform functional mobility.   - The patient will increase knee ROM grossly to 0-140 to perform ambulation with pain < 2/10.     Long-Term Goals: 8 weeks  - Pt to achieve <28% limitation as measured by the FOTO to demonstrate decreased disability.  - The patient will be independent with home exercise program and symptom management.  - The patient will return to yard work 3x per week without increase in pain    Discharge reason: Patient has not attended therapy since 9/15/2022      Plan   This patient is discharged from Physical Therapy      Hkaan Worthington, PT, DPT  10/29/2022

## 2022-11-03 ENCOUNTER — OFFICE VISIT (OUTPATIENT)
Dept: SURGERY | Facility: CLINIC | Age: 67
End: 2022-11-03
Payer: MEDICARE

## 2022-11-03 VITALS
SYSTOLIC BLOOD PRESSURE: 120 MMHG | HEART RATE: 65 BPM | BODY MASS INDEX: 26.77 KG/M2 | HEIGHT: 69 IN | TEMPERATURE: 98 F | WEIGHT: 180.75 LBS | DIASTOLIC BLOOD PRESSURE: 71 MMHG

## 2022-11-03 DIAGNOSIS — K64.9 BLEEDING HEMORRHOIDS: Primary | ICD-10-CM

## 2022-11-03 PROCEDURE — 99999 PR PBB SHADOW E&M-EST. PATIENT-LVL IV: ICD-10-PCS | Mod: PBBFAC,,, | Performed by: SURGERY

## 2022-11-03 PROCEDURE — 46221 PR HEMORRHOIDECTOMY INTERNAL RUBBER BAND LIGATIONS: ICD-10-PCS | Mod: S$PBB,,, | Performed by: SURGERY

## 2022-11-03 PROCEDURE — 99999 PR PBB SHADOW E&M-EST. PATIENT-LVL IV: CPT | Mod: PBBFAC,,, | Performed by: SURGERY

## 2022-11-03 PROCEDURE — 99499 UNLISTED E&M SERVICE: CPT | Mod: S$PBB,,, | Performed by: SURGERY

## 2022-11-03 PROCEDURE — 46221 LIGATION OF HEMORRHOID(S): CPT | Mod: PBBFAC,PO | Performed by: SURGERY

## 2022-11-03 PROCEDURE — 99214 OFFICE O/P EST MOD 30 MIN: CPT | Mod: PBBFAC,PO | Performed by: SURGERY

## 2022-11-03 PROCEDURE — 99499 NO LOS: ICD-10-PCS | Mod: S$PBB,,, | Performed by: SURGERY

## 2022-11-03 PROCEDURE — 46221 LIGATION OF HEMORRHOID(S): CPT | Mod: S$PBB,,, | Performed by: SURGERY

## 2022-11-03 RX ORDER — LANCETS 30 GAUGE
EACH MISCELLANEOUS
COMMUNITY
Start: 2022-10-28

## 2022-11-03 NOTE — PROGRESS NOTES
Pleasant 67-year-old gentleman presents today for banding.  Notes he has been having occasional bleeding from his hemorrhoids.      AFVSS  REctalL: large int hemorrhoids noted on anoscopy particularly in L lateral and R post position      Assessment: Bleeding hemorrhoids     Discussion with patient.  I have recommended proceeding with banding.  He desires to proceed.    Having received informed consent pt was placed in prone jackknife position. I then placed rubber bands on the L lateral column, R Ant and R post columns without event. Bleeding was minimal and pt tolerated the procedure well.

## 2022-11-04 RX ORDER — GLIMEPIRIDE 2 MG/1
2 TABLET ORAL
Qty: 90 TABLET | Refills: 0 | Status: SHIPPED | OUTPATIENT
Start: 2022-11-04 | End: 2023-04-14 | Stop reason: SDUPTHER

## 2022-11-05 ENCOUNTER — PATIENT MESSAGE (OUTPATIENT)
Dept: FAMILY MEDICINE | Facility: CLINIC | Age: 67
End: 2022-11-05
Payer: MEDICARE

## 2022-11-21 ENCOUNTER — TELEPHONE (OUTPATIENT)
Dept: CARDIOLOGY | Facility: HOSPITAL | Age: 67
End: 2022-11-21
Payer: MEDICARE

## 2022-11-21 NOTE — TELEPHONE ENCOUNTER
Pt had appt w/Dr. Isabel 9/7/22.  Pt's home monitor is being followed at [facility] pac[facility] clinic. Request to be transferred to [facility] in [facility]

## 2022-11-22 ENCOUNTER — CLINICAL SUPPORT (OUTPATIENT)
Dept: CARDIOLOGY | Facility: HOSPITAL | Age: 67
End: 2022-11-22
Payer: MEDICARE

## 2022-11-22 DIAGNOSIS — Z95.818 PRESENCE OF OTHER CARDIAC IMPLANTS AND GRAFTS: ICD-10-CM

## 2022-11-22 PROCEDURE — 93298 REM INTERROG DEV EVAL SCRMS: CPT | Mod: ,,, | Performed by: INTERNAL MEDICINE

## 2022-11-22 PROCEDURE — 93298 CARDIAC DEVICE CHECK - REMOTE: ICD-10-PCS | Mod: ,,, | Performed by: INTERNAL MEDICINE

## 2022-11-29 ENCOUNTER — TELEPHONE (OUTPATIENT)
Dept: CARDIOLOGY | Facility: HOSPITAL | Age: 67
End: 2022-11-29
Payer: MEDICARE

## 2022-11-29 NOTE — TELEPHONE ENCOUNTER
Released pt from [facility]

## 2022-12-05 ENCOUNTER — PATIENT MESSAGE (OUTPATIENT)
Dept: FAMILY MEDICINE | Facility: CLINIC | Age: 67
End: 2022-12-05
Payer: MEDICARE

## 2022-12-08 ENCOUNTER — PATIENT MESSAGE (OUTPATIENT)
Dept: FAMILY MEDICINE | Facility: CLINIC | Age: 67
End: 2022-12-08
Payer: MEDICARE

## 2022-12-08 NOTE — TELEPHONE ENCOUNTER
Patient advised that he will send in a log but that his numbers have come down a lot and seem to be running between 105-115.

## 2022-12-15 ENCOUNTER — OFFICE VISIT (OUTPATIENT)
Dept: SURGERY | Facility: CLINIC | Age: 67
End: 2022-12-15
Payer: MEDICARE

## 2022-12-15 VITALS
SYSTOLIC BLOOD PRESSURE: 126 MMHG | DIASTOLIC BLOOD PRESSURE: 75 MMHG | WEIGHT: 178.13 LBS | HEART RATE: 75 BPM | BODY MASS INDEX: 26.38 KG/M2 | HEIGHT: 69 IN | TEMPERATURE: 99 F

## 2022-12-15 DIAGNOSIS — Z09 POSTOP CHECK: Primary | ICD-10-CM

## 2022-12-15 PROCEDURE — 99999 PR PBB SHADOW E&M-EST. PATIENT-LVL IV: ICD-10-PCS | Mod: PBBFAC,,, | Performed by: SURGERY

## 2022-12-15 PROCEDURE — 99024 POSTOP FOLLOW-UP VISIT: CPT | Mod: POP,,, | Performed by: SURGERY

## 2022-12-15 PROCEDURE — 99999 PR PBB SHADOW E&M-EST. PATIENT-LVL IV: CPT | Mod: PBBFAC,,, | Performed by: SURGERY

## 2022-12-15 PROCEDURE — 99214 OFFICE O/P EST MOD 30 MIN: CPT | Mod: PBBFAC,PO | Performed by: SURGERY

## 2022-12-15 PROCEDURE — 99024 PR POST-OP FOLLOW-UP VISIT: ICD-10-PCS | Mod: POP,,, | Performed by: SURGERY

## 2022-12-15 NOTE — PROGRESS NOTES
Pt returns after banding.  He notes he is no longer having any bleeding.  No pain or discomfort.      AFVSS  AAOx3  CTA B  Sfot/nt/nd  Rectal deferred per pt request    A/P History of hemorrhoid bleeding.  S/p banding    Doing well.  NO further bleeding  Continue fiber.

## 2022-12-19 ENCOUNTER — PATIENT OUTREACH (OUTPATIENT)
Dept: ADMINISTRATIVE | Facility: HOSPITAL | Age: 67
End: 2022-12-19
Payer: MEDICARE

## 2022-12-19 NOTE — PROGRESS NOTES
UNCONTROLLED A1C REPORT.  PATIENT HAS AN UPCOMING LAB APPOINTMENT.  CHART REVIEWED AND LABS LINKED IF NEEDED.    Hemoglobin A1C   Date Value Ref Range Status   09/23/2022 8.2 (H) 4.0 - 5.6 % Final     Comment:     ADA Screening Guidelines:  5.7-6.4%  Consistent with prediabetes  >or=6.5%  Consistent with diabetes    High levels of fetal hemoglobin interfere with the HbA1C  assay. Heterozygous hemoglobin variants (HbS, HgC, etc)do  not significantly interfere with this assay.   However, presence of multiple variants may affect accuracy.     06/27/2022 5.6 4.0 - 5.6 % Final     Comment:     ADA Screening Guidelines:  5.7-6.4%  Consistent with prediabetes  >or=6.5%  Consistent with diabetes    High levels of fetal hemoglobin interfere with the HbA1C  assay. Heterozygous hemoglobin variants (HbS, HgC, etc)do  not significantly interfere with this assay.   However, presence of multiple variants may affect accuracy.     03/21/2022 6.0 (H) 4.0 - 5.6 % Final     Comment:     ADA Screening Guidelines:  5.7-6.4%  Consistent with prediabetes  >or=6.5%  Consistent with diabetes    High levels of fetal hemoglobin interfere with the HbA1C  assay. Heterozygous hemoglobin variants (HbS, HgC, etc)do  not significantly interfere with this assay.   However, presence of multiple variants may affect accuracy.

## 2022-12-27 ENCOUNTER — LAB VISIT (OUTPATIENT)
Dept: LAB | Facility: HOSPITAL | Age: 67
End: 2022-12-27
Payer: MEDICARE

## 2022-12-27 DIAGNOSIS — E11.69 TYPE 2 DIABETES MELLITUS WITH OTHER SPECIFIED COMPLICATION, WITHOUT LONG-TERM CURRENT USE OF INSULIN: ICD-10-CM

## 2022-12-27 LAB
ESTIMATED AVG GLUCOSE: 146 MG/DL (ref 68–131)
HBA1C MFR BLD: 6.7 % (ref 4–5.6)

## 2022-12-27 PROCEDURE — 36415 COLL VENOUS BLD VENIPUNCTURE: CPT | Mod: PO | Performed by: FAMILY MEDICINE

## 2022-12-27 PROCEDURE — 83036 HEMOGLOBIN GLYCOSYLATED A1C: CPT | Performed by: FAMILY MEDICINE

## 2022-12-28 ENCOUNTER — PATIENT MESSAGE (OUTPATIENT)
Dept: FAMILY MEDICINE | Facility: CLINIC | Age: 67
End: 2022-12-28
Payer: MEDICARE

## 2022-12-28 DIAGNOSIS — E11.8 DIABETES MELLITUS TYPE 2 WITH COMPLICATIONS: Primary | ICD-10-CM

## 2022-12-28 DIAGNOSIS — Z12.5 ENCOUNTER FOR SCREENING FOR MALIGNANT NEOPLASM OF PROSTATE: ICD-10-CM

## 2022-12-28 DIAGNOSIS — E11.69 HYPERLIPIDEMIA ASSOCIATED WITH TYPE 2 DIABETES MELLITUS: ICD-10-CM

## 2022-12-28 DIAGNOSIS — E78.5 HYPERLIPIDEMIA ASSOCIATED WITH TYPE 2 DIABETES MELLITUS: ICD-10-CM

## 2023-01-03 LAB
LEFT EYE DM RETINOPATHY: NEGATIVE
RIGHT EYE DM RETINOPATHY: NEGATIVE

## 2023-01-12 ENCOUNTER — PATIENT OUTREACH (OUTPATIENT)
Dept: ADMINISTRATIVE | Facility: HOSPITAL | Age: 68
End: 2023-01-12
Payer: MEDICARE

## 2023-01-18 ENCOUNTER — TELEPHONE (OUTPATIENT)
Dept: FAMILY MEDICINE | Facility: CLINIC | Age: 68
End: 2023-01-18
Payer: MEDICARE

## 2023-01-18 NOTE — TELEPHONE ENCOUNTER
----- Message from Diane Burgess sent at 1/17/2023  2:48 PM CST -----  harmacy Calling to Clarify an RX        Name of Caller  SLIM      Pharmacy Name   [id number] [address] [facility] [address]  [address]  [facility] [address]  Phone: [phone] Fax: [phone]              Prescription Name blood sugar diagnostic Strp        What do they need to clarify?  The CMN doem needs to be completed by the dr PRESCOTT FAXED TO THE NUMBER ON THE FILES PLEASE ADVISE      Best Call Back Number        Additional Information:

## 2023-02-27 ENCOUNTER — OFFICE VISIT (OUTPATIENT)
Dept: URGENT CARE | Facility: CLINIC | Age: 68
End: 2023-02-27
Payer: MEDICARE

## 2023-02-27 VITALS
SYSTOLIC BLOOD PRESSURE: 132 MMHG | TEMPERATURE: 99 F | BODY MASS INDEX: 29.99 KG/M2 | RESPIRATION RATE: 12 BRPM | DIASTOLIC BLOOD PRESSURE: 85 MMHG | HEIGHT: 65 IN | OXYGEN SATURATION: 94 % | WEIGHT: 180 LBS | HEART RATE: 59 BPM

## 2023-02-27 DIAGNOSIS — J06.9 VIRAL URI WITH COUGH: Primary | ICD-10-CM

## 2023-02-27 DIAGNOSIS — R05.1 ACUTE COUGH: ICD-10-CM

## 2023-02-27 LAB
CTP QC/QA: YES
SARS-COV-2 AG RESP QL IA.RAPID: NEGATIVE

## 2023-02-27 PROCEDURE — 99213 OFFICE O/P EST LOW 20 MIN: CPT | Mod: S$GLB,CS,, | Performed by: NURSE PRACTITIONER

## 2023-02-27 PROCEDURE — 87811 SARS-COV-2 COVID19 W/OPTIC: CPT | Mod: QW,CR,S$GLB, | Performed by: NURSE PRACTITIONER

## 2023-02-27 PROCEDURE — 87811 SARS CORONAVIRUS 2 ANTIGEN POCT, MANUAL READ: ICD-10-PCS | Mod: QW,CR,S$GLB, | Performed by: NURSE PRACTITIONER

## 2023-02-27 PROCEDURE — 99213 PR OFFICE/OUTPT VISIT, EST, LEVL III, 20-29 MIN: ICD-10-PCS | Mod: S$GLB,CS,, | Performed by: NURSE PRACTITIONER

## 2023-02-27 RX ORDER — BENZONATATE 200 MG/1
200 CAPSULE ORAL 3 TIMES DAILY PRN
Qty: 30 CAPSULE | Refills: 0 | Status: SHIPPED | OUTPATIENT
Start: 2023-02-27 | End: 2023-03-09

## 2023-02-27 RX ORDER — FLUTICASONE PROPIONATE 50 MCG
2 SPRAY, SUSPENSION (ML) NASAL DAILY
Qty: 15.8 ML | Refills: 2 | Status: SHIPPED | OUTPATIENT
Start: 2023-02-27 | End: 2023-10-23

## 2023-02-27 RX ORDER — BIMATOPROST 0.1 MG/ML
1 SOLUTION/ DROPS OPHTHALMIC NIGHTLY
COMMUNITY
Start: 2023-01-23

## 2023-02-27 NOTE — PROGRESS NOTES
"Subjective:       Patient ID: Trevor Johnston is a 67 y.o. male.    Vitals:  height is 5' 5" (1.651 m) and weight is 81.6 kg (180 lb). His oral temperature is 98.5 °F (36.9 °C). His blood pressure is 132/85 and his pulse is 59 (abnormal). His respiration is 12 and oxygen saturation is 94% (abnormal).     Chief Complaint: Cough    Pt notes that he has been visiting the hospital over the past several days for his mother who is staying there. Symptom onset 2 days ago, recent sick contact with his brother, unknown dx.     Cough  This is a new problem. The current episode started in the past 7 days (2 days ago). The problem has been gradually worsening. The problem occurs constantly. The cough is Productive of sputum. Associated symptoms include postnasal drip (pt states postnasal drip is typical for his baseline). Pertinent negatives include no chest pain, chills, ear congestion, ear pain, fever, headaches, heartburn, hemoptysis, myalgias, nasal congestion, rash, rhinorrhea, sore throat, shortness of breath, sweats, weight loss or wheezing. Nothing aggravates the symptoms. Treatments tried: delsym, cough drops. The treatment provided no relief. There is no history of asthma, bronchiectasis, bronchitis, COPD, emphysema, environmental allergies or pneumonia.     Constitution: Negative for chills, sweating, fatigue and fever.   HENT:  Positive for postnasal drip (pt states postnasal drip is typical for his baseline). Negative for ear pain, ear discharge, congestion, sinus pain, sinus pressure, sore throat, trouble swallowing and voice change.    Neck: Negative for neck pain and painful lymph nodes.   Cardiovascular:  Negative for chest pain, palpitations and sob on exertion.   Respiratory:  Positive for cough. Negative for chest tightness, sputum production, bloody sputum, shortness of breath and wheezing.    Gastrointestinal:  Negative for abdominal pain, nausea, vomiting, diarrhea and heartburn.   Musculoskeletal:  " Negative for muscle ache.   Skin:  Negative for color change, pale and rash.   Allergic/Immunologic: Negative for environmental allergies and sneezing.   Neurological:  Negative for headaches.   Hematologic/Lymphatic: Negative for swollen lymph nodes.     Objective:      Physical Exam   Constitutional: He is oriented to person, place, and time. He appears well-developed. He is cooperative.  Non-toxic appearance. He does not appear ill. No distress.   HENT:   Head: Normocephalic and atraumatic.   Ears:   Right Ear: Hearing, tympanic membrane, external ear and ear canal normal.   Left Ear: Hearing, tympanic membrane, external ear and ear canal normal.      Comments: Non suppurative effusion bilaterally, no retraction, bulging or injection    Nose: Nose normal. No mucosal edema, rhinorrhea, nasal deformity or congestion. No epistaxis. Right sinus exhibits no maxillary sinus tenderness and no frontal sinus tenderness. Left sinus exhibits no maxillary sinus tenderness and no frontal sinus tenderness.   Mouth/Throat: Uvula is midline and mucous membranes are normal. No trismus in the jaw. Normal dentition. No uvula swelling. Posterior oropharyngeal erythema present. No oropharyngeal exudate or posterior oropharyngeal edema.   Eyes: Conjunctivae and lids are normal. No scleral icterus.   Neck: Trachea normal and phonation normal. Neck supple. No edema present. No erythema present. No neck rigidity present.   Cardiovascular: Normal rate, regular rhythm and normal heart sounds.   Pulmonary/Chest: Effort normal and breath sounds normal. No stridor. No respiratory distress. He has no decreased breath sounds. He has no wheezes. He has no rhonchi. He has no rales. He exhibits no tenderness.   Abdominal: Normal appearance.   Musculoskeletal: Normal range of motion.         General: No deformity. Normal range of motion.      Cervical back: He exhibits no tenderness.   Lymphadenopathy:     He has no cervical adenopathy.    Neurological: He is alert and oriented to person, place, and time. He exhibits normal muscle tone. Coordination normal.   Skin: Skin is warm, dry, intact, not diaphoretic and not pale.   Psychiatric: His speech is normal and behavior is normal. Judgment and thought content normal.   Nursing note and vitals reviewed.      Results for orders placed or performed in visit on 02/27/23   SARS Coronavirus 2 Antigen, POCT Manual Read   Result Value Ref Range    SARS Coronavirus 2 Antigen Negative Negative     Acceptable Yes        Assessment:       1. Viral URI with cough    2. Acute cough          Plan:         Viral URI with cough  -     fluticasone propionate (FLONASE) 50 mcg/actuation nasal spray; 2 sprays (100 mcg total) by Each Nostril route once daily.  Dispense: 15.8 mL; Refill: 2  -     benzonatate (TESSALON) 200 MG capsule; Take 1 capsule (200 mg total) by mouth 3 (three) times daily as needed for Cough.  Dispense: 30 capsule; Refill: 0    Acute cough  -     SARS Coronavirus 2 Antigen, POCT Manual Read                   Patient Instructions   See additional patient Instructions provided    - Rest.    - Drink plenty of fluids.  - Bacterial infections can be treated with oral antibiotics, however, Viral upper respiratory infections will not respond to antibiotics but with simple symptomatic care, typically run their course in 10-14 days.     - Tylenol or Ibuprofen as directed as needed for fever/pain. Avoid tylenol if you have a history of liver disease. Do not take ibuprofen if you have a history of GI bleeding, kidney disease, or if you take blood thinners.     - You can take over-the-counter claritin, zyrtec, allegra, or xyzal as directed. These are antihistamines that can help with runny nose, nasal congestion, sneezing, and helps to dry up post-nasal drip, which usually causes sore throat and cough.   - If you do NOT have high blood pressure, you may use a decongestant form (D)  of this  medication (ie. Claritin- D, zyrtec-D, allegra-D) or if you do not take the D form, you can take sudafed (pseudoephedrine) over the counter, which is a decongestant. Do NOT take two decongestant (D) medications at the same time (such as mucinex-D and claritin-D or plain sudafed and claritin D)    - You can use Flonase (fluticasone) nasal spray as directed for sinus congestion and postnasal drip. This is a steroid nasal spray that works locally over time to decrease the inflammation in your nose/sinuses and help with allergic symptoms. This is not an quick- relief spray like afrin, but it works well if used daily.  Discontinue if you develop nose bleed  - use nasal saline prior to Flonase.  - Use Ocean Spray Nasal Saline 1-3 puffs each nostril every 2-3 hours then blow out onto tissue. This is to irrigate the nasal passage way to clear the sinus openings. Use until sinus problem resolved.    - you can take plain Mucinex (guaifenesin) 1200 mg twice a day to help loosen mucous.     -warm salt water gargles can help with sore throat    - warm tea with honey can help with cough. Honey is a natural cough suppressant.    - Dextromethorphan (DM) is a cough suppressant over the counter (ie. mucinex DM, robitussin, delsym; dayquil/nyquil has DM as well.)    - Follow up with your PCP or specialty clinic as directed in the next 1-2 weeks if not improved or as needed.  You can call [phone] to schedule an appointment with the appropriate provider.      - Go to the ER if you develop new or worsening symptoms.     - You must understand that you have received an Urgent Care treatment only and that you may be released before all of your medical problems are known or treated.   - You, the patient, will arrange for follow up care as instructed.   - If your condition worsens or fails to improve we recommend that you receive another evaluation at the ER immediately or contact your PCP to discuss your concerns or return here.      Patient Instructions   - You must understand that you have received an Urgent Care treatment only and that you may be released before all of your medical problems are known or treated.   - You, the patient, will arrange for follow up care as instructed.   - If your condition worsens or fails to improve we recommend that you receive another evaluation at the ER immediately or contact your PCP to discuss your concerns or return here.     Advised on return/follow-up precautions. Advised on ER precautions. Answered all patient questions. Patient verbalized understanding and voiced agreement with current treatment plan.

## 2023-02-27 NOTE — PATIENT INSTRUCTIONS
See additional patient Instructions provided    - Rest.    - Drink plenty of fluids.  - Bacterial infections can be treated with oral antibiotics, however, Viral upper respiratory infections will not respond to antibiotics but with simple symptomatic care, typically run their course in 10-14 days.     - Tylenol or Ibuprofen as directed as needed for fever/pain. Avoid tylenol if you have a history of liver disease. Do not take ibuprofen if you have a history of GI bleeding, kidney disease, or if you take blood thinners.     - You can take over-the-counter claritin, zyrtec, allegra, or xyzal as directed. These are antihistamines that can help with runny nose, nasal congestion, sneezing, and helps to dry up post-nasal drip, which usually causes sore throat and cough.   - If you do NOT have high blood pressure, you may use a decongestant form (D)  of this medication (ie. Claritin- D, zyrtec-D, allegra-D) or if you do not take the D form, you can take sudafed (pseudoephedrine) over the counter, which is a decongestant. Do NOT take two decongestant (D) medications at the same time (such as mucinex-D and claritin-D or plain sudafed and claritin D)    - You can use Flonase (fluticasone) nasal spray as directed for sinus congestion and postnasal drip. This is a steroid nasal spray that works locally over time to decrease the inflammation in your nose/sinuses and help with allergic symptoms. This is not an quick- relief spray like afrin, but it works well if used daily.  Discontinue if you develop nose bleed  - use nasal saline prior to Flonase.  - Use Ocean Spray Nasal Saline 1-3 puffs each nostril every 2-3 hours then blow out onto tissue. This is to irrigate the nasal passage way to clear the sinus openings. Use until sinus problem resolved.    - you can take plain Mucinex (guaifenesin) 1200 mg twice a day to help loosen mucous.     -warm salt water gargles can help with sore throat    - warm tea with honey can help with  cough. Honey is a natural cough suppressant.    - Dextromethorphan (DM) is a cough suppressant over the counter (ie. mucinex DM, robitussin, delsym; dayquil/nyquil has DM as well.)    - Follow up with your PCP or specialty clinic as directed in the next 1-2 weeks if not improved or as needed.  You can call [phone] to schedule an appointment with the appropriate provider.      - Go to the ER if you develop new or worsening symptoms.     - You must understand that you have received an Urgent Care treatment only and that you may be released before all of your medical problems are known or treated.   - You, the patient, will arrange for follow up care as instructed.   - If your condition worsens or fails to improve we recommend that you receive another evaluation at the ER immediately or contact your PCP to discuss your concerns or return here.     Patient Instructions   - You must understand that you have received an Urgent Care treatment only and that you may be released before all of your medical problems are known or treated.   - You, the patient, will arrange for follow up care as instructed.   - If your condition worsens or fails to improve we recommend that you receive another evaluation at the ER immediately or contact your PCP to discuss your concerns or return here.     Advised on return/follow-up precautions. Advised on ER precautions. Answered all patient questions. Patient verbalized understanding and voiced agreement with current treatment plan.

## 2023-03-04 DIAGNOSIS — E78.5 HYPERLIPIDEMIA, UNSPECIFIED HYPERLIPIDEMIA TYPE: ICD-10-CM

## 2023-03-04 NOTE — TELEPHONE ENCOUNTER
Care Due:                  Date            Visit Type   Department     Provider  --------------------------------------------------------------------------------                                EP -                              PRIMARY      [facility] FAMILY    Blanca Doe  Last Visit: 09-      CARE ([facility])   MEDICINE       Anger  Next Visit: None Scheduled  None         None Found                                                            Last  Test          Frequency    Reason                     Performed    Due Date  --------------------------------------------------------------------------------    Lipid Panel.  12 months..  atorvastatin.............  03- 03-    [facility] Embedded Care Gaps. Reference number: [id number]. 3/04/2023   10:27:29 AM CST

## 2023-03-05 RX ORDER — ATORVASTATIN CALCIUM 40 MG/1
40 TABLET, FILM COATED ORAL DAILY
Qty: 90 TABLET | Refills: 0 | Status: SHIPPED | OUTPATIENT
Start: 2023-03-05 | End: 2023-04-14 | Stop reason: SDUPTHER

## 2023-03-05 NOTE — TELEPHONE ENCOUNTER
Refill Routing Note   Medication(s) are not appropriate for processing by [facility] for the following reason(s):       Drug-disease interaction: atorvastatin and GORGE (acute kidney injury)    [facility] action(s):  Defer   Requires labs : Yes    Medication Therapy Plan: Labs (lipid panel) due 3/17/2023, ordered, mpt may require staff scheduling assistance    Appointments  past 12m or future 3m with PCP    Date Provider   Last Visit   9/29/2022 Blanca Doe MD   Next Visit   Visit date not found Blanca Doe MD   ED visits in past 90 days: 0        Note composed:3:01 PM 03/05/2023

## 2023-03-06 ENCOUNTER — TELEPHONE (OUTPATIENT)
Dept: FAMILY MEDICINE | Facility: CLINIC | Age: 68
End: 2023-03-06
Payer: MEDICARE

## 2023-03-06 NOTE — TELEPHONE ENCOUNTER
----- Message from Viridiana Crews sent at 3/6/2023  2:48 PM CST -----  Regarding: return call  Type:  Patient Returning Call    Who Called: MINAL HENRY [[id number]]    Who Left Message for Patient: Unknown     Does the patient know what this is regarding? Unknown    Best Call Back Number:      [phone]        Additional Information: you vcan text he said if he does not answer the call  or portal message

## 2023-03-06 NOTE — TELEPHONE ENCOUNTER
Dr. Doe 4/14/23 10:00,  will schedule    Labs scheduled    Future Appointments   Date Time Provider Department Center   3/13/2023 11:30 AM LAB, [place] [facility] [place]   3/16/2023 10:45 AM Rodrigo Isabel MD [facility] CARD [facility] Card

## 2023-03-13 ENCOUNTER — LAB VISIT (OUTPATIENT)
Dept: LAB | Facility: HOSPITAL | Age: 68
End: 2023-03-13
Attending: FAMILY MEDICINE
Payer: MEDICARE

## 2023-03-13 DIAGNOSIS — E78.5 HYPERLIPIDEMIA ASSOCIATED WITH TYPE 2 DIABETES MELLITUS: ICD-10-CM

## 2023-03-13 DIAGNOSIS — Z12.5 ENCOUNTER FOR SCREENING FOR MALIGNANT NEOPLASM OF PROSTATE: ICD-10-CM

## 2023-03-13 DIAGNOSIS — E11.69 HYPERLIPIDEMIA ASSOCIATED WITH TYPE 2 DIABETES MELLITUS: ICD-10-CM

## 2023-03-13 DIAGNOSIS — E11.8 DIABETES MELLITUS TYPE 2 WITH COMPLICATIONS: ICD-10-CM

## 2023-03-13 DIAGNOSIS — E11.69 TYPE 2 DIABETES MELLITUS WITH OTHER SPECIFIED COMPLICATION, WITHOUT LONG-TERM CURRENT USE OF INSULIN: ICD-10-CM

## 2023-03-13 LAB
ALBUMIN SERPL BCP-MCNC: 4 G/DL (ref 3.5–5.2)
ALBUMIN/CREAT UR: 6.8 UG/MG (ref 0–30)
ALP SERPL-CCNC: 90 U/L (ref 55–135)
ALT SERPL W/O P-5'-P-CCNC: 43 U/L (ref 10–44)
ANION GAP SERPL CALC-SCNC: 8 MMOL/L (ref 8–16)
AST SERPL-CCNC: 28 U/L (ref 10–40)
BASOPHILS # BLD AUTO: 0.06 K/UL (ref 0–0.2)
BASOPHILS NFR BLD: 0.6 % (ref 0–1.9)
BILIRUB SERPL-MCNC: 0.6 MG/DL (ref 0.1–1)
BUN SERPL-MCNC: 14 MG/DL (ref 8–23)
CALCIUM SERPL-MCNC: 10.4 MG/DL (ref 8.7–10.5)
CHLORIDE SERPL-SCNC: 105 MMOL/L (ref 95–110)
CHOLEST SERPL-MCNC: 178 MG/DL (ref 120–199)
CHOLEST/HDLC SERPL: 4.7 {RATIO} (ref 2–5)
CO2 SERPL-SCNC: 28 MMOL/L (ref 23–29)
COMPLEXED PSA SERPL-MCNC: 7.1 NG/ML (ref 0–4)
CREAT SERPL-MCNC: 0.8 MG/DL (ref 0.5–1.4)
CREAT UR-MCNC: 207 MG/DL (ref 23–375)
DIFFERENTIAL METHOD: ABNORMAL
EOSINOPHIL # BLD AUTO: 0.3 K/UL (ref 0–0.5)
EOSINOPHIL NFR BLD: 2.8 % (ref 0–8)
ERYTHROCYTE [DISTWIDTH] IN BLOOD BY AUTOMATED COUNT: 12.9 % (ref 11.5–14.5)
EST. GFR  (NO RACE VARIABLE): >60 ML/MIN/1.73 M^2
ESTIMATED AVG GLUCOSE: 146 MG/DL (ref 68–131)
GLUCOSE SERPL-MCNC: 81 MG/DL (ref 70–110)
HBA1C MFR BLD: 6.7 % (ref 4–5.6)
HCT VFR BLD AUTO: 43 % (ref 40–54)
HDLC SERPL-MCNC: 38 MG/DL (ref 40–75)
HDLC SERPL: 21.3 % (ref 20–50)
HGB BLD-MCNC: 13.7 G/DL (ref 14–18)
IMM GRANULOCYTES # BLD AUTO: 0.03 K/UL (ref 0–0.04)
IMM GRANULOCYTES NFR BLD AUTO: 0.3 % (ref 0–0.5)
LDLC SERPL CALC-MCNC: 78.2 MG/DL (ref 63–159)
LYMPHOCYTES # BLD AUTO: 2.8 K/UL (ref 1–4.8)
LYMPHOCYTES NFR BLD: 27.4 % (ref 18–48)
MCH RBC QN AUTO: 30 PG (ref 27–31)
MCHC RBC AUTO-ENTMCNC: 31.9 G/DL (ref 32–36)
MCV RBC AUTO: 94 FL (ref 82–98)
MICROALBUMIN UR DL<=1MG/L-MCNC: 14 UG/ML
MONOCYTES # BLD AUTO: 1 K/UL (ref 0.3–1)
MONOCYTES NFR BLD: 10 % (ref 4–15)
NEUTROPHILS # BLD AUTO: 6 K/UL (ref 1.8–7.7)
NEUTROPHILS NFR BLD: 58.9 % (ref 38–73)
NONHDLC SERPL-MCNC: 140 MG/DL
NRBC BLD-RTO: 0 /100 WBC
PLATELET # BLD AUTO: 285 K/UL (ref 150–450)
PMV BLD AUTO: 9.5 FL (ref 9.2–12.9)
POTASSIUM SERPL-SCNC: 4 MMOL/L (ref 3.5–5.1)
PROT SERPL-MCNC: 7.8 G/DL (ref 6–8.4)
RBC # BLD AUTO: 4.56 M/UL (ref 4.6–6.2)
SODIUM SERPL-SCNC: 141 MMOL/L (ref 136–145)
TRIGL SERPL-MCNC: 309 MG/DL (ref 30–150)
URATE SERPL-MCNC: 4.6 MG/DL (ref 3.4–7)
WBC # BLD AUTO: 10.11 K/UL (ref 3.9–12.7)

## 2023-03-13 PROCEDURE — 84153 ASSAY OF PSA TOTAL: CPT | Performed by: FAMILY MEDICINE

## 2023-03-13 PROCEDURE — 80061 LIPID PANEL: CPT | Performed by: FAMILY MEDICINE

## 2023-03-13 PROCEDURE — 82570 ASSAY OF URINE CREATININE: CPT | Performed by: FAMILY MEDICINE

## 2023-03-13 PROCEDURE — 36415 COLL VENOUS BLD VENIPUNCTURE: CPT | Mod: PO | Performed by: FAMILY MEDICINE

## 2023-03-13 PROCEDURE — 84550 ASSAY OF BLOOD/URIC ACID: CPT | Performed by: FAMILY MEDICINE

## 2023-03-13 PROCEDURE — 83036 HEMOGLOBIN GLYCOSYLATED A1C: CPT | Performed by: FAMILY MEDICINE

## 2023-03-13 PROCEDURE — 80053 COMPREHEN METABOLIC PANEL: CPT | Performed by: FAMILY MEDICINE

## 2023-03-13 PROCEDURE — 85025 COMPLETE CBC W/AUTO DIFF WBC: CPT | Performed by: FAMILY MEDICINE

## 2023-03-13 RX ORDER — METFORMIN HYDROCHLORIDE 500 MG/1
TABLET, EXTENDED RELEASE ORAL
Qty: 270 TABLET | Refills: 0 | Status: SHIPPED | OUTPATIENT
Start: 2023-03-13 | End: 2023-04-14 | Stop reason: SDUPTHER

## 2023-03-13 NOTE — TELEPHONE ENCOUNTER
No new care gaps identified.  [facility] Embedded Care Gaps. Reference number: [id number]. 3/13/2023   8:45:58 AM [place]

## 2023-03-28 DIAGNOSIS — N20.0 KIDNEY STONES: ICD-10-CM

## 2023-03-28 RX ORDER — ALLOPURINOL 300 MG/1
TABLET ORAL
Qty: 90 TABLET | Refills: 1 | Status: SHIPPED | OUTPATIENT
Start: 2023-03-28 | End: 2023-04-14 | Stop reason: SDUPTHER

## 2023-03-28 NOTE — TELEPHONE ENCOUNTER
No new care gaps identified.  [facility] Embedded Care Gaps. Reference number: [id number]. 3/28/2023   2:18:58 AM [place]

## 2023-03-28 NOTE — TELEPHONE ENCOUNTER
Refill Decision Note   Trevor Vickie  is requesting a refill authorization.  Brief Assessment and Rationale for Refill:  Approve     Medication Therapy Plan:       Medication Reconciliation Completed: No   Comments:     No Care Gaps recommended.     Note composed:8:29 AM 03/28/2023

## 2023-04-05 ENCOUNTER — OFFICE VISIT (OUTPATIENT)
Dept: URGENT CARE | Facility: CLINIC | Age: 68
End: 2023-04-05
Payer: MEDICARE

## 2023-04-05 VITALS
WEIGHT: 182 LBS | BODY MASS INDEX: 30.32 KG/M2 | DIASTOLIC BLOOD PRESSURE: 73 MMHG | TEMPERATURE: 98 F | HEIGHT: 65 IN | OXYGEN SATURATION: 98 % | RESPIRATION RATE: 18 BRPM | SYSTOLIC BLOOD PRESSURE: 113 MMHG | HEART RATE: 66 BPM

## 2023-04-05 DIAGNOSIS — R05.1 ACUTE COUGH: Primary | ICD-10-CM

## 2023-04-05 LAB
CTP QC/QA: YES
CTP QC/QA: YES
POC MOLECULAR INFLUENZA A AGN: NEGATIVE
POC MOLECULAR INFLUENZA B AGN: NEGATIVE
SARS-COV-2 AG RESP QL IA.RAPID: NEGATIVE

## 2023-04-05 PROCEDURE — 99214 PR OFFICE/OUTPT VISIT, EST, LEVL IV, 30-39 MIN: ICD-10-PCS | Mod: S$GLB,,, | Performed by: PHYSICIAN ASSISTANT

## 2023-04-05 PROCEDURE — 99214 OFFICE O/P EST MOD 30 MIN: CPT | Mod: S$GLB,,, | Performed by: PHYSICIAN ASSISTANT

## 2023-04-05 PROCEDURE — 87502 POCT INFLUENZA A/B MOLECULAR: ICD-10-PCS | Mod: QW,S$GLB,, | Performed by: PHYSICIAN ASSISTANT

## 2023-04-05 PROCEDURE — 87811 SARS CORONAVIRUS 2 ANTIGEN POCT, MANUAL READ: ICD-10-PCS | Mod: QW,CR,S$GLB, | Performed by: PHYSICIAN ASSISTANT

## 2023-04-05 PROCEDURE — 87811 SARS-COV-2 COVID19 W/OPTIC: CPT | Mod: QW,CR,S$GLB, | Performed by: PHYSICIAN ASSISTANT

## 2023-04-05 PROCEDURE — 87502 INFLUENZA DNA AMP PROBE: CPT | Mod: QW,S$GLB,, | Performed by: PHYSICIAN ASSISTANT

## 2023-04-05 RX ORDER — BENZONATATE 200 MG/1
200 CAPSULE ORAL 3 TIMES DAILY PRN
Qty: 30 CAPSULE | Refills: 0 | Status: SHIPPED | OUTPATIENT
Start: 2023-04-05 | End: 2023-04-14

## 2023-04-05 RX ORDER — PROMETHAZINE HYDROCHLORIDE AND DEXTROMETHORPHAN HYDROBROMIDE 6.25; 15 MG/5ML; MG/5ML
5 SYRUP ORAL NIGHTLY PRN
Qty: 118 ML | Refills: 0 | Status: SHIPPED | OUTPATIENT
Start: 2023-04-05 | End: 2023-04-14

## 2023-04-05 NOTE — PROGRESS NOTES
"Subjective:      Patient ID: Trevor Johnston is a 67 y.o. male.    Vitals:  height is 5' 5" (1.651 m) and weight is 82.6 kg (182 lb). His oral temperature is 98.4 °F (36.9 °C). His blood pressure is 113/73 and his pulse is 66. His respiration is 18 and oxygen saturation is 98%.     Chief Complaint: Cough    Pt presents with cough x 2 days. Pt states cough is unchanged. Cough is dry, but sometimes productive of clear sputum. Feels ok otherwise, no other new associated symptoms. No known new allergens or exposures. No new medications. No cp, dyspnea, nasal congestion, sore throat, or fever. Pt states he has chronic post nasal drip with no recent change. Pt states he has not had covid/flu exposure has not done any at home testing.    Cough  This is a new problem. The current episode started in the past 7 days. The problem has been gradually worsening. The problem occurs every few minutes. The cough is Productive of sputum. Associated symptoms include postnasal drip. Pertinent negatives include no chest pain, chills, ear pain, eye redness, fever, headaches, hemoptysis, rash, sore throat, shortness of breath or wheezing. Nothing aggravates the symptoms. He has tried nothing for the symptoms. The treatment provided no relief.     Constitution: Negative for chills, sweating, fatigue and fever.   HENT:  Positive for postnasal drip. Negative for ear pain, congestion and sore throat.    Neck: Negative for neck pain and neck stiffness.   Cardiovascular:  Negative for chest pain, leg swelling, palpitations and sob on exertion.   Eyes:  Negative for eye itching, eye pain and eye redness.   Respiratory:  Positive for cough and sputum production. Negative for sleep apnea, chest tightness, bloody sputum, shortness of breath, stridor, wheezing and asthma.    Gastrointestinal:  Negative for abdominal pain, nausea, vomiting and diarrhea.   Genitourinary:  Negative for dysuria, frequency, urgency, flank pain and hematuria. "   Musculoskeletal:  Negative for pain, joint pain and abnormal ROM of joint.   Skin:  Negative for color change and rash.   Allergic/Immunologic: Negative for asthma.   Neurological:  Negative for dizziness, passing out, headaches, disorientation and numbness.   Psychiatric/Behavioral:  Negative for disorientation.     Objective:     Physical Exam   Constitutional: He is oriented to person, place, and time. He appears well-developed. He is cooperative.  Non-toxic appearance. He does not appear ill. No distress.   HENT:   Head: Normocephalic and atraumatic.   Ears:   Right Ear: Hearing, tympanic membrane, external ear and ear canal normal.   Left Ear: Hearing, tympanic membrane, external ear and ear canal normal.   Nose: Nose normal. No mucosal edema, rhinorrhea or nasal deformity. No epistaxis. Right sinus exhibits no maxillary sinus tenderness and no frontal sinus tenderness. Left sinus exhibits no maxillary sinus tenderness and no frontal sinus tenderness.   Mouth/Throat: Uvula is midline, oropharynx is clear and moist and mucous membranes are normal. He does not have dentures. No trismus in the jaw. Normal dentition. No uvula swelling or dental caries. No oropharyngeal exudate, posterior oropharyngeal edema, posterior oropharyngeal erythema, tonsillar abscesses or cobblestoning. Tonsils are 1+ on the right. Tonsils are 1+ on the left. No tonsillar exudate.   Eyes: Conjunctivae and lids are normal. No scleral icterus.   Neck: Trachea normal and phonation normal. Neck supple. No edema present. No erythema present. No neck rigidity present.   Cardiovascular: Normal rate, regular rhythm, normal heart sounds and normal pulses.   Pulmonary/Chest: Effort normal and breath sounds normal. No accessory muscle usage or stridor. No tachypnea and no bradypnea. No respiratory distress. He has no decreased breath sounds. He has no wheezes. He has no rhonchi. He has no rales.   Abdominal: Normal appearance.   Musculoskeletal:  Normal range of motion.         General: No deformity. Normal range of motion.   Lymphadenopathy:     He has no cervical adenopathy.   Neurological: He is alert and oriented to person, place, and time. He exhibits normal muscle tone. Coordination normal.   Skin: Skin is warm, dry, intact, not diaphoretic and not pale.   Psychiatric: His speech is normal and behavior is normal. Judgment and thought content normal.   Nursing note and vitals reviewed.      Results for orders placed or performed in visit on 04/05/23   SARS Coronavirus 2 Antigen, POCT Manual Read   Result Value Ref Range    SARS Coronavirus 2 Antigen Negative Negative     Acceptable Yes    POCT Influenza A/B MOLECULAR   Result Value Ref Range    POC Molecular Influenza A Ag Negative Negative, Not Reported    POC Molecular Influenza B Ag Negative Negative, Not Reported     Acceptable Yes        Assessment:     1. Acute cough        Plan:     - Discussed ddx, home care, tx options, and given follow up precautions.  I have reviewed the patient's chart to view previous visits, labs, and imaging to assess PMH and look for any trends or previous treatments.  Pt with cough, no other symptoms. Unclear etiology though likely allergic vs viral. Exam benign, vitals reassuring. Symptomatic tx, monitor symptoms, seek medical attn for new or worsening symptoms.     Acute cough  -     SARS Coronavirus 2 Antigen, POCT Manual Read  -     POCT Influenza A/B MOLECULAR  -     benzonatate (TESSALON) 200 MG capsule; Take 1 capsule (200 mg total) by mouth 3 (three) times daily as needed for Cough.  Dispense: 30 capsule; Refill: 0  -     promethazine-dextromethorphan (PROMETHAZINE-DM) 6.25-15 mg/5 mL Syrp; Take 5 mLs by mouth nightly as needed (cough).  Dispense: 118 mL; Refill: 0      Patient Instructions   - Rest.    - Drink plenty of fluids.  - Viral upper respiratory infections typically run their course in 10-14 days.     - Tylenol  (acetaminophen) or Ibuprofen as directed as needed for fever/pain. Avoid tylenol if you have a history of liver disease. Do not take ibuprofen if you have a history of GI bleeding, kidney disease, gastric surgery, or if you take blood thinners.     - You can take over-the-counter claritin, zyrtec, allegra, or xyzal as directed. These are antihistamines that can help with runny nose, nasal congestion, sneezing, and helps to dry up post-nasal drip, which usually causes sore throat and cough.    - you can take plain Mucinex (guaifenesin) 1200 mg twice a day to help loosen mucous.     - warm tea with honey can help with cough. Honey is a natural cough suppressant.    - Take the tessalon (benzonatate) as needed as prescribed for cough   - Only take the promethazine- DM as directed, as needed at night for cough. This medication may cause drowsiness.      - Follow up with your PCP or specialty clinic as directed in the next 1-2 weeks if not improved or as needed.  You can call [phone] to schedule an appointment with the appropriate provider.      - Go to the ER if you develop new or worsening symptoms.     - You must understand that you have received an Urgent Care treatment only and that you may be released before all of your medical problems are known or treated.   - You, the patient, will arrange for follow up care as instructed.   - If your condition worsens or fails to improve we recommend that you receive another evaluation at the ER immediately or contact your PCP to discuss your concerns or return here.

## 2023-04-05 NOTE — PATIENT INSTRUCTIONS
- Rest.    - Drink plenty of fluids.  - Viral upper respiratory infections typically run their course in 10-14 days.     - Tylenol (acetaminophen) or Ibuprofen as directed as needed for fever/pain. Avoid tylenol if you have a history of liver disease. Do not take ibuprofen if you have a history of GI bleeding, kidney disease, gastric surgery, or if you take blood thinners.     - You can take over-the-counter claritin, zyrtec, allegra, or xyzal as directed. These are antihistamines that can help with runny nose, nasal congestion, sneezing, and helps to dry up post-nasal drip, which usually causes sore throat and cough.    - you can take plain Mucinex (guaifenesin) 1200 mg twice a day to help loosen mucous.     - warm tea with honey can help with cough. Honey is a natural cough suppressant.    - Take the tessalon (benzonatate) as needed as prescribed for cough   - Only take the promethazine- DM as directed, as needed at night for cough. This medication may cause drowsiness.      - Follow up with your PCP or specialty clinic as directed in the next 1-2 weeks if not improved or as needed.  You can call [phone] to schedule an appointment with the appropriate provider.      - Go to the ER if you develop new or worsening symptoms.     - You must understand that you have received an Urgent Care treatment only and that you may be released before all of your medical problems are known or treated.   - You, the patient, will arrange for follow up care as instructed.   - If your condition worsens or fails to improve we recommend that you receive another evaluation at the ER immediately or contact your PCP to discuss your concerns or return here.

## 2023-04-14 ENCOUNTER — OFFICE VISIT (OUTPATIENT)
Dept: FAMILY MEDICINE | Facility: CLINIC | Age: 68
End: 2023-04-14
Payer: MEDICARE

## 2023-04-14 VITALS
HEIGHT: 65 IN | OXYGEN SATURATION: 97 % | WEIGHT: 180.75 LBS | HEART RATE: 67 BPM | SYSTOLIC BLOOD PRESSURE: 104 MMHG | DIASTOLIC BLOOD PRESSURE: 62 MMHG | TEMPERATURE: 99 F | BODY MASS INDEX: 30.12 KG/M2

## 2023-04-14 DIAGNOSIS — R97.20 ELEVATED PSA: ICD-10-CM

## 2023-04-14 DIAGNOSIS — N20.0 KIDNEY STONES: ICD-10-CM

## 2023-04-14 DIAGNOSIS — E78.5 HYPERLIPIDEMIA, UNSPECIFIED HYPERLIPIDEMIA TYPE: ICD-10-CM

## 2023-04-14 DIAGNOSIS — I48.0 PAF (PAROXYSMAL ATRIAL FIBRILLATION): ICD-10-CM

## 2023-04-14 DIAGNOSIS — I70.0 AORTIC ATHEROSCLEROSIS: ICD-10-CM

## 2023-04-14 DIAGNOSIS — E11.69 TYPE 2 DIABETES MELLITUS WITH OTHER SPECIFIED COMPLICATION, WITHOUT LONG-TERM CURRENT USE OF INSULIN: Primary | ICD-10-CM

## 2023-04-14 PROCEDURE — 99214 PR OFFICE/OUTPT VISIT, EST, LEVL IV, 30-39 MIN: ICD-10-PCS | Mod: S$GLB,,, | Performed by: FAMILY MEDICINE

## 2023-04-14 PROCEDURE — 99214 OFFICE O/P EST MOD 30 MIN: CPT | Mod: S$GLB,,, | Performed by: FAMILY MEDICINE

## 2023-04-14 RX ORDER — SILDENAFIL 50 MG/1
50 TABLET, FILM COATED ORAL DAILY PRN
Qty: 10 TABLET | Refills: 2 | Status: SHIPPED | OUTPATIENT
Start: 2023-04-14 | End: 2023-07-09 | Stop reason: SDUPTHER

## 2023-04-14 RX ORDER — ALLOPURINOL 300 MG/1
300 TABLET ORAL DAILY
Qty: 90 TABLET | Refills: 3 | Status: SHIPPED | OUTPATIENT
Start: 2023-04-14 | End: 2023-10-18 | Stop reason: SDUPTHER

## 2023-04-14 RX ORDER — ATORVASTATIN CALCIUM 40 MG/1
40 TABLET, FILM COATED ORAL DAILY
Qty: 90 TABLET | Refills: 3 | Status: SHIPPED | OUTPATIENT
Start: 2023-04-14 | End: 2023-10-18 | Stop reason: SDUPTHER

## 2023-04-14 RX ORDER — METFORMIN HYDROCHLORIDE 500 MG/1
TABLET, EXTENDED RELEASE ORAL
Qty: 270 TABLET | Refills: 3 | Status: SHIPPED | OUTPATIENT
Start: 2023-04-14 | End: 2023-10-18 | Stop reason: SDUPTHER

## 2023-04-14 RX ORDER — GLIMEPIRIDE 2 MG/1
2 TABLET ORAL
Qty: 90 TABLET | Refills: 3 | Status: SHIPPED | OUTPATIENT
Start: 2023-04-14 | End: 2023-05-19 | Stop reason: SDUPTHER

## 2023-04-15 PROBLEM — I70.0 AORTIC ATHEROSCLEROSIS: Status: ACTIVE | Noted: 2023-04-15

## 2023-04-15 NOTE — PROGRESS NOTES
Subjective:       Patient ID: Trevor Johnston is a 67 y.o. male.    Chief Complaint: 3 month f/u and results for blood work  (/)    HPI  The patient is a 67-year-old who is here today for his 6 month follow-up.  He has recently been taking care of his mother who has had 3 hospitalizations and 1 stay in rehab since November.  She is currently in the hospital.  He recently had labs which reviewed today     Today we discussed followin) diabetes.  His recent A1c was 6.7.  He is taking Amaryl and Glucophage.  He said no hypoglycemic episodes  2)  Hypertension.  Today's blood pressure is 104/62.  He denies any symptoms of hypotension.  He is currently taking Avapro and Toprol  3) hyperlipidemia.  He is doing well with his Lipitor.  His recent total cholesterol was 175, his triglycerides were 309 and his LDL was 78   4)  Elevated PSA.  His recent PSA was elevated at 7.1.  His prior value was 6.2.  Of note, in 2021, he did have a prostate biopsy which was normal.  He does have an upcoming appointment with Dr. Connelly to discuss this further  5) kidney stones.  He has been having recurrent issues with kidney stones.  He did have lithotripsy in December  6) history of gout with hyperuricemia.  He is doing healthy allopurinol.  His recent uric acid level was 4.6.  He has had no recent episodes of gout  7) PAF.  He has had no palpitations.  He is taking Toprol and flecainide.  He does have upcoming appointment with his cardiologist          Review of Systems   Constitutional:  Negative for appetite change, chills, diaphoresis, fatigue, fever and unexpected weight change.   HENT:  Negative for congestion, ear pain, postnasal drip, rhinorrhea, sinus pressure, sneezing, sore throat and trouble swallowing.    Eyes:  Negative for pain, discharge and visual disturbance.   Respiratory:  Negative for cough, chest tightness, shortness of breath and wheezing.    Cardiovascular:  Negative for chest pain, palpitations and  leg swelling.   Gastrointestinal:  Negative for abdominal distention, abdominal pain, blood in stool, constipation, diarrhea, nausea and vomiting.   Skin:  Negative for rash.       Objective:      Physical Exam  Constitutional:       General: He is not in acute distress.     Appearance: Normal appearance. He is well-developed.   HENT:      Head: Normocephalic and atraumatic.      Right Ear: Hearing, tympanic membrane, ear canal and external ear normal.      Left Ear: Hearing, tympanic membrane, ear canal and external ear normal.      Nose: Nose normal.      Mouth/Throat:      Mouth: No oral lesions.      Pharynx: No oropharyngeal exudate or posterior oropharyngeal erythema.   Eyes:      General: Lids are normal. No scleral icterus.     Extraocular Movements: Extraocular movements intact.      Conjunctiva/sclera: Conjunctivae normal.      Pupils: Pupils are equal, round, and reactive to light.   Neck:      Thyroid: No thyroid mass or thyromegaly.      Vascular: No carotid bruit.   Cardiovascular:      Rate and Rhythm: Normal rate and regular rhythm. No extrasystoles are present.     Chest Wall: PMI is not displaced.      Pulses:           Dorsalis pedis pulses are 2+ on the right side and 2+ on the left side.        Posterior tibial pulses are 2+ on the right side and 2+ on the left side.      Heart sounds: Normal heart sounds. No murmur heard.    No gallop.   Pulmonary:      Effort: Pulmonary effort is normal. No accessory muscle usage or respiratory distress.      Breath sounds: Normal breath sounds.   Abdominal:      General: Bowel sounds are normal. There is no abdominal bruit.      Palpations: Abdomen is soft.      Tenderness: There is no abdominal tenderness. There is no rebound.   Musculoskeletal:      Cervical back: Normal range of motion and neck supple.      Right foot: No deformity.      Left foot: No deformity.   Feet:      Right foot:      Protective Sensation: 10 sites tested.  10 sites sensed.       "Skin integrity: Warmth present. No ulcer or callus.      Left foot:      Protective Sensation: 10 sites tested.  10 sites sensed.      Skin integrity: Warmth present. No ulcer or callus.   Lymphadenopathy:      Head:      Right side of head: No submental or submandibular adenopathy.      Left side of head: No submental or submandibular adenopathy.      Cervical:      Right cervical: No superficial, deep or posterior cervical adenopathy.     Left cervical: No superficial, deep or posterior cervical adenopathy.      Upper Body:      Right upper body: No supraclavicular adenopathy.      Left upper body: No supraclavicular adenopathy.   Skin:     General: Skin is warm and dry.   Neurological:      Mental Status: He is alert and oriented to person, place, and time.     Blood pressure 104/62, pulse 67, temperature 99.2 °F (37.3 °C), height 5' 5" (1.651 m), weight 82 kg (180 lb 12.4 oz), SpO2 97 %.Body mass index is 30.08 kg/m².            A/P:  1)  diabetes.  Well controlled.  Continue with Amaryl and Glucophage.  We will check an A1c in 6 months  2)  Hypertension.  Over controlled.  We are going to stop the Avapro.  We will continue with the Toprol.  We will recheck his blood pressure next week    3) hyperlipidemia.  Well controlled.  Continue with Lipitor.  We will recheck labs in 1 year   4)  Elevated PSA.  Progressing.  He will follow-up with Dr. Connelly his urologist soon as planned  5) kidney stones.  Currently asymptomatic.  Follow-up with urology as needed  6) history of gout with hyperuricemia.  Asymptomatic and well controlled.  Continue with allopurinol   7) PAF.  Asymptomatic.  Continue with Toprol flecainide.  Follow-up Cardiology as planned  8) aortic atherosclerosis.  Asymptomatic        As long as he does well, I will see him back in 6 months or sooner if needed    "

## 2023-04-21 ENCOUNTER — CLINICAL SUPPORT (OUTPATIENT)
Dept: FAMILY MEDICINE | Facility: CLINIC | Age: 68
End: 2023-04-21
Payer: MEDICARE

## 2023-04-21 VITALS — DIASTOLIC BLOOD PRESSURE: 78 MMHG | HEART RATE: 58 BPM | SYSTOLIC BLOOD PRESSURE: 138 MMHG

## 2023-04-21 NOTE — PROGRESS NOTES
,  /78, PULSE 58          Trevor Johnston 67 y.o. male is here today for Blood Pressure check.   History of HTN no.    Review of patient's allergies indicates:  No Known Allergies  Creatinine   Date Value Ref Range Status   03/13/2023 0.8 0.5 - 1.4 mg/dL Final     Sodium   Date Value Ref Range Status   03/13/2023 141 136 - 145 mmol/L Final     Potassium   Date Value Ref Range Status   03/13/2023 4.0 3.5 - 5.1 mmol/L Final   ]  Patient verifies taking blood pressure medications on a regular basis at the same time of the day.     Current Outpatient Medications:     allopurinoL (ZYLOPRIM) 300 MG tablet, Take 1 tablet (300 mg total) by mouth once daily., Disp: 90 tablet, Rfl: 3    atorvastatin (LIPITOR) 40 MG tablet, Take 1 tablet (40 mg total) by mouth once daily., Disp: 90 tablet, Rfl: 3    blood sugar diagnostic (FREESTYLE LITE STRIPS) Strp, USE TO CHECK BLOOD GLUCOSE ONCE A DAY, Disp: 100 each, Rfl: 3    blood sugar diagnostic Strp, , Disp: , Rfl:     blood sugar diagnostic Strp, To check BG 1 times daily, to use with insurance preferred meter, Disp: 200 each, Rfl: 1    blood-glucose meter kit, To check BG 1 times daily, to use with insurance preferred meter, Disp: 1 each, Rfl: 0    brimonidine 0.2% (ALPHAGAN) 0.2 % Drop, Place 1 drop into the right eye 2 (two) times a day. , Disp: , Rfl:     dorzolamide-timolol 2-0.5% (COSOPT) 22.3-6.8 mg/mL ophthalmic solution, Place 1 drop into the right eye 2 (two) times daily. , Disp: , Rfl:     flecainide (TAMBOCOR) 100 MG Tab, TAKE 1 TABLET EVERY 12 HOURS, Disp: 180 tablet, Rfl: 3    fluticasone propionate (FLONASE) 50 mcg/actuation nasal spray, 2 sprays (100 mcg total) by Each Nostril route once daily., Disp: 15.8 mL, Rfl: 2    gabapentin (NEURONTIN) 300 MG capsule, TAKE 1 CAPSULE EVERY EVENING, Disp: 90 capsule, Rfl: 3    glimepiride (AMARYL) 2 MG tablet, Take 1 tablet (2 mg total) by mouth daily with breakfast., Disp: 90 tablet, Rfl: 3    lancets (FREESTYLE  LANCETS) 28 gauge Misc, USE TO CHECK BLOOD GLUCOSE ONCE A DAY, Disp: 100 each, Rfl: 3    lancets Misc, To check BG 1 times daily, to use with insurance preferred meter, Disp: 200 each, Rfl: 1    LUMIGAN 0.01 % Drop, Place 1 drop into both eyes every evening., Disp: , Rfl:     metFORMIN (GLUCOPHAGE-XR) 500 MG ER 24hr tablet, Take 1 tablet (500 mg total) by mouth daily with breakfast AND 2 tablets (1,000 mg total) every evening., Disp: 270 tablet, Rfl: 3    metoprolol succinate (TOPROL-XL) 25 MG 24 hr tablet, TAKE 1 TABLET TWICE A DAY, Disp: 180 tablet, Rfl: 3    rOPINIRole (REQUIP) 0.5 MG tablet, TAKE 1 TABLET EVERY EVENING, Disp: 90 tablet, Rfl: 1    sildenafiL (VIAGRA) 50 MG tablet, Take 1 tablet (50 mg total) by mouth daily as needed for Erectile Dysfunction., Disp: 10 tablet, Rfl: 2    ULTRA THIN LANCETS 30 gauge Misc, USE TO CHECK BLOOD GLUCOSE ONCE DAILY, Disp: , Rfl:     XARELTO 20 mg Tab, TAKE 1 TABLET EVERY EVENING, Disp: 90 tablet, Rfl: 4  No current facility-administered medications for this visit.    Facility-Administered Medications Ordered in Other Visits:     ciprofloxacin (CIPRO)400mg/200ml D5W IVPB 400 mg, 400 mg, Intravenous, On Call Procedure, Sb Connelly MD, 400 mg at 12/29/22 1115    diphenhydrAMINE injection 25 mg, 25 mg, Intravenous, Q6H PRN, Ayan Stephens MD    HYDROmorphone injection 0.5 mg, 0.5 mg, Intravenous, Q5 Min PRN, Ayan Stephens MD    lactated ringers infusion, , Intravenous, Continuous, Tammy Marvin MD, Last Rate: 10 mL/hr at 08/12/20 1150, New Bag at 08/12/20 1249    lactated ringers infusion, , Intravenous, Continuous, Finesse De La Rosa MD, Stopped at 03/17/21 1035    lactated ringers infusion, , Intravenous, Continuous, Finesse De La Rosa MD, Stopped at 12/29/22 1315    lidocaine (PF) 10 mg/ml (1%) injection 10 mg, 1 mL, Intradermal, Once, Tammy Marvin MD    LIDOcaine (PF) 10 mg/ml (1%) injection 10 mg, 1 mL, Other, Once, Finesse De La Rosa,  MD    ondansetron injection 4 mg, 4 mg, Intravenous, Daily PRN, Ayan Stephens MD    prochlorperazine injection Soln 5 mg, 5 mg, Intravenous, Q30 Min PRN, Ayan Stephens MD    sodium chloride 0.9% bolus 250 mL 250 mL, 250 mL, Intravenous, Once, Ayan Stephens MD    Last dose of blood pressure medication was taken at last night/ 11:00 pm.  Patient is asymptomatic.

## 2023-04-25 ENCOUNTER — PATIENT MESSAGE (OUTPATIENT)
Dept: FAMILY MEDICINE | Facility: CLINIC | Age: 68
End: 2023-04-25
Payer: MEDICARE

## 2023-04-26 RX ORDER — IRBESARTAN 75 MG/1
75 TABLET ORAL NIGHTLY
Qty: 30 TABLET | Refills: 1 | Status: SHIPPED | OUTPATIENT
Start: 2023-04-26 | End: 2023-05-19 | Stop reason: SDUPTHER

## 2023-04-26 NOTE — TELEPHONE ENCOUNTER
Let's resume a lower dose of avapro   Let's recheck BP in 2 wks      MANDY-  Can you or leader address why this was not sent to me for me to review?

## 2023-05-10 ENCOUNTER — CLINICAL SUPPORT (OUTPATIENT)
Dept: FAMILY MEDICINE | Facility: CLINIC | Age: 68
End: 2023-05-10
Payer: MEDICARE

## 2023-05-10 VITALS — DIASTOLIC BLOOD PRESSURE: 68 MMHG | SYSTOLIC BLOOD PRESSURE: 136 MMHG | OXYGEN SATURATION: 95 % | HEART RATE: 70 BPM

## 2023-05-10 DIAGNOSIS — I48.0 PAROXYSMAL ATRIAL FIBRILLATION: ICD-10-CM

## 2023-05-10 DIAGNOSIS — Z01.30 BP CHECK: Primary | ICD-10-CM

## 2023-05-10 NOTE — PROGRESS NOTES
BP: 136/68 , Pulse: 70    Blood pressure reading after 15 minutes was 136/68, Pulse 70.  Dr. Doe notified.    Trevor Johnston 67 y.o. male is here today for Blood Pressure check.   History of HTN yes.    Review of patient's allergies indicates:  No Known Allergies  Creatinine   Date Value Ref Range Status   03/13/2023 0.8 0.5 - 1.4 mg/dL Final     Sodium   Date Value Ref Range Status   03/13/2023 141 136 - 145 mmol/L Final     Potassium   Date Value Ref Range Status   03/13/2023 4.0 3.5 - 5.1 mmol/L Final   ]  Patient verifies taking blood pressure medications on a regular basis at the same time of the day.     Current Outpatient Medications:     allopurinoL (ZYLOPRIM) 300 MG tablet, Take 1 tablet (300 mg total) by mouth once daily., Disp: 90 tablet, Rfl: 3    atorvastatin (LIPITOR) 40 MG tablet, Take 1 tablet (40 mg total) by mouth once daily., Disp: 90 tablet, Rfl: 3    blood sugar diagnostic (FREESTYLE LITE STRIPS) Strp, USE TO CHECK BLOOD GLUCOSE ONCE A DAY, Disp: 100 each, Rfl: 3    blood sugar diagnostic Strp, , Disp: , Rfl:     blood sugar diagnostic Strp, To check BG 1 times daily, to use with insurance preferred meter, Disp: 200 each, Rfl: 1    blood-glucose meter kit, To check BG 1 times daily, to use with insurance preferred meter, Disp: 1 each, Rfl: 0    brimonidine 0.2% (ALPHAGAN) 0.2 % Drop, Place 1 drop into the right eye 2 (two) times a day. , Disp: , Rfl:     dorzolamide-timolol 2-0.5% (COSOPT) 22.3-6.8 mg/mL ophthalmic solution, Place 1 drop into the right eye 2 (two) times daily. , Disp: , Rfl:     flecainide (TAMBOCOR) 100 MG Tab, TAKE 1 TABLET EVERY 12 HOURS, Disp: 180 tablet, Rfl: 3    fluticasone propionate (FLONASE) 50 mcg/actuation nasal spray, 2 sprays (100 mcg total) by Each Nostril route once daily., Disp: 15.8 mL, Rfl: 2    gabapentin (NEURONTIN) 300 MG capsule, TAKE 1 CAPSULE EVERY EVENING, Disp: 90 capsule, Rfl: 3    glimepiride (AMARYL) 2 MG tablet, Take 1 tablet (2 mg total) by  mouth daily with breakfast., Disp: 90 tablet, Rfl: 3    irbesartan (AVAPRO) 75 MG tablet, Take 1 tablet (75 mg total) by mouth every evening., Disp: 30 tablet, Rfl: 1    lancets (FREESTYLE LANCETS) 28 gauge Misc, USE TO CHECK BLOOD GLUCOSE ONCE A DAY, Disp: 100 each, Rfl: 3    lancets Misc, To check BG 1 times daily, to use with insurance preferred meter, Disp: 200 each, Rfl: 1    LUMIGAN 0.01 % Drop, Place 1 drop into both eyes every evening., Disp: , Rfl:     metFORMIN (GLUCOPHAGE-XR) 500 MG ER 24hr tablet, Take 1 tablet (500 mg total) by mouth daily with breakfast AND 2 tablets (1,000 mg total) every evening., Disp: 270 tablet, Rfl: 3    metoprolol succinate (TOPROL-XL) 25 MG 24 hr tablet, TAKE 1 TABLET TWICE A DAY, Disp: 180 tablet, Rfl: 3    rOPINIRole (REQUIP) 0.5 MG tablet, TAKE 1 TABLET EVERY EVENING, Disp: 90 tablet, Rfl: 1    sildenafiL (VIAGRA) 50 MG tablet, Take 1 tablet (50 mg total) by mouth daily as needed for Erectile Dysfunction., Disp: 10 tablet, Rfl: 2    ULTRA THIN LANCETS 30 gauge Misc, USE TO CHECK BLOOD GLUCOSE ONCE DAILY, Disp: , Rfl:     XARELTO 20 mg Tab, TAKE 1 TABLET EVERY EVENING, Disp: 90 tablet, Rfl: 4  No current facility-administered medications for this visit.    Facility-Administered Medications Ordered in Other Visits:     ciprofloxacin (CIPRO)400mg/200ml D5W IVPB 400 mg, 400 mg, Intravenous, On Call Procedure, Sb Connelly MD, 400 mg at 12/29/22 1115    diphenhydrAMINE injection 25 mg, 25 mg, Intravenous, Q6H PRN, Ayan Stephens MD    HYDROmorphone injection 0.5 mg, 0.5 mg, Intravenous, Q5 Min PRN, Ayan Stephens MD    lactated ringers infusion, , Intravenous, Continuous, Tammy Marvin MD, Last Rate: 10 mL/hr at 08/12/20 1150, New Bag at 08/12/20 1249    lactated ringers infusion, , Intravenous, Continuous, Finesse De La Rosa MD, Stopped at 03/17/21 1035    lactated ringers infusion, , Intravenous, Continuous, Finesse De La Rosa MD, Stopped at 12/29/22  1315    lidocaine (PF) 10 mg/ml (1%) injection 10 mg, 1 mL, Intradermal, Once, Tammy Marvin MD    LIDOcaine (PF) 10 mg/ml (1%) injection 10 mg, 1 mL, Other, Once, Finesse De La Rosa MD    ondansetron injection 4 mg, 4 mg, Intravenous, Daily PRN, Ayan Stephens MD    prochlorperazine injection Soln 5 mg, 5 mg, Intravenous, Q30 Min PRN, Ayan Stephens MD    sodium chloride 0.9% bolus 250 mL 250 mL, 250 mL, Intravenous, Once, Ayan Stephens MD  Does patient have record of home blood pressure readings no. Readings have been averaging n/a.   Last dose of blood pressure medication was taken at 1:00 am .  Patient is asymptomatic.   Complains of n/a.

## 2023-05-19 RX ORDER — IRBESARTAN 75 MG/1
75 TABLET ORAL NIGHTLY
Qty: 90 TABLET | Refills: 2 | Status: SHIPPED | OUTPATIENT
Start: 2023-05-19 | End: 2023-12-27

## 2023-05-19 RX ORDER — GLIMEPIRIDE 2 MG/1
2 TABLET ORAL
Qty: 90 TABLET | Refills: 2 | Status: SHIPPED | OUTPATIENT
Start: 2023-05-19 | End: 2023-10-23

## 2023-05-19 NOTE — TELEPHONE ENCOUNTER
Spoke to patient.  Meds were filled at [facility] in April.  He would like for them to be sent to [facility].

## 2023-05-19 NOTE — TELEPHONE ENCOUNTER
No care due was identified.  [facility] Embedded Care Due Messages. Reference number: [id number].   5/19/2023 10:48:39 AM CDT

## 2023-06-05 RX ORDER — ROPINIROLE 0.5 MG/1
0.5 TABLET, FILM COATED ORAL NIGHTLY
Qty: 90 TABLET | Refills: 3 | Status: SHIPPED | OUTPATIENT
Start: 2023-06-05

## 2023-06-22 NOTE — TELEPHONE ENCOUNTER
Care Due:                  Date            Visit Type   Department     Provider  --------------------------------------------------------------------------------                                EP -                              PRIMARY      [facility]    Blanca Brownlee Nader  Last Visit: 04-      CARE ([facility])   MEDICINE       Anger                              EP -                              PRIMARY      [facility]    Blanca Brownlee Nader  Next Visit: 10-      CARE ([facility])   MEDICINE       Anger                                                            Last  Test          Frequency    Reason                     Performed    Due Date  --------------------------------------------------------------------------------    HBA1C.......  6 months...  glimepiride, metFORMIN...  03- 09-    [facility] Embedded Care Due Messages. Reference number: [id number].   6/22/2023 2:20:24 PM CDT

## 2023-06-22 NOTE — TELEPHONE ENCOUNTER
Fax received from [facility].  Glimepiride 1 and 2 mg tablets are unavailable.  Pt could either be prescribed the 4 mg which are scored or look at a local pharmacy to see if available.  Called pt, he prefers to take a half of the 4 mg tablets.  Please advise.

## 2023-06-23 RX ORDER — GLIMEPIRIDE 4 MG/1
2 TABLET ORAL
Qty: 45 TABLET | Refills: 1 | Status: SHIPPED | OUTPATIENT
Start: 2023-06-23 | End: 2023-10-23

## 2023-07-09 RX ORDER — SILDENAFIL 50 MG/1
50 TABLET, FILM COATED ORAL DAILY PRN
Qty: 10 TABLET | Refills: 2 | Status: SHIPPED | OUTPATIENT
Start: 2023-07-09 | End: 2023-07-09

## 2023-07-09 RX ORDER — SILDENAFIL 100 MG/1
100 TABLET, FILM COATED ORAL DAILY PRN
Qty: 10 TABLET | Refills: 2 | Status: SHIPPED | OUTPATIENT
Start: 2023-07-09 | End: 2024-02-13 | Stop reason: SDUPTHER

## 2023-07-09 NOTE — TELEPHONE ENCOUNTER
No care due was identified.  [facility] Embedded Care Due Messages. Reference number: [id number].   7/09/2023 5:26:46 PM CDT

## 2023-08-23 RX ORDER — FLECAINIDE ACETATE 100 MG/1
TABLET ORAL
Qty: 180 TABLET | Refills: 3 | Status: SHIPPED | OUTPATIENT
Start: 2023-08-23

## 2023-08-27 NOTE — TELEPHONE ENCOUNTER
Care Due:                  Date            Visit Type   Department     Provider  --------------------------------------------------------------------------------                                EP -                              PRIMARY      [facility]    Blanca Brownlee Nader  Last Visit: 04-      CARE ([facility])   MEDICINE       Anger                              EP -                              PRIMARY      [facility]    Blanca Brownlee Nader  Next Visit: 10-      CARE ([facility])   MEDICINE       Anger                                                            Last  Test          Frequency    Reason                     Performed    Due Date  --------------------------------------------------------------------------------    HBA1C.......  6 months...  glimepiride, metFORMIN...  03- 09-    [facility] Embedded Care Due Messages. Reference number: [id number].   8/27/2023 9:48:15 AM CDT

## 2023-08-28 RX ORDER — GLIMEPIRIDE 1 MG/1
1 TABLET ORAL
Qty: 90 TABLET | Refills: 3 | OUTPATIENT
Start: 2023-08-28

## 2023-08-28 NOTE — TELEPHONE ENCOUNTER
Refill Decision Note   Trevor Johnston  is requesting a refill authorization.  Brief Assessment and Rationale for Refill:  Quick Discontinue     Medication Therapy Plan:  FLOS;  discontinued 6/27/2022 by Blanca Doe MD; medication adjustment      Comments:     Note composed:12:27 PM 08/28/2023             Appointments     Last Visit   4/14/2023 Blanca Doe MD   Next Visit   10/23/2023 Blanca Doe MD

## 2023-09-20 ENCOUNTER — TELEPHONE (OUTPATIENT)
Dept: FAMILY MEDICINE | Facility: CLINIC | Age: 68
End: 2023-09-20
Payer: MEDICARE

## 2023-09-20 NOTE — TELEPHONE ENCOUNTER
----- Message from Servando Rasmussen sent at 9/20/2023  7:04 AM CDT -----  Contact: Pt. Portal  .Type:  Needs Medical Advice    Who Called: pt    Would the patient rather a call back or a response via [facility]?  Call back  Best Call Back Number: [phone]  Additional Information: Want to schedule lab for A1c

## 2023-09-23 ENCOUNTER — IMMUNIZATION (OUTPATIENT)
Dept: FAMILY MEDICINE | Facility: CLINIC | Age: 68
End: 2023-09-23
Payer: MEDICARE

## 2023-09-23 PROCEDURE — 99999PBSHW FLU VACCINE - QUADRIVALENT - ADJUVANTED: ICD-10-PCS | Mod: PBBFAC,,,

## 2023-09-23 PROCEDURE — G0008 ADMIN INFLUENZA VIRUS VAC: HCPCS | Mod: PBBFAC,PO

## 2023-09-23 PROCEDURE — 99999PBSHW FLU VACCINE - QUADRIVALENT - ADJUVANTED: Mod: PBBFAC,,,

## 2023-10-05 ENCOUNTER — OFFICE VISIT (OUTPATIENT)
Dept: URGENT CARE | Facility: CLINIC | Age: 68
End: 2023-10-05
Payer: MEDICARE

## 2023-10-05 VITALS
HEIGHT: 65 IN | DIASTOLIC BLOOD PRESSURE: 86 MMHG | TEMPERATURE: 97 F | BODY MASS INDEX: 29.66 KG/M2 | HEART RATE: 66 BPM | RESPIRATION RATE: 16 BRPM | WEIGHT: 178 LBS | OXYGEN SATURATION: 98 % | SYSTOLIC BLOOD PRESSURE: 130 MMHG

## 2023-10-05 DIAGNOSIS — M62.838 MUSCLE SPASM: Primary | ICD-10-CM

## 2023-10-05 PROCEDURE — 99213 OFFICE O/P EST LOW 20 MIN: CPT | Mod: S$GLB,,, | Performed by: PHYSICIAN ASSISTANT

## 2023-10-05 PROCEDURE — 99213 PR OFFICE/OUTPT VISIT, EST, LEVL III, 20-29 MIN: ICD-10-PCS | Mod: S$GLB,,, | Performed by: PHYSICIAN ASSISTANT

## 2023-10-05 RX ORDER — PREDNISONE 20 MG/1
20 TABLET ORAL DAILY
Qty: 3 TABLET | Refills: 0 | Status: SHIPPED | OUTPATIENT
Start: 2023-10-05 | End: 2023-10-23

## 2023-10-05 RX ORDER — TIZANIDINE 4 MG/1
4 TABLET ORAL EVERY 6 HOURS PRN
Qty: 20 TABLET | Refills: 0 | Status: SHIPPED | OUTPATIENT
Start: 2023-10-05 | End: 2023-10-23

## 2023-10-05 NOTE — PATIENT INSTRUCTIONS
- You received a steroid pill today.  This can elevate your blood pressure, elevate your blood sugar, water weight gain, nervous energy, redness to the face and dimpling of the skin where the shot goes in.   - Do not use steroids more than 3 times per year.   - If you have diabetes, please check you blood sugar frequently.  - If you have high blood pressure, please check your blood pressure frequently.       You must understand that you've received an Urgent Care treatment only and that you may be released before all your medical problems are known or treated. You, the patient, will arrange for follow up care as instructed.  Follow up with your PCP or specialty clinic as directed if not improved or as needed. You can call [phone] to schedule an appointment with the appropriate provider.  If your condition worsens we recommend that you receive another evaluation at the Emergency Department for any concerns or worsening of condition.  Patient aware and verbalized understanding.

## 2023-10-05 NOTE — PROGRESS NOTES
"Subjective:      Patient ID: Trevor Johnston is a 67 y.o. male.    Vitals:  height is 5' 5" (1.651 m) and weight is 80.7 kg (178 lb). His oral temperature is 97.4 °F (36.3 °C). His blood pressure is 130/86 and his pulse is 66. His respiration is 16 and oxygen saturation is 98%.     Chief Complaint: Back Pain    Pt presents to urgent care with back pain.  He states that it has been going on for about a month on and off.  He bends over and has trouble getting back up.  Pain scale- 9. He has been taking tylenol for the pain with no relief.  The pain is on the right back area.     Back Pain  This is a new problem. The current episode started 1 to 4 weeks ago. The problem occurs constantly. The problem is unchanged. The pain does not radiate. The pain is at a severity of 9/10. The pain is severe. The symptoms are aggravated by bending, sitting and standing. Pertinent negatives include no abdominal pain, chest pain, fever, headaches or numbness. Treatments tried: tylenol. The treatment provided no relief.       Constitution: Negative for chills, sweating, fatigue and fever.   HENT:  Negative for ear pain, drooling, congestion, sore throat, trouble swallowing and voice change.    Neck: Negative for neck pain, neck stiffness and painful lymph nodes.   Cardiovascular:  Negative for chest pain, leg swelling, palpitations, sob on exertion and passing out.   Eyes:  Negative for eye discharge, eye itching, eye pain, eye redness and eyelid swelling.   Respiratory:  Negative for chest tightness, cough, sputum production, bloody sputum, shortness of breath, stridor and wheezing.    Gastrointestinal:  Negative for abdominal pain, abdominal bloating, nausea, vomiting, constipation, diarrhea and heartburn.   Genitourinary:  Negative for urine decreased.   Musculoskeletal:  Positive for back pain and muscle ache. Negative for joint pain, joint swelling, abnormal ROM of joint, pain with walking and muscle cramps.   Skin:  Negative for " rash and hives.   Allergic/Immunologic: Negative for hives, itching and sneezing.   Neurological:  Negative for dizziness, light-headedness, passing out, loss of balance, headaches, altered mental status, loss of consciousness, numbness and seizures.   Hematologic/Lymphatic: Negative for swollen lymph nodes.   Psychiatric/Behavioral:  Negative for altered mental status and nervous/anxious. The patient is not nervous/anxious.       Objective:     Physical Exam   Constitutional: He is oriented to person, place, and time. He appears well-developed. He is cooperative.   HENT:   Head: Normocephalic and atraumatic.   Ears:   Right Ear: Hearing, tympanic membrane, external ear and ear canal normal.   Left Ear: Hearing, tympanic membrane, external ear and ear canal normal.   Nose: Nose normal. No mucosal edema or nasal deformity. No epistaxis. Right sinus exhibits no maxillary sinus tenderness and no frontal sinus tenderness. Left sinus exhibits no maxillary sinus tenderness and no frontal sinus tenderness.   Mouth/Throat: Uvula is midline, oropharynx is clear and moist and mucous membranes are normal. No trismus in the jaw. Normal dentition. No uvula swelling.   Eyes: Conjunctivae and lids are normal.   Neck: Trachea normal and phonation normal. Neck supple.   Cardiovascular: Normal rate, regular rhythm, normal heart sounds and normal pulses.   Pulmonary/Chest: Effort normal and breath sounds normal.   Abdominal: Normal appearance and bowel sounds are normal. Soft.   Musculoskeletal: Normal range of motion.         General: Tenderness present. Normal range of motion.      Comments: +ttp rightlower back no midline tenderness. Non radiating. Gait normal   Neurological: He is alert and oriented to person, place, and time. He exhibits normal muscle tone.   Skin: Skin is warm, dry and intact.   Psychiatric: His speech is normal and behavior is normal. Judgment and thought content normal.   Nursing note and vitals  reviewed.      Assessment:     1. Muscle spasm        Plan:       Muscle spasm    Other orders  -     predniSONE (DELTASONE) 20 MG tablet; Take 1 tablet (20 mg total) by mouth once daily. for 3 days  Dispense: 3 tablet; Refill: 0  -     tiZANidine (ZANAFLEX) 4 MG tablet; Take 1 tablet (4 mg total) by mouth every 6 (six) hours as needed (muscle spasm).  Dispense: 20 tablet; Refill: 0             Patient Instructions   - You received a steroid pill today.  This can elevate your blood pressure, elevate your blood sugar, water weight gain, nervous energy, redness to the face and dimpling of the skin where the shot goes in.   - Do not use steroids more than 3 times per year.   - If you have diabetes, please check you blood sugar frequently.  - If you have high blood pressure, please check your blood pressure frequently.       You must understand that you've received an Urgent Care treatment only and that you may be released before all your medical problems are known or treated. You, the patient, will arrange for follow up care as instructed.  Follow up with your PCP or specialty clinic as directed if not improved or as needed. You can call [phone] to schedule an appointment with the appropriate provider.  If your condition worsens we recommend that you receive another evaluation at the Emergency Department for any concerns or worsening of condition.  Patient aware and verbalized understanding.

## 2023-10-09 DIAGNOSIS — I48.0 PAF (PAROXYSMAL ATRIAL FIBRILLATION): ICD-10-CM

## 2023-10-09 RX ORDER — RIVAROXABAN 20 MG/1
TABLET, FILM COATED ORAL
Qty: 90 TABLET | Refills: 3 | Status: SHIPPED | OUTPATIENT
Start: 2023-10-09

## 2023-10-16 ENCOUNTER — LAB VISIT (OUTPATIENT)
Dept: LAB | Facility: HOSPITAL | Age: 68
End: 2023-10-16
Payer: MEDICARE

## 2023-10-16 DIAGNOSIS — E78.2 MIXED HYPERLIPIDEMIA: ICD-10-CM

## 2023-10-16 DIAGNOSIS — E11.69 TYPE 2 DIABETES MELLITUS WITH OTHER SPECIFIED COMPLICATION, WITHOUT LONG-TERM CURRENT USE OF INSULIN: ICD-10-CM

## 2023-10-16 LAB
ALBUMIN SERPL BCP-MCNC: 4 G/DL (ref 3.5–5.2)
ALP SERPL-CCNC: 84 U/L (ref 55–135)
ALT SERPL W/O P-5'-P-CCNC: 31 U/L (ref 10–44)
ANION GAP SERPL CALC-SCNC: 9 MMOL/L (ref 8–16)
AST SERPL-CCNC: 24 U/L (ref 10–40)
BILIRUB SERPL-MCNC: 0.5 MG/DL (ref 0.1–1)
BUN SERPL-MCNC: 14 MG/DL (ref 8–23)
CALCIUM SERPL-MCNC: 9.8 MG/DL (ref 8.7–10.5)
CHLORIDE SERPL-SCNC: 108 MMOL/L (ref 95–110)
CHOLEST SERPL-MCNC: 142 MG/DL (ref 120–199)
CHOLEST/HDLC SERPL: 3.5 {RATIO} (ref 2–5)
CK SERPL-CCNC: 62 U/L (ref 20–200)
CO2 SERPL-SCNC: 24 MMOL/L (ref 23–29)
CREAT SERPL-MCNC: 0.8 MG/DL (ref 0.5–1.4)
EST. GFR  (NO RACE VARIABLE): >60 ML/MIN/1.73 M^2
ESTIMATED AVG GLUCOSE: 131 MG/DL (ref 68–131)
GLUCOSE SERPL-MCNC: 132 MG/DL (ref 70–110)
HBA1C MFR BLD: 6.2 % (ref 4–5.6)
HDLC SERPL-MCNC: 41 MG/DL (ref 40–75)
HDLC SERPL: 28.9 % (ref 20–50)
LDLC SERPL CALC-MCNC: 81.6 MG/DL (ref 63–159)
NONHDLC SERPL-MCNC: 101 MG/DL
POTASSIUM SERPL-SCNC: 4.1 MMOL/L (ref 3.5–5.1)
PROT SERPL-MCNC: 7.5 G/DL (ref 6–8.4)
SODIUM SERPL-SCNC: 141 MMOL/L (ref 136–145)
T4 FREE SERPL-MCNC: 0.86 NG/DL (ref 0.71–1.51)
TRIGL SERPL-MCNC: 97 MG/DL (ref 30–150)
TSH SERPL DL<=0.005 MIU/L-ACNC: 1.06 UIU/ML (ref 0.4–4)

## 2023-10-16 PROCEDURE — 84439 ASSAY OF FREE THYROXINE: CPT | Performed by: INTERNAL MEDICINE

## 2023-10-16 PROCEDURE — 36415 COLL VENOUS BLD VENIPUNCTURE: CPT | Mod: PO | Performed by: INTERNAL MEDICINE

## 2023-10-16 PROCEDURE — 83036 HEMOGLOBIN GLYCOSYLATED A1C: CPT | Performed by: FAMILY MEDICINE

## 2023-10-16 PROCEDURE — 80053 COMPREHEN METABOLIC PANEL: CPT | Performed by: INTERNAL MEDICINE

## 2023-10-16 PROCEDURE — 82550 ASSAY OF CK (CPK): CPT | Performed by: INTERNAL MEDICINE

## 2023-10-16 PROCEDURE — 84443 ASSAY THYROID STIM HORMONE: CPT | Performed by: INTERNAL MEDICINE

## 2023-10-16 PROCEDURE — 80061 LIPID PANEL: CPT | Performed by: INTERNAL MEDICINE

## 2023-10-17 ENCOUNTER — PATIENT MESSAGE (OUTPATIENT)
Dept: FAMILY MEDICINE | Facility: CLINIC | Age: 68
End: 2023-10-17
Payer: MEDICARE

## 2023-10-18 DIAGNOSIS — N20.0 KIDNEY STONES: ICD-10-CM

## 2023-10-18 DIAGNOSIS — E78.5 HYPERLIPIDEMIA, UNSPECIFIED HYPERLIPIDEMIA TYPE: ICD-10-CM

## 2023-10-18 DIAGNOSIS — E11.69 TYPE 2 DIABETES MELLITUS WITH OTHER SPECIFIED COMPLICATION, WITHOUT LONG-TERM CURRENT USE OF INSULIN: ICD-10-CM

## 2023-10-18 NOTE — TELEPHONE ENCOUNTER
No care due was identified.  [facility] Embedded Care Due Messages. Reference number: [id number].   10/18/2023 1:26:46 PM CDT

## 2023-10-19 RX ORDER — ALLOPURINOL 300 MG/1
300 TABLET ORAL DAILY
Qty: 90 TABLET | Refills: 1 | Status: SHIPPED | OUTPATIENT
Start: 2023-10-19

## 2023-10-19 RX ORDER — METFORMIN HYDROCHLORIDE 500 MG/1
TABLET, EXTENDED RELEASE ORAL
Qty: 270 TABLET | Refills: 1 | Status: SHIPPED | OUTPATIENT
Start: 2023-10-19

## 2023-10-19 RX ORDER — ATORVASTATIN CALCIUM 40 MG/1
40 TABLET, FILM COATED ORAL DAILY
Qty: 90 TABLET | Refills: 1 | Status: SHIPPED | OUTPATIENT
Start: 2023-10-19

## 2023-10-19 NOTE — TELEPHONE ENCOUNTER
Refill Decision Note   Trevor Vickie  is requesting a refill authorization.  Brief Assessment and Rationale for Refill:  Approve     Medication Therapy Plan:       Medication Reconciliation Completed: No   Comments:     No Care Gaps recommended.     Note composed:10:04 AM 10/19/2023

## 2023-10-23 ENCOUNTER — OFFICE VISIT (OUTPATIENT)
Dept: FAMILY MEDICINE | Facility: CLINIC | Age: 68
End: 2023-10-23
Payer: MEDICARE

## 2023-10-23 VITALS
TEMPERATURE: 98 F | BODY MASS INDEX: 27.46 KG/M2 | HEART RATE: 63 BPM | DIASTOLIC BLOOD PRESSURE: 78 MMHG | WEIGHT: 164.81 LBS | RESPIRATION RATE: 18 BRPM | OXYGEN SATURATION: 98 % | HEIGHT: 65 IN | SYSTOLIC BLOOD PRESSURE: 138 MMHG

## 2023-10-23 DIAGNOSIS — E11.59 HYPERTENSION ASSOCIATED WITH DIABETES: ICD-10-CM

## 2023-10-23 DIAGNOSIS — Z12.5 ENCOUNTER FOR SCREENING FOR MALIGNANT NEOPLASM OF PROSTATE: ICD-10-CM

## 2023-10-23 DIAGNOSIS — E11.69 HYPERLIPIDEMIA ASSOCIATED WITH TYPE 2 DIABETES MELLITUS: ICD-10-CM

## 2023-10-23 DIAGNOSIS — E78.5 HYPERLIPIDEMIA ASSOCIATED WITH TYPE 2 DIABETES MELLITUS: ICD-10-CM

## 2023-10-23 DIAGNOSIS — I15.2 HYPERTENSION ASSOCIATED WITH DIABETES: ICD-10-CM

## 2023-10-23 DIAGNOSIS — E11.8 DIABETES MELLITUS TYPE 2 WITH COMPLICATIONS: Primary | ICD-10-CM

## 2023-10-23 DIAGNOSIS — E79.0 HYPERURICEMIA: ICD-10-CM

## 2023-10-23 PROCEDURE — G0009 PNEUMOCOCCAL CONJUGATE VACCINE 20-VALENT: ICD-10-PCS | Mod: S$GLB,,, | Performed by: FAMILY MEDICINE

## 2023-10-23 PROCEDURE — 99214 PR OFFICE/OUTPT VISIT, EST, LEVL IV, 30-39 MIN: ICD-10-PCS | Mod: S$GLB,,, | Performed by: FAMILY MEDICINE

## 2023-10-23 PROCEDURE — 99214 OFFICE O/P EST MOD 30 MIN: CPT | Mod: S$GLB,,, | Performed by: FAMILY MEDICINE

## 2023-10-23 PROCEDURE — 90677 PCV20 VACCINE IM: CPT | Mod: S$GLB,,, | Performed by: FAMILY MEDICINE

## 2023-10-23 PROCEDURE — 90677 PNEUMOCOCCAL CONJUGATE VACCINE 20-VALENT: ICD-10-PCS | Mod: S$GLB,,, | Performed by: FAMILY MEDICINE

## 2023-10-23 PROCEDURE — G0009 ADMIN PNEUMOCOCCAL VACCINE: HCPCS | Mod: S$GLB,,, | Performed by: FAMILY MEDICINE

## 2023-10-23 NOTE — PROGRESS NOTES
Subjective:       Patient ID: Trevor Johnston is a 68 y.o. male.    Chief Complaint: Follow-up    HPI  The patient is a 68-year-old who is here today for follow-up.  Overall, he has been doing well.      Today we did followin)  Diabetes.  His recent A1c was 6.2 down from prior value of 6.7.  He is taking Glucophage  mg in the morning and 1000 mg in the evening and Amaryl 2 mg at night.  He did have an episode last night where he became hot and shaky.  He ate something and his symptoms went away.  He did not check his sugars with this event.  This typically does not happen.  He does not check his sugars outside the office  2) hypertension.  Today's blood pressure is 138/78.  He is taking Toprol and Avapro   3)  Hyperlipidemia.  He is taking his Lipitor.  His recent total cholesterol was 142 and his LDL was 81  4)  PAF.  He is following with his cardiologist.  He is taking flecainide and Xarelto.    5)  RLS.  He is taking his Requip consistently and this works well for him.    Review of Systems   Constitutional:  Negative for appetite change, chills, diaphoresis, fatigue, fever and unexpected weight change.   HENT:  Negative for congestion, ear pain, postnasal drip, rhinorrhea, sinus pressure, sneezing, sore throat and trouble swallowing.    Eyes:  Negative for pain, discharge and visual disturbance.   Respiratory:  Negative for cough, chest tightness, shortness of breath and wheezing.    Cardiovascular:  Negative for chest pain, palpitations and leg swelling.   Gastrointestinal:  Negative for abdominal distention, abdominal pain, blood in stool, constipation, diarrhea, nausea and vomiting.   Skin:  Negative for rash.         Objective:      Physical Exam  Constitutional:       General: He is not in acute distress.     Appearance: Normal appearance. He is well-developed.   HENT:      Head: Normocephalic and atraumatic.      Right Ear: Hearing, tympanic membrane, ear canal and external ear normal.       "Left Ear: Hearing, tympanic membrane, ear canal and external ear normal.      Nose: Nose normal.      Mouth/Throat:      Mouth: No oral lesions.      Pharynx: No oropharyngeal exudate or posterior oropharyngeal erythema.   Eyes:      General: Lids are normal. No scleral icterus.     Extraocular Movements: Extraocular movements intact.      Conjunctiva/sclera: Conjunctivae normal.      Pupils: Pupils are equal, round, and reactive to light.   Neck:      Thyroid: No thyroid mass or thyromegaly.      Vascular: No carotid bruit.   Cardiovascular:      Rate and Rhythm: Normal rate and regular rhythm. No extrasystoles are present.     Chest Wall: PMI is not displaced.      Heart sounds: Normal heart sounds. No murmur heard.     No gallop.   Pulmonary:      Effort: Pulmonary effort is normal. No accessory muscle usage or respiratory distress.      Breath sounds: Normal breath sounds.   Abdominal:      General: Bowel sounds are normal. There is no abdominal bruit.      Palpations: Abdomen is soft.      Tenderness: There is no abdominal tenderness. There is no rebound.   Musculoskeletal:      Cervical back: Normal range of motion and neck supple.   Lymphadenopathy:      Head:      Right side of head: No submental or submandibular adenopathy.      Left side of head: No submental or submandibular adenopathy.      Cervical:      Right cervical: No superficial, deep or posterior cervical adenopathy.     Left cervical: No superficial, deep or posterior cervical adenopathy.      Upper Body:      Right upper body: No supraclavicular adenopathy.      Left upper body: No supraclavicular adenopathy.   Skin:     General: Skin is warm and dry.   Neurological:      Mental Status: He is alert and oriented to person, place, and time.       Blood pressure 138/78, pulse 63, temperature 98.4 °F (36.9 °C), resp. rate 18, height 5' 5" (1.651 m), weight 74.7 kg (164 lb 12.7 oz), SpO2 98 %.Body mass index is 27.42 kg/m².            A/P:  1)  " Diabetes.  Well controlled and possibly over controlled.  We are going to stop the Amaryl.  He will continue with the Glucophage.  He will check his fasting blood sugars a couple of times a week and let me know if his readings are consistently higher than 140.  If he has any hypoglycemic symptoms again, he will let me know  2) hypertension.  Well controlled.  Continue with Toprol and Avapro   3)  Hyperlipidemia.  Well controlled.  Continue with Lipitor.  We will check labs again in 1 year   4)  PAF.  Asymptomatic.  Follow up with Cardiology and continue flecainide under their direction  5)  RLS.   Well controlled.  Continue with Requip  6) kidney stones associated with hyperuricemia.  Asymptomatic.  We will check a uric acid level.  Continue with allopurinol.      As long as he does well and is fasting sugars are consistently less than 140, I will see him back in 6 months with labs prior to that visit.

## 2023-10-27 NOTE — TELEPHONE ENCOUNTER
Done but due for labs and next avail fu    Try to rachel my labs before Maame's appt      Ok to rachel with me in April   Lower prograf to 6 mg PO bid if the level is a true trough level and repeat labs next week

## 2023-12-08 RX ORDER — METOPROLOL SUCCINATE 25 MG/1
TABLET, EXTENDED RELEASE ORAL
Qty: 180 TABLET | Refills: 4 | Status: SHIPPED | OUTPATIENT
Start: 2023-12-08

## 2024-01-09 LAB
LEFT EYE DM RETINOPATHY: NEGATIVE
RIGHT EYE DM RETINOPATHY: NEGATIVE

## 2024-01-11 DIAGNOSIS — Z00.00 ENCOUNTER FOR MEDICARE ANNUAL WELLNESS EXAM: ICD-10-CM

## 2024-01-12 ENCOUNTER — PATIENT OUTREACH (OUTPATIENT)
Dept: ADMINISTRATIVE | Facility: HOSPITAL | Age: 69
End: 2024-01-12
Payer: MEDICARE

## 2024-01-12 NOTE — PROGRESS NOTES
Population Health Chart Review & Patient Outreach Details    Outreach Performed: NO    Additional Pop Health Notes:           Updates Requested / Reviewed:      Updated Care Coordination Note and Care Team Updated         Health Maintenance Topics Overdue:    Health Maintenance Due   Topic Date Due    RSV Vaccine (Age 60+ and Pregnant patients) (1 - 1-dose 60+ series) Never done    COVID-19 Vaccine (5 - 2023-24 season) 09/01/2023         Health Maintenance Topic(s) Outreach Outcomes & Actions Taken:    Eye Exam - Outreach Outcomes & Actions Taken  : Diabetic Eye External Records Uploaded, Care Team & History Updated if Applicable

## 2024-02-14 NOTE — TELEPHONE ENCOUNTER
Care Due:                  Date            Visit Type   Department     Provider  --------------------------------------------------------------------------------                                EP -                              PRIMARY      [facility]    Blanca Atkinsonyosef  Last Visit: 10-      CARE ([facility])   MEDICINE       Anger                              EP -                              PRIMARY      [facility]    Blanca Brownlee Nader  Next Visit: 04-      CARE ([facility])   MEDICINE       Anger                                                            Last  Test          Frequency    Reason                     Performed    Due Date  --------------------------------------------------------------------------------    HBA1C.......  6 months...  metFORMIN................  10-   04-    [facility] Embedded Care Due Messages. Reference number: [id number].   2/13/2024 11:36:11 PM CST

## 2024-02-15 RX ORDER — SILDENAFIL 100 MG/1
100 TABLET, FILM COATED ORAL DAILY PRN
Qty: 10 TABLET | Refills: 8 | Status: SHIPPED | OUTPATIENT
Start: 2024-02-15

## 2024-02-15 NOTE — TELEPHONE ENCOUNTER
Refill Routing Note   Medication(s) are not appropriate for processing by [facility] for the following reason(s):        Drug-disease interaction    [facility] action(s):  Defer        [facility]    Pharmacist review requested: Yes     Appointments  past 12m or future 3m with PCP    Date Provider   Last Visit   10/23/2023 Blanca Doe MD   Next Visit   4/29/2024 Blanca Doe MD   ED visits in past 90 days: 0        Note composed:10:38 AM 02/15/2024

## 2024-02-15 NOTE — TELEPHONE ENCOUNTER
Refill Decision Note   Trevor Johntson  is requesting a refill authorization.  Brief Assessment and Rationale for Refill:  Approve     Medication Therapy Plan: FLOS      Pharmacist review requested: Yes   Extended chart review required: Yes   Comments:     Note composed:11:44 AM 02/15/2024

## 2024-04-23 ENCOUNTER — LAB VISIT (OUTPATIENT)
Dept: LAB | Facility: HOSPITAL | Age: 69
End: 2024-04-23
Attending: FAMILY MEDICINE
Payer: MEDICARE

## 2024-04-23 DIAGNOSIS — E11.8 DIABETES MELLITUS TYPE 2 WITH COMPLICATIONS: ICD-10-CM

## 2024-04-23 DIAGNOSIS — Z12.5 ENCOUNTER FOR SCREENING FOR MALIGNANT NEOPLASM OF PROSTATE: ICD-10-CM

## 2024-04-23 DIAGNOSIS — E79.0 HYPERURICEMIA: ICD-10-CM

## 2024-04-23 LAB
ALBUMIN SERPL BCP-MCNC: 4.1 G/DL (ref 3.5–5.2)
ALP SERPL-CCNC: 87 U/L (ref 55–135)
ALT SERPL W/O P-5'-P-CCNC: 22 U/L (ref 10–44)
ANION GAP SERPL CALC-SCNC: 9 MMOL/L (ref 8–16)
AST SERPL-CCNC: 19 U/L (ref 10–40)
BASOPHILS # BLD AUTO: 0.07 K/UL (ref 0–0.2)
BASOPHILS NFR BLD: 0.9 % (ref 0–1.9)
BILIRUB SERPL-MCNC: 0.5 MG/DL (ref 0.1–1)
BUN SERPL-MCNC: 15 MG/DL (ref 8–23)
CALCIUM SERPL-MCNC: 9.9 MG/DL (ref 8.7–10.5)
CHLORIDE SERPL-SCNC: 106 MMOL/L (ref 95–110)
CO2 SERPL-SCNC: 26 MMOL/L (ref 23–29)
COMPLEXED PSA SERPL-MCNC: 7.4 NG/ML (ref 0–4)
CREAT SERPL-MCNC: 0.9 MG/DL (ref 0.5–1.4)
DIFFERENTIAL METHOD BLD: NORMAL
EOSINOPHIL # BLD AUTO: 0.1 K/UL (ref 0–0.5)
EOSINOPHIL NFR BLD: 1.7 % (ref 0–8)
ERYTHROCYTE [DISTWIDTH] IN BLOOD BY AUTOMATED COUNT: 12.7 % (ref 11.5–14.5)
EST. GFR  (NO RACE VARIABLE): >60 ML/MIN/1.73 M^2
ESTIMATED AVG GLUCOSE: 148 MG/DL (ref 68–131)
GLUCOSE SERPL-MCNC: 128 MG/DL (ref 70–110)
HBA1C MFR BLD: 6.8 % (ref 4–5.6)
HCT VFR BLD AUTO: 44.3 % (ref 40–54)
HGB BLD-MCNC: 14.5 G/DL (ref 14–18)
IMM GRANULOCYTES # BLD AUTO: 0.02 K/UL (ref 0–0.04)
IMM GRANULOCYTES NFR BLD AUTO: 0.2 % (ref 0–0.5)
LYMPHOCYTES # BLD AUTO: 2.5 K/UL (ref 1–4.8)
LYMPHOCYTES NFR BLD: 30.4 % (ref 18–48)
MCH RBC QN AUTO: 30.7 PG (ref 27–31)
MCHC RBC AUTO-ENTMCNC: 32.7 G/DL (ref 32–36)
MCV RBC AUTO: 94 FL (ref 82–98)
MONOCYTES # BLD AUTO: 0.7 K/UL (ref 0.3–1)
MONOCYTES NFR BLD: 8.6 % (ref 4–15)
NEUTROPHILS # BLD AUTO: 4.8 K/UL (ref 1.8–7.7)
NEUTROPHILS NFR BLD: 58.2 % (ref 38–73)
NRBC BLD-RTO: 0 /100 WBC
PLATELET # BLD AUTO: 282 K/UL (ref 150–450)
PMV BLD AUTO: 10.2 FL (ref 9.2–12.9)
POTASSIUM SERPL-SCNC: 4.4 MMOL/L (ref 3.5–5.1)
PROT SERPL-MCNC: 7.6 G/DL (ref 6–8.4)
RBC # BLD AUTO: 4.73 M/UL (ref 4.6–6.2)
SODIUM SERPL-SCNC: 141 MMOL/L (ref 136–145)
URATE SERPL-MCNC: 4 MG/DL (ref 3.4–7)
WBC # BLD AUTO: 8.22 K/UL (ref 3.9–12.7)

## 2024-04-23 PROCEDURE — 36415 COLL VENOUS BLD VENIPUNCTURE: CPT | Mod: PO | Performed by: FAMILY MEDICINE

## 2024-04-23 PROCEDURE — 83036 HEMOGLOBIN GLYCOSYLATED A1C: CPT | Performed by: FAMILY MEDICINE

## 2024-04-23 PROCEDURE — 80053 COMPREHEN METABOLIC PANEL: CPT | Performed by: FAMILY MEDICINE

## 2024-04-23 PROCEDURE — 84153 ASSAY OF PSA TOTAL: CPT | Mod: GA | Performed by: FAMILY MEDICINE

## 2024-04-23 PROCEDURE — 84550 ASSAY OF BLOOD/URIC ACID: CPT | Performed by: FAMILY MEDICINE

## 2024-04-23 PROCEDURE — 85025 COMPLETE CBC W/AUTO DIFF WBC: CPT | Performed by: FAMILY MEDICINE

## 2024-04-28 ENCOUNTER — PATIENT MESSAGE (OUTPATIENT)
Dept: FAMILY MEDICINE | Facility: CLINIC | Age: 69
End: 2024-04-28
Payer: MEDICARE

## 2024-04-29 ENCOUNTER — OFFICE VISIT (OUTPATIENT)
Dept: FAMILY MEDICINE | Facility: CLINIC | Age: 69
End: 2024-04-29
Payer: MEDICARE

## 2024-04-29 ENCOUNTER — TELEPHONE (OUTPATIENT)
Dept: FAMILY MEDICINE | Facility: CLINIC | Age: 69
End: 2024-04-29

## 2024-04-29 VITALS
BODY MASS INDEX: 28.28 KG/M2 | HEART RATE: 56 BPM | DIASTOLIC BLOOD PRESSURE: 68 MMHG | SYSTOLIC BLOOD PRESSURE: 110 MMHG | RESPIRATION RATE: 16 BRPM | TEMPERATURE: 98 F | HEIGHT: 65 IN | WEIGHT: 169.75 LBS | OXYGEN SATURATION: 98 %

## 2024-04-29 DIAGNOSIS — M79.673 PAIN OF FOOT, UNSPECIFIED LATERALITY: ICD-10-CM

## 2024-04-29 DIAGNOSIS — I70.0 AORTIC ATHEROSCLEROSIS: ICD-10-CM

## 2024-04-29 DIAGNOSIS — E11.69 HYPERLIPIDEMIA ASSOCIATED WITH TYPE 2 DIABETES MELLITUS: ICD-10-CM

## 2024-04-29 DIAGNOSIS — E11.59 HYPERTENSION ASSOCIATED WITH DIABETES: ICD-10-CM

## 2024-04-29 DIAGNOSIS — I15.2 HYPERTENSION ASSOCIATED WITH DIABETES: ICD-10-CM

## 2024-04-29 DIAGNOSIS — E11.8 DIABETES MELLITUS TYPE 2 WITH COMPLICATIONS: Primary | ICD-10-CM

## 2024-04-29 DIAGNOSIS — E78.5 HYPERLIPIDEMIA ASSOCIATED WITH TYPE 2 DIABETES MELLITUS: ICD-10-CM

## 2024-04-29 LAB
ALBUMIN/CREAT UR: 6.9 UG/MG (ref 0–30)
CREAT UR-MCNC: 145 MG/DL (ref 23–375)
MICROALBUMIN UR DL<=1MG/L-MCNC: 10 UG/ML

## 2024-04-29 PROCEDURE — 99214 OFFICE O/P EST MOD 30 MIN: CPT | Mod: S$GLB,,, | Performed by: FAMILY MEDICINE

## 2024-04-29 PROCEDURE — 82043 UR ALBUMIN QUANTITATIVE: CPT | Performed by: FAMILY MEDICINE

## 2024-04-29 NOTE — PROGRESS NOTES
Subjective:       Patient ID: Trevor Johnston is a 68 y.o. male.    Chief Complaint: Follow-up (6 month follow up )    HPI  The patient is a 68-year-old who is here today for follow-up.  He recently had labs which reviewed.  Overall, he is doing well.      Today we discussed the followin)  Diabetes.  His recent A1c was 6.8 from prior value of 6.2.  He does not check his sugars at home.  He has been taking his Glucophage  mg in the morning and 1000 mg in the evening.  He can not tolerate any higher dose of the Glucophage  2) hypertension.  Today's blood pressure is 110/68.  He is taking Toprol which he is doing well with  3)  PAF.  He is following with Cardiology and continues to have his loop device in place.  He is no longer taking the flecainide but is taking the Xarelto.  He has not felt any palpitations   4)  RLS.  His restless legs have improved.  He has no longer taking the Requip  5) diabetic neuropathy.  He has no longer having the tingling sensation in his feet.  He is going to see if he needs to continue with gabapentin or not    6) elevated PSA.  He does see Dr. Connelly.  We did discuss his increased PSA value of 7.4 and he will be seeing him soon.    Review of systems is remarkable for pain in his right foot which he localizes to the right MTP joint.  He has had no injury to this area      Review of Systems   Constitutional:  Negative for appetite change, chills, diaphoresis, fatigue, fever and unexpected weight change.   HENT:  Negative for congestion, ear pain, postnasal drip, rhinorrhea, sinus pressure, sneezing, sore throat and trouble swallowing.    Eyes:  Negative for pain, discharge and visual disturbance.   Respiratory:  Negative for cough, chest tightness, shortness of breath and wheezing.    Cardiovascular:  Negative for chest pain, palpitations and leg swelling.   Gastrointestinal:  Negative for abdominal distention, abdominal pain, blood in stool, constipation, diarrhea, nausea  and vomiting.   Musculoskeletal:         Per HPI   Skin:  Negative for rash.         Objective:      Physical Exam  Constitutional:       General: He is not in acute distress.     Appearance: Normal appearance. He is well-developed.   HENT:      Head: Normocephalic and atraumatic.      Right Ear: Hearing, tympanic membrane, ear canal and external ear normal.      Left Ear: Hearing, tympanic membrane, ear canal and external ear normal.      Nose: Nose normal.      Mouth/Throat:      Mouth: No oral lesions.      Pharynx: No oropharyngeal exudate or posterior oropharyngeal erythema.   Eyes:      General: Lids are normal. No scleral icterus.     Extraocular Movements: Extraocular movements intact.      Conjunctiva/sclera: Conjunctivae normal.      Pupils: Pupils are equal, round, and reactive to light.   Neck:      Thyroid: No thyroid mass or thyromegaly.      Vascular: No carotid bruit.   Cardiovascular:      Rate and Rhythm: Normal rate and regular rhythm. No extrasystoles are present.     Chest Wall: PMI is not displaced.      Pulses:           Dorsalis pedis pulses are 2+ on the right side and 2+ on the left side.        Posterior tibial pulses are 2+ on the right side and 2+ on the left side.      Heart sounds: Normal heart sounds. No murmur heard.     No gallop.   Pulmonary:      Effort: Pulmonary effort is normal. No accessory muscle usage or respiratory distress.      Breath sounds: Normal breath sounds.   Abdominal:      General: Bowel sounds are normal. There is no abdominal bruit.      Palpations: Abdomen is soft.      Tenderness: There is no abdominal tenderness. There is no rebound.   Musculoskeletal:      Cervical back: Normal range of motion and neck supple.      Right foot: No deformity.      Left foot: No deformity.   Feet:      Right foot:      Protective Sensation: 10 sites tested.  10 sites sensed.      Skin integrity: Warmth present. No ulcer or callus.      Left foot:      Protective Sensation: 10  "sites tested.  10 sites sensed.      Skin integrity: Warmth present. No ulcer or callus.   Lymphadenopathy:      Head:      Right side of head: No submental or submandibular adenopathy.      Left side of head: No submental or submandibular adenopathy.      Cervical:      Right cervical: No superficial, deep or posterior cervical adenopathy.     Left cervical: No superficial, deep or posterior cervical adenopathy.      Upper Body:      Right upper body: No supraclavicular adenopathy.      Left upper body: No supraclavicular adenopathy.   Skin:     General: Skin is warm and dry.   Neurological:      Mental Status: He is alert and oriented to person, place, and time.       Blood pressure 110/68, pulse (!) 56, temperature 98.2 °F (36.8 °C), resp. rate 16, height 5' 5" (1.651 m), weight 77 kg (169 lb 12.1 oz), SpO2 98%.Body mass index is 28.25 kg/m².              A/P:  1)  Diabetes.  Fairly well controlled with recent increase.  He will continue with Glucophage XR.  We are going to start Jardiance 10 mg once a day.  We will check an A1c in 6 months.  We will collect a urine microalbumin today.    2) hypertension. Well controlled.  Continue with Toprol  3)  PAF.  Asymptomatic.  Follow up with Cardiology and continue with Xarelto under their direction    4)  RLS.  Resolved.  We will officially discontinue the Requip.  If this recurs, he will let me know  5) diabetic neuropathy.  Asymptomatic.  He will try to do without the gabapentin.  If his symptoms come back without the gabapentin, he will resume the gabapentin.  For now we will keep it as an active medicine on his med list  6) elevated PSA.  Recent worsening.  Follow up with Urology soon to address increased PSA.  We did send a copy of this PSA to Dr. Connelly  7) right foot pain at right MTP joint.  New.  We will refer him to Podiatry  8) hyperlipidemia.  Continue with lipitor.  We will check a fasting lipid profile prior to his next visit  9) aortic atherosclerosis.  " Asymptomatic      As long as he does well, I will see him back in 6 months with labs prior or sooner if needed

## 2024-04-29 NOTE — TELEPHONE ENCOUNTER
----- Message from Charlotte Morales sent at 4/29/2024 11:38 AM CDT -----  Pt just left his ov with Dr. Doe.  States they discussed a referral for Podiatry but left without any information on which provider to see.  Pls call with recommendation.  If no answer, pls leave detail message on which doctor and location.

## 2024-04-30 ENCOUNTER — HOSPITAL ENCOUNTER (OUTPATIENT)
Dept: RADIOLOGY | Facility: HOSPITAL | Age: 69
Discharge: HOME | End: 2024-04-30
Attending: PODIATRIST
Payer: MEDICARE

## 2024-04-30 ENCOUNTER — OFFICE VISIT (OUTPATIENT)
Dept: PODIATRY | Facility: CLINIC | Age: 69
End: 2024-04-30
Payer: MEDICARE

## 2024-04-30 VITALS — HEIGHT: 65 IN | WEIGHT: 169.75 LBS | BODY MASS INDEX: 28.28 KG/M2

## 2024-04-30 DIAGNOSIS — M21.611 BUNION, RIGHT: ICD-10-CM

## 2024-04-30 DIAGNOSIS — M79.671 RIGHT FOOT PAIN: ICD-10-CM

## 2024-04-30 DIAGNOSIS — M21.611 BUNION, RIGHT: Primary | ICD-10-CM

## 2024-04-30 PROCEDURE — 73630 X-RAY EXAM OF FOOT: CPT | Mod: TC,PO,RT

## 2024-04-30 PROCEDURE — 99213 OFFICE O/P EST LOW 20 MIN: CPT | Mod: PBBFAC,PO,25 | Performed by: PODIATRIST

## 2024-04-30 PROCEDURE — 99999 PR PBB SHADOW E&M-EST. PATIENT-LVL III: CPT | Mod: PBBFAC,,, | Performed by: PODIATRIST

## 2024-04-30 PROCEDURE — 73630 X-RAY EXAM OF FOOT: CPT | Mod: 26,RT,, | Performed by: RADIOLOGY

## 2024-04-30 PROCEDURE — 99203 OFFICE O/P NEW LOW 30 MIN: CPT | Mod: S$PBB,,, | Performed by: PODIATRIST

## 2024-04-30 NOTE — PATIENT INSTRUCTIONS
Shoes: Altra    Try stretching out the toe box of existing shoes.  Gravitate more toward shoes with a stiffer midsole.      Recommend icing the joint up to 20 minutes daily.    Try applying voltaren cream/gel to the joint 2-4 times daily.

## 2024-04-30 NOTE — PROGRESS NOTES
Subjective:     Patient ID: Trevor Johnston is a 68 y.o. male.    Chief Complaint: Foot Pain (Rt foot bunion/ball of foot area  for several months)    Trevor is a 68 y.o. male with a past medical history of Anticoagulant long-term use, Arthritis, ASD (atrial septal defect), Diabetes mellitus, type 2, Fatty liver disease, nonalcoholic, H/O cardiovascular stress test, Hyperlipidemia, Hypertension, Kidney stones, Macular degeneration, right eye, ROBERT (obstructive sleep apnea), PAF (paroxysmal atrial fibrillation), PFO with atrial septal aneurysm, RLS (restless legs syndrome), and Valvular heart disease. The patient's chief complaint consists of Rt. 1st mtp joint pain that has been present for over 6 months.  Describes pain more as achy and a nuisance, that's exacerbated with weight bearing in closed toe shoes.  Symptoms are alleviated with rest.  Denies sustaining trauma to the site.  Denies this impeding his activities of daily living.  Inquires as to how this should be managed going forward.  Denies any additional pedal complaints.      Past Medical History:   Diagnosis Date    Anticoagulant long-term use     Arthritis     ASD (atrial septal defect)     noted on 11/20 ECHO    Diabetes mellitus, type 2     Fatty liver disease, nonalcoholic     noted on CT    H/O cardiovascular stress test     normal 6/20    Hyperlipidemia     Hypertension     Kidney stones     multiple episodes - on allopurinol    Macular degeneration, right eye     ROBERT (obstructive sleep apnea)     does not use CPAP    PAF (paroxysmal atrial fibrillation)     s/p cardioversion 11/20- pt denies    PFO with atrial septal aneurysm     RLS (restless legs syndrome)     Valvular heart disease     mod MR and mild TR on 6/20 echo       Past Surgical History:   Procedure Laterality Date    CATARACT EXTRACTION W/  INTRAOCULAR LENS IMPLANT Right 02/2017    COLONOSCOPY      COLONOSCOPY N/A 9/30/2021    Procedure: COLONOSCOPY;  Surgeon: Sb Mcgraw MD;   Location: [facility] ENDO;  Service: Endoscopy;  Laterality: N/A;    CYSTOURETEROSCOPY WITH RETROGRADE PYELOGRAPHY AND INSERTION OF STENT INTO URETER Left 3/5/2020    Procedure: CYSTOURETEROSCOPY, WITH RETROGRADE PYELOGRAM AND URETERAL STENT EXCHANGE, flexible ureterscope;  Surgeon: Sb Connelly MD;  Location: [facility] OR;  Service: Urology;  Laterality: Left;    EXTRACORPOREAL SHOCK WAVE LITHOTRIPSY Left 7/15/2020    Procedure: LITHOTRIPSY, ESWL;  Surgeon: Sb Connelly MD;  Location: [facility] CSC;  Service: Urology;  Laterality: Left;    EXTRACORPOREAL SHOCK WAVE LITHOTRIPSY N/A 2020    Procedure: LITHOTRIPSY, ESWL;  Surgeon: Sb Connelly MD;  Location: [facility] OR;  Service: Urology;  Laterality: N/A;    EXTRACORPOREAL SHOCK WAVE LITHOTRIPSY Left 2022    Procedure: LITHOTRIPSY, ESWL;  Surgeon: Sb Connelly MD;  Location: [facility] OR;  Service: Urology;  Laterality: Left;    INSERTION OF IMPLANTABLE LOOP RECORDER N/A 4/15/2022    Procedure: Insertion, Implantable Loop Recorder;  Surgeon: Rodrigo Isabel MD;  Location: [facility] CATH;  Service: Cardiology;  Laterality: N/A;    LASER LITHOTRIPSY Left 3/5/2020    Procedure: LITHOTRIPSY, USING LASER;  Surgeon: Sb Connelly MD;  Location: [facility] OR;  Service: Urology;  Laterality: Left;    ROTATOR CUFF REPAIR Right     TRIGGER FINGER RELEASE Bilateral     VASECTOMY         Family History   Problem Relation Name Age of Onset    Diabetes Mother      Diabetes Father      Cancer Maternal Grandfather      Throat cancer Maternal Grandfather         Social History     Socioeconomic History    Marital status: Single   Tobacco Use    Smoking status: Some Days     Current packs/day: 0.00     Types: Cigarettes     Start date: 1972     Last attempt to quit: 2017     Years since quittin.3    Smokeless tobacco: Never    Tobacco comments:     45 year, about 1PPW, quit 2017   Substance and Sexual Activity    Alcohol use: Not Currently    Drug use: No    Sexual  activity: Yes     Partners: Female     Birth control/protection: See Surgical Hx   Social History Narrative    9/20/18: he is retired. He used to work as a  for 10 years and also an . The last 10 years he was an  for [employer]. He lives with his wife.He has two kids; they live nearby.      Social Determinants of Health     Financial Resource Strain: Low Risk  (2/13/2024)    Overall Financial Resource Strain (CARDIA)     Difficulty of Paying Living Expenses: Not hard at all   Food Insecurity: No Food Insecurity (2/13/2024)    Hunger Vital Sign     Worried About Running Out of Food in the Last Year: Never true     Ran Out of Food in the Last Year: Never true   Transportation Needs: No Transportation Needs (2/13/2024)    PRAPARE - Transportation     Lack of Transportation (Medical): No     Lack of Transportation (Non-Medical): No   Physical Activity: Inactive (2/13/2024)    Exercise Vital Sign     Days of Exercise per Week: 0 days     Minutes of Exercise per Session: 0 min   Stress: No Stress Concern Present (2/13/2024)    [place] [employer] - Occupational Stress Questionnaire     Feeling of Stress : Only a little   Social Connections: Unknown (2/13/2024)    Social Connection and Isolation Panel [NHANES]     Frequency of Communication with Friends and Family: More than three times a week     Frequency of Social Gatherings with Friends and Family: Twice a week     Active Member of Clubs or Organizations: No     Attends Club or Organization Meetings: Never     Marital Status: Living with partner   Housing Stability: Low Risk  (2/13/2024)    Housing Stability Vital Sign     Unable to Pay for Housing in the Last Year: No     Number of Places Lived in the Last Year: 1     Unstable Housing in the Last Year: No       Current Outpatient Medications   Medication Sig Dispense Refill    allopurinoL (ZYLOPRIM) 300 MG tablet TAKE 1 TABLET DAILY 30 tablet 1    atorvastatin (LIPITOR) 40 MG  tablet TAKE 1 TABLET DAILY 90 tablet 1    blood sugar diagnostic Strp       blood sugar diagnostic Strp To check BG 1 times daily, to use with insurance preferred meter 200 each 1    brimonidine 0.2% (ALPHAGAN) 0.2 % Drop Place 1 drop into the right eye 2 (two) times a day.       dorzolamide-timolol 2-0.5% (COSOPT) 22.3-6.8 mg/mL ophthalmic solution Place 1 drop into the right eye 2 (two) times daily.       empagliflozin (JARDIANCE) 10 mg tablet Take 1 tablet (10 mg total) by mouth once daily. 90 tablet 1    gabapentin (NEURONTIN) 300 MG capsule TAKE 1 CAPSULE EVERY EVENING 90 capsule 1    irbesartan (AVAPRO) 75 MG tablet TAKE 1 TABLET EVERY EVENING 90 tablet 3    LUMIGAN 0.01 % Drop Place 1 drop into both eyes every evening.      metFORMIN (GLUCOPHAGE-XR) 500 MG ER 24hr tablet Take 1 tablet (500 mg total) by mouth daily with breakfast AND 2 tablets (1,000 mg total) every evening. 90 tablet 1    metoprolol succinate (TOPROL-XL) 25 MG 24 hr tablet TAKE 1 TABLET TWICE A  tablet 4    sildenafiL (VIAGRA) 100 MG tablet Take 1 tablet (100 mg total) by mouth daily as needed for Erectile Dysfunction. 10 tablet 8    ULTRA THIN LANCETS 30 gauge Misc USE TO CHECK BLOOD GLUCOSE ONCE DAILY      XARELTO 20 mg Tab TAKE 1 TABLET EVERY EVENING 90 tablet 3     No current facility-administered medications for this visit.     Facility-Administered Medications Ordered in Other Visits   Medication Dose Route Frequency Provider Last Rate Last Admin    ciprofloxacin (CIPRO)400mg/200ml D5W IVPB 400 mg  400 mg Intravenous On Call Procedure Sb Connelly MD   400 mg at 12/29/22 1115    diphenhydrAMINE injection 25 mg  25 mg Intravenous Q6H PRN Ayan Stephens MD        HYDROmorphone injection 0.5 mg  0.5 mg Intravenous Q5 Min PRN Ayan Stephens MD        lactated ringers infusion   Intravenous Continuous Tammy Marvin MD 10 mL/hr at 08/12/20 1150 New Bag at 08/12/20 1249    lactated ringers infusion    Intravenous Continuous Finesse De La Rosa MD   Stopped at 03/17/21 1035    lactated ringers infusion   Intravenous Continuous Finesse De La Rosa MD   Stopped at 12/29/22 1315    lidocaine (PF) 10 mg/ml (1%) injection 10 mg  1 mL Intradermal Once Tammy Marvin MD        LIDOcaine (PF) 10 mg/ml (1%) injection 10 mg  1 mL Other Once Finesse De La Rosa MD        ondansetron injection 4 mg  4 mg Intravenous Daily PRN Ayan Stephens MD        prochlorperazine injection Soln 5 mg  5 mg Intravenous Q30 Min PRN Ayan Stephens MD        sodium chloride 0.9% bolus 250 mL 250 mL  250 mL Intravenous Once Ayan Stephens MD           Review of patient's allergies indicates:  No Known Allergies     Hemoglobin A1C   Date Value Ref Range Status   04/23/2024 6.8 (H) 4.0 - 5.6 % Final     Comment:     ADA Screening Guidelines:  5.7-6.4%  Consistent with prediabetes  >or=6.5%  Consistent with diabetes    High levels of fetal hemoglobin interfere with the HbA1C  assay. Heterozygous hemoglobin variants (HbS, HgC, etc)do  not significantly interfere with this assay.   However, presence of multiple variants may affect accuracy.     10/16/2023 6.2 (H) 4.0 - 5.6 % Final     Comment:     ADA Screening Guidelines:  5.7-6.4%  Consistent with prediabetes  >or=6.5%  Consistent with diabetes    High levels of fetal hemoglobin interfere with the HbA1C  assay. Heterozygous hemoglobin variants (HbS, HgC, etc)do  not significantly interfere with this assay.   However, presence of multiple variants may affect accuracy.     03/13/2023 6.7 (H) 4.0 - 5.6 % Final     Comment:     ADA Screening Guidelines:  5.7-6.4%  Consistent with prediabetes  >or=6.5%  Consistent with diabetes    High levels of fetal hemoglobin interfere with the HbA1C  assay. Heterozygous hemoglobin variants (HbS, HgC, etc)do  not significantly interfere with this assay.   However, presence of multiple variants may affect accuracy.         Review of  Systems   Constitutional: Negative for chills and fever.   Skin:  Negative for color change and nail changes.   Musculoskeletal:  Positive for joint pain. Negative for muscle cramps and muscle weakness.   Gastrointestinal:  Negative for nausea and vomiting.   Neurological:  Negative for numbness and paresthesias.   Psychiatric/Behavioral:  Negative for altered mental status.         Objective:     Physical Exam  Constitutional:       Appearance: Normal appearance. He is not ill-appearing.   Cardiovascular:      Pulses:           Dorsalis pedis pulses are 2+ on the right side and 2+ on the left side.        Posterior tibial pulses are 2+ on the right side and 2+ on the left side.      Comments: CFT is < 3 seconds bilateral.  Pedal hair growth is present bilateral.  No lower extremity edema noted bilateral.  Toes are warm to touch bilateral.    Musculoskeletal:         General: Tenderness and deformity present.      Comments: Muscle strength 5/5 in all muscle groups bilateral. Pain with palpation to the medial eminence of the Rt. 1st mtp joint.  Dorsal spurring noted to the Rt. 1st mtp joint.  Crepitation noted with ROM of the Rt. 1st mtp joint.  Mild Rt. HAV.   Skin:     General: Skin is warm and dry.      Capillary Refill: Capillary refill takes 2 to 3 seconds.      Findings: No bruising, ecchymosis, erythema, signs of injury, laceration, lesion, petechiae, rash or wound.      Comments: Pedal skin has normal turgor, temperature, and texture bilateral.  Toenails x 10 appear normotrophic. Examination of the skin reveals no evidence of significant maceration, rashes, open lesions, suspicious appearing nevi or other concerning lesions.       Neurological:      General: No focal deficit present.      Mental Status: He is alert.      Sensory: No sensory deficit.      Motor: No weakness or atrophy.      Comments: Light touch is intact bilateral.             Assessment:      Encounter Diagnoses   Name Primary?    Right foot  pain     Bunion, right Yes     Plan:     Trevor was seen today for foot pain.    Diagnoses and all orders for this visit:    Bunion, right  -     X-Ray Foot Complete Right; Future    Right foot pain  -     Ambulatory referral/consult to Podiatry  -     X-Ray Foot Complete Right; Future      I counseled the patient on his conditions, their implications and medical management.    Orders written for an x-ray of the Rt. Foot.    Based on today's exam, patient's pain symptoms are secondary to a bunion deformity with overlying arthritis to the dorsal 1st mtp joint.    Recommend palliative care, as pain symptoms are more of a nuisance.    Recommend icing the affected joint up to 20 minutes daily.    Patient may apply voltaren cream/gel to the site 2-4 times daily.    To continue wearing supportive shoe gear.  May consider use of shoe stretcher for the toe box.    Briefly discussed an intra-articular injection vs surgery should symptoms become unmanageable.      To continue with normal weight bearing activity to tolerance.    RTC prn.    Sb Brantley DPM

## 2024-05-01 ENCOUNTER — TELEPHONE (OUTPATIENT)
Dept: PODIATRY | Facility: CLINIC | Age: 69
End: 2024-05-01
Payer: MEDICARE

## 2024-05-01 NOTE — TELEPHONE ENCOUNTER
----- Message from Sb Brantley DPM sent at 5/1/2024  5:11 AM CDT -----  Please let the patient know x-rays were as we discussed in clinic.  He does have a bunion and moderate arthritis of the joint.  We will only treat further should symptoms worsen.  ----- Message -----  From: Interface, Rad Results In  Sent: 4/30/2024  11:38 AM CDT  To: Sb Brantley DPM

## 2024-05-25 ENCOUNTER — PATIENT MESSAGE (OUTPATIENT)
Dept: FAMILY MEDICINE | Facility: CLINIC | Age: 69
End: 2024-05-25
Payer: MEDICARE

## 2024-05-25 DIAGNOSIS — E11.69 TYPE 2 DIABETES MELLITUS WITH OTHER SPECIFIED COMPLICATION, WITHOUT LONG-TERM CURRENT USE OF INSULIN: ICD-10-CM

## 2024-05-25 DIAGNOSIS — N20.0 KIDNEY STONES: ICD-10-CM

## 2024-05-26 RX ORDER — METFORMIN HYDROCHLORIDE 500 MG/1
TABLET, EXTENDED RELEASE ORAL
Qty: 180 TABLET | Refills: 1 | Status: SHIPPED | OUTPATIENT
Start: 2024-05-26

## 2024-05-26 RX ORDER — ALLOPURINOL 300 MG/1
300 TABLET ORAL DAILY
Qty: 90 TABLET | Refills: 1 | Status: SHIPPED | OUTPATIENT
Start: 2024-05-26

## 2024-05-26 NOTE — TELEPHONE ENCOUNTER
No care due was identified.  [facility] Embedded Care Due Messages. Reference number: [id number].   5/26/2024 9:22:46 AM CDT

## 2024-06-08 ENCOUNTER — PATIENT MESSAGE (OUTPATIENT)
Dept: FAMILY MEDICINE | Facility: CLINIC | Age: 69
End: 2024-06-08
Payer: MEDICARE

## 2024-06-16 ENCOUNTER — OFFICE VISIT (OUTPATIENT)
Dept: URGENT CARE | Facility: CLINIC | Age: 69
End: 2024-06-16
Payer: MEDICARE

## 2024-06-16 VITALS
BODY MASS INDEX: 28.16 KG/M2 | DIASTOLIC BLOOD PRESSURE: 81 MMHG | OXYGEN SATURATION: 97 % | HEIGHT: 65 IN | RESPIRATION RATE: 15 BRPM | WEIGHT: 169 LBS | SYSTOLIC BLOOD PRESSURE: 134 MMHG | HEART RATE: 61 BPM | TEMPERATURE: 98 F

## 2024-06-16 DIAGNOSIS — M54.50 RIGHT-SIDED LOW BACK PAIN WITHOUT SCIATICA, UNSPECIFIED CHRONICITY: Primary | ICD-10-CM

## 2024-06-16 PROCEDURE — 99213 OFFICE O/P EST LOW 20 MIN: CPT | Mod: S$GLB,,, | Performed by: PHYSICIAN ASSISTANT

## 2024-06-16 RX ORDER — PREDNISONE 20 MG/1
20 TABLET ORAL DAILY
Qty: 7 TABLET | Refills: 0 | Status: SHIPPED | OUTPATIENT
Start: 2024-06-16

## 2024-06-16 RX ORDER — TIZANIDINE 4 MG/1
4 TABLET ORAL EVERY 6 HOURS PRN
Qty: 30 TABLET | Refills: 0 | Status: SHIPPED | OUTPATIENT
Start: 2024-06-16

## 2024-06-16 NOTE — PATIENT INSTRUCTIONS
If you were prescribed a narcotic or controlled medication, do not drive or operate heavy equipment or machinery while taking these medications.  You must understand that you've received an Urgent Care treatment only and that you may be released before all your medical problems are known or treated. You, the patient, will arrange for follow up care as instructed.  Follow up with your PCP or specialty clinic as directed if not improved or as needed. You can call [phone] to schedule an appointment with the appropriate provider.  If your condition worsens we recommend that you receive another evaluation at the Emergency Department for any concerns or worsening of condition.  Patient aware and verbalized understanding.    Recommended no heavy lifting until symptoms resolve.  Prednisone RX as prescribed for inflammation/swelling - monitor sugars at home.  Zanaflex RX (muscle relaxer) - will make you drowsy, so please do not drive or operate heavy equipment or machinery while taking this medication.  You may do gently stretching if tolerable.  Moist warm compresses to area several times daily.   Advised patient to use a Heating pad at home on LOW and Warm baths with OTC Epsom Salt as discussed - MAKE SURE YOU DO NOT FALL ASLEEP WITH a HEATING PAD ON.  Do not stay in one position too long. When sleeping on your back, place a pillow under the knees to reduce tension on back. If sleeping on your side, place a pillow between the knees to keep spine in better alignment.   Wear supportive shoes such as tennis shoes for support of the neck and lower back during the day.  Follow-up with PCP for further evaluation as needed.  Follow-up with Ortho for further evaluation if still experiencing pain as discussed.  If you lose control of any extremity, your bowel and/or bladder, in between your legs by your genitalia and/or rectum, please go to the nearest Emergency Department immediately.  Strict ER precautions given to  patient.  Patient aware and verbalized understanding.

## 2024-06-16 NOTE — PROGRESS NOTES
"Subjective:      Patient ID: Trevor Johnston is a 68 y.o. male.    Vitals:  height is 5' 5" (1.651 m) and weight is 76.7 kg (169 lb). His oral temperature is 98.1 °F (36.7 °C). His blood pressure is 134/81 and his pulse is 61. His respiration is 15 and oxygen saturation is 97%.     Chief Complaint: Back Pain (Entered by patient)    Patient presents to urgent care with right-sided low back pain. He states that it started 1-2 days ago. He has been moving boxes and toilets within the last couple of weeks. Most recent was heavy toilets at the house and thinks he pulled a muscle. Last A1C was 04/23/2024 - 6.8. Patient has only taken OTC Tylenol with mild relief. Patient currently denies weakness, numbness/tingling or urinary/bowel incontinence. Patient denies recent back fall/injury/trauma.    Back Pain  This is a new problem. The current episode started yesterday. The problem occurs constantly. The problem is unchanged. The pain is present in the lumbar spine. The quality of the pain is described as aching (Soreness). The pain does not radiate. The pain is at a severity of 5/10. The pain is moderate. The symptoms are aggravated by bending and position. Stiffness is present All day. Pertinent negatives include no abdominal pain, bladder incontinence, bowel incontinence, chest pain, dysuria, fever, headaches, leg pain, numbness, paresis, paresthesias, pelvic pain, perianal numbness, tingling, weakness or weight loss. Treatments tried: Tylenol. The treatment provided mild relief.       Constitution: Negative for chills, sweating, fatigue and fever.   HENT:  Negative for ear pain, drooling, congestion, sore throat, trouble swallowing and voice change.    Neck: Negative for neck pain, neck stiffness, painful lymph nodes and neck swelling.   Cardiovascular:  Negative for chest pain, leg swelling, palpitations, sob on exertion and passing out.   Eyes:  Negative for eye discharge, eye itching, eye pain, eye redness and eyelid " swelling.   Respiratory:  Negative for chest tightness, cough, sputum production, bloody sputum, shortness of breath, stridor and wheezing.    Gastrointestinal:  Negative for abdominal pain, abdominal bloating, nausea, vomiting, constipation, diarrhea, heartburn and bowel incontinence.   Genitourinary:  Negative for dysuria, urine decreased, bladder incontinence and pelvic pain.   Musculoskeletal:  Positive for back pain and muscle ache. Negative for joint pain, joint swelling, abnormal ROM of joint, pain with walking and muscle cramps.   Skin:  Negative for rash and hives.   Allergic/Immunologic: Negative for hives, itching and sneezing.   Neurological:  Negative for dizziness, light-headedness, passing out, facial drooping, speech difficulty, loss of balance, headaches, altered mental status, loss of consciousness, numbness, tingling and seizures.   Hematologic/Lymphatic: Negative for swollen lymph nodes.   Psychiatric/Behavioral:  Negative for altered mental status and nervous/anxious. The patient is not nervous/anxious.       Objective:     Physical Exam   Constitutional: He is oriented to person, place, and time. He appears well-developed. He is cooperative.  Non-toxic appearance. He does not appear ill. No distress.   HENT:   Head: Normocephalic and atraumatic.   Ears:   Right Ear: External ear normal.   Left Ear: External ear normal.   Nose: Nose normal.   Mouth/Throat: Uvula is midline, oropharynx is clear and moist and mucous membranes are normal.   Eyes: Conjunctivae and lids are normal.   Neck: Trachea normal and phonation normal. Neck supple.   Cardiovascular: Normal rate, regular rhythm, normal heart sounds and normal pulses.   Pulmonary/Chest: Effort normal and breath sounds normal. No accessory muscle usage or stridor. He has no decreased breath sounds.   Abdominal: Normal appearance and bowel sounds are normal. He exhibits no mass. Soft.   Musculoskeletal:         General: No deformity.      Thoracic  back: Normal.      Lumbar back: He exhibits decreased range of motion, tenderness and spasm. He exhibits no bony tenderness.        Back:    Neurological: He is alert and oriented to person, place, and time. He has normal sensation, normal strength and normal reflexes. No sensory deficit. Gait normal.   Skin: Skin is warm, dry, intact, not diaphoretic and no rash. Capillary refill takes less than 2 seconds.   Psychiatric: His speech is normal and behavior is normal. Judgment and thought content normal.   Nursing note and vitals reviewed.    Assessment:     1. Right-sided low back pain without sciatica, unspecified chronicity      Plan:   Reviewed patient's . Patient reports that he will monitor sugars at home. Advised close follow-up with PCP and/or Specialist for further evaluation as needed. ER precautions given to patient as well. Patient aware, verbalized understanding and agreed with plan of care.    Right-sided low back pain without sciatica, unspecified chronicity  -     tiZANidine (ZANAFLEX) 4 MG tablet; Take 1 tablet (4 mg total) by mouth every 6 (six) hours as needed (for muscle cramps/spasms). Will cause drowsiness.  Dispense: 30 tablet; Refill: 0  -     predniSONE (DELTASONE) 20 MG tablet; Take 1 tablet (20 mg total) by mouth once daily.  Dispense: 7 tablet; Refill: 0      Patient Instructions   If you were prescribed a narcotic or controlled medication, do not drive or operate heavy equipment or machinery while taking these medications.  You must understand that you've received an Urgent Care treatment only and that you may be released before all your medical problems are known or treated. You, the patient, will arrange for follow up care as instructed.  Follow up with your PCP or specialty clinic as directed if not improved or as needed. You can call [phone] to schedule an appointment with the appropriate provider.  If your condition worsens we recommend that you receive another evaluation at  the Emergency Department for any concerns or worsening of condition.  Patient aware and verbalized understanding.    Recommended no heavy lifting until symptoms resolve.  Prednisone RX as prescribed for inflammation/swelling - monitor sugars at home.  Zanaflex RX (muscle relaxer) - will make you drowsy, so please do not drive or operate heavy equipment or machinery while taking this medication.  You may do gently stretching if tolerable.  Moist warm compresses to area several times daily.   Advised patient to use a Heating pad at home on LOW and Warm baths with OTC Epsom Salt as discussed - MAKE SURE YOU DO NOT FALL ASLEEP WITH a HEATING PAD ON.  Do not stay in one position too long. When sleeping on your back, place a pillow under the knees to reduce tension on back. If sleeping on your side, place a pillow between the knees to keep spine in better alignment.   Wear supportive shoes such as tennis shoes for support of the neck and lower back during the day.  Follow-up with PCP for further evaluation as needed.  Follow-up with Ortho for further evaluation if still experiencing pain as discussed.  If you lose control of any extremity, your bowel and/or bladder, in between your legs by your genitalia and/or rectum, please go to the nearest Emergency Department immediately.  Strict ER precautions given to patient.  Patient aware and verbalized understanding.

## 2024-06-25 ENCOUNTER — OFFICE VISIT (OUTPATIENT)
Dept: URGENT CARE | Facility: CLINIC | Age: 69
End: 2024-06-25
Payer: MEDICARE

## 2024-06-25 VITALS
OXYGEN SATURATION: 98 % | BODY MASS INDEX: 28.16 KG/M2 | HEART RATE: 91 BPM | DIASTOLIC BLOOD PRESSURE: 86 MMHG | SYSTOLIC BLOOD PRESSURE: 122 MMHG | WEIGHT: 169 LBS | TEMPERATURE: 98 F | HEIGHT: 65 IN | RESPIRATION RATE: 18 BRPM

## 2024-06-25 DIAGNOSIS — J06.9 VIRAL URI WITH COUGH: Primary | ICD-10-CM

## 2024-06-25 LAB
CTP QC/QA: YES
SARS-COV-2 AG RESP QL IA.RAPID: NEGATIVE

## 2024-06-25 PROCEDURE — 87811 SARS-COV-2 COVID19 W/OPTIC: CPT | Mod: QW,S$GLB,, | Performed by: PHYSICIAN ASSISTANT

## 2024-06-25 PROCEDURE — 99213 OFFICE O/P EST LOW 20 MIN: CPT | Mod: S$GLB,,, | Performed by: PHYSICIAN ASSISTANT

## 2024-06-25 RX ORDER — BENZONATATE 200 MG/1
200 CAPSULE ORAL 3 TIMES DAILY PRN
Qty: 30 CAPSULE | Refills: 0 | Status: SHIPPED | OUTPATIENT
Start: 2024-06-25 | End: 2024-07-05

## 2024-06-25 NOTE — PATIENT INSTRUCTIONS
"                                                         URI   If your condition worsens or fails to improve we recommend that you receive another evaluation at the urgent care/ER immediately or contact your PCP to discuss your concerns. You must understand that you've received an urgent care treatment only and that you may be released before all your medical problems are known or treated. You the patient will arrange for follouwp care as instructed.     If we discussed that I think your illness is viral, it will not respond to antibiotics and will last 10-14 days.   Use Tessalon as needed for cough.  Retest for covid at home in 48 hours  Zyrtec D, Claritin D or Allegra D can also help with symptoms of congestion and drainage.   If you have hypertension avoid using the "D" which is the decongestant   If you just have drainage you can take plain zyrtec, claritin or allegra   Use Flonase for postnasal drip and nasal congestion  Rest and fluids are also important.     Salt water gargles, warm tea with honey and chloraseptic spray as needed for sore throat.   Tylenol or ibuprofen can also be used as directed for pain unless you have an allergy to them or medical condition such as stomach ulcers, kidney or liver disease or blood thinners etc for which you should not be taking these type of medications.         "

## 2024-06-25 NOTE — PROGRESS NOTES
"Subjective:      Patient ID: Trevor Johnston is a 68 y.o. male.    Vitals:  height is 5' 5" (1.651 m) and weight is 76.7 kg (169 lb). His oral temperature is 98 °F (36.7 °C). His blood pressure is 122/86 and his pulse is 91. His respiration is 18 and oxygen saturation is 98%.     Chief Complaint: Cough    Mr. Johnston is a 68 y.o. male who presents today productive cough and congestion x 3 days. He reports positive sick contacts. He denies prior attempted treatment. He denies fever, chills, rhinorrhea, ear pain, SOB, CP, post nasal drip, sore throat, body aches or itchy/watery eyes.    Cough  This is a new problem. The current episode started in the past 7 days. The problem has been gradually worsening. The problem occurs constantly. The cough is Non-productive. Associated symptoms include nasal congestion. Pertinent negatives include no chest pain, chills, ear congestion, ear pain, fever, headaches, hemoptysis, myalgias, postnasal drip, rash, rhinorrhea, sore throat, shortness of breath, sweats, weight loss or wheezing. Nothing aggravates the symptoms. He has tried nothing for the symptoms. The treatment provided no relief.       Constitution: Negative for appetite change, chills, fatigue and fever.   HENT:  Negative for ear pain, ear discharge, hearing loss, dental problem, drooling, mouth sores, tongue pain, tongue lesion, facial swelling, congestion, postnasal drip, sinus pain, sinus pressure, sore throat, trouble swallowing and voice change.    Neck: Negative for neck pain and neck stiffness.   Cardiovascular:  Negative for chest pain and sob on exertion.   Eyes:  Negative for eye discharge and eye itching.   Respiratory:  Positive for cough and sputum production (clear). Negative for chest tightness, bloody sputum, shortness of breath and wheezing.    Gastrointestinal:  Negative for abdominal pain, nausea, vomiting, constipation and diarrhea.   Musculoskeletal:  Negative for muscle ache.   Skin:  Negative for " rash.   Neurological:  Negative for dizziness and headaches.      Past Medical History:   Diagnosis Date    Anticoagulant long-term use     Arthritis     ASD (atrial septal defect)     noted on 11/20 ECHO    Diabetes mellitus, type 2     Fatty liver disease, nonalcoholic     noted on CT    H/O cardiovascular stress test     normal 6/20    Hyperlipidemia     Hypertension     Kidney stones     multiple episodes - on allopurinol    Macular degeneration, right eye     ROBERT (obstructive sleep apnea)     does not use CPAP    PAF (paroxysmal atrial fibrillation)     s/p cardioversion 11/20- pt denies    PFO with atrial septal aneurysm     RLS (restless legs syndrome)     Valvular heart disease     mod MR and mild TR on 6/20 echo       Past Surgical History:   Procedure Laterality Date    CATARACT EXTRACTION W/  INTRAOCULAR LENS IMPLANT Right 02/2017    COLONOSCOPY      COLONOSCOPY N/A 9/30/2021    Procedure: COLONOSCOPY;  Surgeon: Sb Mcgraw MD;  Location: [facility];  Service: Endoscopy;  Laterality: N/A;    CYSTOURETEROSCOPY WITH RETROGRADE PYELOGRAPHY AND INSERTION OF STENT INTO URETER Left 3/5/2020    Procedure: CYSTOURETEROSCOPY, WITH RETROGRADE PYELOGRAM AND URETERAL STENT EXCHANGE, flexible ureterscope;  Surgeon: Sb Connelly MD;  Location: [facility] OR;  Service: Urology;  Laterality: Left;    EXTRACORPOREAL SHOCK WAVE LITHOTRIPSY Left 7/15/2020    Procedure: LITHOTRIPSY, ESWL;  Surgeon: Sb Connelly MD;  Location: [facility];  Service: Urology;  Laterality: Left;    EXTRACORPOREAL SHOCK WAVE LITHOTRIPSY N/A 8/12/2020    Procedure: LITHOTRIPSY, ESWL;  Surgeon: Sb Connelly MD;  Location: [facility] OR;  Service: Urology;  Laterality: N/A;    EXTRACORPOREAL SHOCK WAVE LITHOTRIPSY Left 12/29/2022    Procedure: LITHOTRIPSY, ESWL;  Surgeon: Sb Connelly MD;  Location: [facility] OR;  Service: Urology;  Laterality: Left;    INSERTION OF IMPLANTABLE LOOP RECORDER N/A 4/15/2022    Procedure: Insertion,  Implantable Loop Recorder;  Surgeon: Rodrigo Isabel MD;  Location: [facility] CATH;  Service: Cardiology;  Laterality: N/A;    LASER LITHOTRIPSY Left 3/5/2020    Procedure: LITHOTRIPSY, USING LASER;  Surgeon: Sb Connelly MD;  Location: [facility] OR;  Service: Urology;  Laterality: Left;    ROTATOR CUFF REPAIR Right     TRIGGER FINGER RELEASE Bilateral     VASECTOMY         Family History   Problem Relation Name Age of Onset    Diabetes Mother      Diabetes Father      Cancer Maternal Grandfather      Throat cancer Maternal Grandfather         Social History     Socioeconomic History    Marital status: Single   Tobacco Use    Smoking status: Some Days     Current packs/day: 0.00     Types: Cigarettes     Start date: 1972     Last attempt to quit: 2017     Years since quittin.4    Smokeless tobacco: Never    Tobacco comments:     45 year, about 1PPW, quit 2017   Substance and Sexual Activity    Alcohol use: Not Currently    Drug use: No    Sexual activity: Yes     Partners: Female     Birth control/protection: See Surgical Hx   Social History Narrative    18: he is retired. He used to work as a  for 10 years and also an . The last 10 years he was an  for [employer]. He lives with his wife.He has two kids; they live nearby.      Social Determinants of Health     Financial Resource Strain: Low Risk  (2024)    Overall Financial Resource Strain (CARDIA)     Difficulty of Paying Living Expenses: Not hard at all   Food Insecurity: No Food Insecurity (2024)    Hunger Vital Sign     Worried About Running Out of Food in the Last Year: Never true     Ran Out of Food in the Last Year: Never true   Transportation Needs: No Transportation Needs (2024)    PRAPARE - Transportation     Lack of Transportation (Medical): No     Lack of Transportation (Non-Medical): No   Physical Activity: Inactive (2024)    Exercise Vital Sign     Days of Exercise per Week: 0 days     Minutes of  Exercise per Session: 0 min   Stress: No Stress Concern Present (2/13/2024)    [employer] [place] [employer] - Occupational Stress Questionnaire     Feeling of Stress : Only a little   Housing Stability: Low Risk  (2/13/2024)    Housing Stability Vital Sign     Unable to Pay for Housing in the Last Year: No     Number of Places Lived in the Last Year: 1     Unstable Housing in the Last Year: No       Current Outpatient Medications   Medication Sig Dispense Refill    allopurinoL (ZYLOPRIM) 300 MG tablet Take 1 tablet (300 mg total) by mouth once daily. 90 tablet 1    atorvastatin (LIPITOR) 40 MG tablet TAKE 1 TABLET DAILY 90 tablet 1    blood sugar diagnostic Strp       blood sugar diagnostic Strp To check BG 1 times daily, to use with insurance preferred meter 200 each 1    brimonidine 0.2% (ALPHAGAN) 0.2 % Drop Place 1 drop into the right eye 2 (two) times a day.       dorzolamide-timolol 2-0.5% (COSOPT) 22.3-6.8 mg/mL ophthalmic solution Place 1 drop into the right eye 2 (two) times daily.       empagliflozin (JARDIANCE) 10 mg tablet Take 1 tablet (10 mg total) by mouth once daily. 90 tablet 1    irbesartan (AVAPRO) 75 MG tablet TAKE 1 TABLET EVERY EVENING 90 tablet 3    LUMIGAN 0.01 % Drop Place 1 drop into both eyes every evening.      metFORMIN (GLUCOPHAGE-XR) 500 MG ER 24hr tablet Take 1 tablet (500 mg total) by mouth daily with breakfast AND 2 tablets (1,000 mg total) every evening. 180 tablet 1    metoprolol succinate (TOPROL-XL) 25 MG 24 hr tablet TAKE 1 TABLET TWICE A  tablet 4    predniSONE (DELTASONE) 20 MG tablet Take 1 tablet (20 mg total) by mouth once daily. 7 tablet 0    sildenafiL (VIAGRA) 100 MG tablet Take 1 tablet (100 mg total) by mouth daily as needed for Erectile Dysfunction. 10 tablet 8    tiZANidine (ZANAFLEX) 4 MG tablet Take 1 tablet (4 mg total) by mouth every 6 (six) hours as needed (for muscle cramps/spasms). Will cause drowsiness. 30 tablet 0    ULTRA THIN LANCETS 30  gauge Misc USE TO CHECK BLOOD GLUCOSE ONCE DAILY      XARELTO 20 mg Tab TAKE 1 TABLET EVERY EVENING 90 tablet 3    benzonatate (TESSALON) 200 MG capsule Take 1 capsule (200 mg total) by mouth 3 (three) times daily as needed for Cough. 30 capsule 0     No current facility-administered medications for this visit.     Facility-Administered Medications Ordered in Other Visits   Medication Dose Route Frequency Provider Last Rate Last Admin    ciprofloxacin (CIPRO)400mg/200ml D5W IVPB 400 mg  400 mg Intravenous On Call Procedure Sb Connelly MD   400 mg at 12/29/22 1115    diphenhydrAMINE injection 25 mg  25 mg Intravenous Q6H PRN Ayan Stephens MD        HYDROmorphone injection 0.5 mg  0.5 mg Intravenous Q5 Min PRN Ayan Stephens MD        lactated ringers infusion   Intravenous Continuous Tammy Marvin MD 10 mL/hr at 08/12/20 1150 New Bag at 08/12/20 1249    lactated ringers infusion   Intravenous Continuous Finesse De La Rosa MD   Stopped at 03/17/21 1035    lactated ringers infusion   Intravenous Continuous Finesse De La Rosa MD   Stopped at 12/29/22 1315    lidocaine (PF) 10 mg/ml (1%) injection 10 mg  1 mL Intradermal Once Tammy Marvin MD        LIDOcaine (PF) 10 mg/ml (1%) injection 10 mg  1 mL Other Once Finesse De La Rosa MD        ondansetron injection 4 mg  4 mg Intravenous Daily PRN Ayan Stephens MD        prochlorperazine injection Soln 5 mg  5 mg Intravenous Q30 Min PRN Ayan Stephens MD        sodium chloride 0.9% bolus 250 mL 250 mL  250 mL Intravenous Once Ayan Stephens MD           Review of patient's allergies indicates:  No Known Allergies    Objective:     Physical Exam   Constitutional: He is oriented to person, place, and time. He appears well-developed. He is cooperative.  Non-toxic appearance. He does not appear ill. No distress. normal  HENT:   Head: Normocephalic and atraumatic.   Ears:   Right Ear: Hearing, tympanic membrane,  external ear and ear canal normal. no impacted cerumen  Left Ear: Hearing, tympanic membrane, external ear and ear canal normal. no impacted cerumen  Nose: Nose normal. No mucosal edema, rhinorrhea, nasal deformity or congestion. No epistaxis. Right sinus exhibits no maxillary sinus tenderness and no frontal sinus tenderness. Left sinus exhibits no maxillary sinus tenderness and no frontal sinus tenderness.   Mouth/Throat: Uvula is midline, oropharynx is clear and moist and mucous membranes are normal. Mucous membranes are moist. No trismus in the jaw. Normal dentition. No uvula swelling. No oropharyngeal exudate, posterior oropharyngeal edema or posterior oropharyngeal erythema.   Eyes: Conjunctivae and lids are normal. Right eye exhibits no discharge. Left eye exhibits no discharge. No scleral icterus.   Neck: Trachea normal and phonation normal. Neck supple. No edema present. No erythema present. No neck rigidity present.   Cardiovascular: Normal rate, regular rhythm and normal pulses.   Murmur heard.  Systolic murmur is present.Exam reveals no gallop.   Pulmonary/Chest: Effort normal and breath sounds normal. No stridor. No respiratory distress. He has no decreased breath sounds. He has no wheezes. He has no rhonchi. He has no rales.   Abdominal: Normal appearance.   Musculoskeletal:      Cervical back: He exhibits no tenderness.   Lymphadenopathy:     He has no cervical adenopathy.   Neurological: He is alert and oriented to person, place, and time. He exhibits normal muscle tone.   Skin: Skin is warm, dry, intact, not diaphoretic, not pale and no rash.   Psychiatric: His speech is normal and behavior is normal. Mood, judgment and thought content normal.   Nursing note and vitals reviewed.    Results for orders placed or performed in visit on 06/25/24   SARS Coronavirus 2 Antigen, POCT Manual Read   Result Value Ref Range    SARS Coronavirus 2 Antigen Negative Negative     Acceptable Yes   "      Assessment:     1. Viral URI with cough        Plan:   Patient presents to clinic with cough and congestion x 3 days. Patient afebrile, VSS. On exam lungs CTA bilaterally. Covid POCT negative in clinic. Patient history, symptoms, and exam most consistent with viral infection at this time.     Viral URI with cough  -     SARS Coronavirus 2 Antigen, POCT Manual Read  -     benzonatate (TESSALON) 200 MG capsule; Take 1 capsule (200 mg total) by mouth 3 (three) times daily as needed for Cough.  Dispense: 30 capsule; Refill: 0    There are no Patient Instructions on file for this visit.      Results reviewed  I have reviewed the patient chart and pertinent past imaging/labs.  Pa-student edwin rebollar        Patient Instructions                                                            URI   If your condition worsens or fails to improve we recommend that you receive another evaluation at the urgent care/ER immediately or contact your PCP to discuss your concerns. You must understand that you've received an urgent care treatment only and that you may be released before all your medical problems are known or treated. You the patient will arrange for [facility] care as instructed.     If we discussed that I think your illness is viral, it will not respond to antibiotics and will last 10-14 days.   Use Tessalon as needed for cough.  Retest for covid at home in 48 hours  Zyrtec D, Claritin D or Allegra D can also help with symptoms of congestion and drainage.   If you have hypertension avoid using the "D" which is the decongestant   If you just have drainage you can take plain zyrtec, claritin or allegra   Use Flonase for postnasal drip and nasal congestion  Rest and fluids are also important.     Salt water gargles, warm tea with honey and chloraseptic spray as needed for sore throat.   Tylenol or ibuprofen can also be used as directed for pain unless you have an allergy to them or medical condition such as stomach " ulcers, kidney or liver disease or blood thinners etc for which you should not be taking these type of medications.

## 2024-06-25 NOTE — PROGRESS NOTES
"Subjective:      Patient ID: Trevor Johnston is a 68 y.o. male.    Vitals:  height is 5' 5" (1.651 m) and weight is 76.7 kg (169 lb). His oral temperature is 98 °F (36.7 °C). His blood pressure is 122/86 and his pulse is 91. His respiration is 18 and oxygen saturation is 98%.     Chief Complaint: Cough    This is a 68 y.o. male who presents today with a chief complaint of cough, and congestion. Patient developed a cough and congestion 3 days ago. Pt expresses that everyone around him has been sick with a cough.     Cough  This is a new problem. The current episode started in the past 7 days. The problem has been gradually worsening. The problem occurs constantly. The cough is Non-productive. Associated symptoms include nasal congestion and postnasal drip. Pertinent negatives include no chest pain, chills, ear congestion, ear pain, fever, headaches, heartburn, hemoptysis, myalgias, rash, rhinorrhea, sore throat, shortness of breath, sweats, weight loss or wheezing. Nothing aggravates the symptoms. He has tried nothing for the symptoms. The treatment provided no relief.     Constitution: Negative for chills and fever.   HENT:  Positive for postnasal drip. Negative for ear pain and sore throat.    Cardiovascular:  Negative for chest pain.   Respiratory:  Positive for cough. Negative for bloody sputum, shortness of breath and wheezing.    Gastrointestinal:  Negative for heartburn.   Musculoskeletal:  Negative for muscle ache.   Skin:  Negative for rash.   Neurological:  Negative for headaches.    Objective:     Physical Exam    Assessment:     No diagnosis found.    Plan:       There are no diagnoses linked to this encounter.    Results reviewed  I have reviewed the patient chart and pertinent past imaging/labs.  Pa-student edwin rebollar            "

## 2024-09-16 NOTE — TELEPHONE ENCOUNTER
Refill Decision Note   Trevor Johnston  is requesting a refill authorization.  Brief Assessment and Rationale for Refill:  Approve     Medication Therapy Plan: FLOS;      Pharmacist review requested: Yes   Extended chart review required: Yes   Comments:     Note composed:2:07 PM 09/16/2024

## 2024-09-16 NOTE — TELEPHONE ENCOUNTER
Care Due:                  Date            Visit Type   Department     Provider  --------------------------------------------------------------------------------                                EP -                              PRIMARY      [facility] Blanca Doe  Last Visit: 04-      CARE ([facility])   MEDICINE       Anger                              EP -                              PRIMARY      [facility]    Blanca Doe  Next Visit: 10-      CARE ([facility])   MEDICINE       Anger                                                            Last  Test          Frequency    Reason                     Performed    Due Date  --------------------------------------------------------------------------------    HBA1C.......  6 months...  empagliflozin, metFORMIN.  04-   10-    Lipid Panel.  12 months..  atorvastatin.............  10-   10-    [employer] Embedded Care Due Messages. Reference number: [id number].   9/16/2024 3:00:52 AM CDT

## 2024-09-16 NOTE — TELEPHONE ENCOUNTER
Refill Routing Note   Medication(s) are not appropriate for processing by [facility] for the following reason(s):        Drug-disease interaction    [facility] action(s):  Defer      Medication Therapy Plan: FLOS;  Hypertension associated with diabetes    Pharmacist review requested: Yes     Appointments  past 12m or future 3m with PCP    Date Provider   Last Visit   4/29/2024 Blanca Doe MD   Next Visit   10/31/2024 Blanca Doe MD   ED visits in past 90 days: 0        Note composed:1:06 PM 09/16/2024

## 2024-09-24 ENCOUNTER — OFFICE VISIT (OUTPATIENT)
Dept: OTOLARYNGOLOGY | Facility: CLINIC | Age: 69
End: 2024-09-24
Payer: MEDICARE

## 2024-09-24 ENCOUNTER — CLINICAL SUPPORT (OUTPATIENT)
Dept: AUDIOLOGY | Facility: CLINIC | Age: 69
End: 2024-09-24
Payer: MEDICARE

## 2024-09-24 DIAGNOSIS — H93.13 TINNITUS AURIUM, BILATERAL: ICD-10-CM

## 2024-09-24 DIAGNOSIS — H91.93 HIGH FREQUENCY HEARING LOSS OF BOTH EARS: Primary | ICD-10-CM

## 2024-09-24 DIAGNOSIS — H93.13 TINNITUS OF BOTH EARS: ICD-10-CM

## 2024-09-24 DIAGNOSIS — H90.3 SENSORINEURAL HEARING LOSS (SNHL) OF BOTH EARS: Primary | ICD-10-CM

## 2024-09-24 PROCEDURE — 99999 PR PBB SHADOW E&M-EST. PATIENT-LVL I: CPT | Mod: PBBFAC,,, | Performed by: AUDIOLOGIST

## 2024-09-24 PROCEDURE — 99204 OFFICE O/P NEW MOD 45 MIN: CPT | Mod: S$PBB,,, | Performed by: STUDENT IN AN ORGANIZED HEALTH CARE EDUCATION/TRAINING PROGRAM

## 2024-09-24 PROCEDURE — 99211 OFF/OP EST MAY X REQ PHY/QHP: CPT | Mod: PBBFAC,27,PO,25 | Performed by: AUDIOLOGIST

## 2024-09-24 PROCEDURE — 99999 PR PBB SHADOW E&M-EST. PATIENT-LVL II: CPT | Mod: PBBFAC,,, | Performed by: STUDENT IN AN ORGANIZED HEALTH CARE EDUCATION/TRAINING PROGRAM

## 2024-09-24 PROCEDURE — 92567 TYMPANOMETRY: CPT | Mod: PBBFAC,PO | Performed by: AUDIOLOGIST

## 2024-09-24 PROCEDURE — 99212 OFFICE O/P EST SF 10 MIN: CPT | Mod: PBBFAC,PO | Performed by: STUDENT IN AN ORGANIZED HEALTH CARE EDUCATION/TRAINING PROGRAM

## 2024-09-24 PROCEDURE — 92557 COMPREHENSIVE HEARING TEST: CPT | Mod: PBBFAC,PO | Performed by: AUDIOLOGIST

## 2024-09-24 NOTE — PROGRESS NOTES
Otolaryngology Clinic Note    Subjective:       Patient ID: Trevor Johnston is a 68 y.o. male.    Chief Complaint: Hearing Loss      History of Present Illness: Trevor Johnston is a 68 y.o. male presenting with mild hearing concerns. Has been told he talks loud. Some tinnitus both ears. No dizziness.   No FH early hearing loss. No ototoxic meds. No head trauma. Was in  around [place]. Is not service connected for hearing as was not documented during his time.       Past Surgical History:   Procedure Laterality Date    CATARACT EXTRACTION W/  INTRAOCULAR LENS IMPLANT Right 02/2017    COLONOSCOPY      COLONOSCOPY N/A 9/30/2021    Procedure: COLONOSCOPY;  Surgeon: Sb Mcgraw MD;  Location: [facility] ENDO;  Service: Endoscopy;  Laterality: N/A;    CYSTOURETEROSCOPY WITH RETROGRADE PYELOGRAPHY AND INSERTION OF STENT INTO URETER Left 3/5/2020    Procedure: CYSTOURETEROSCOPY, WITH RETROGRADE PYELOGRAM AND URETERAL STENT EXCHANGE, flexible ureterscope;  Surgeon: Sb Connelly MD;  Location: [facility] OR;  Service: Urology;  Laterality: Left;    EXTRACORPOREAL SHOCK WAVE LITHOTRIPSY Left 7/15/2020    Procedure: LITHOTRIPSY, ESWL;  Surgeon: Sb Connelly MD;  Location: [facility] CSC;  Service: Urology;  Laterality: Left;    EXTRACORPOREAL SHOCK WAVE LITHOTRIPSY N/A 8/12/2020    Procedure: LITHOTRIPSY, ESWL;  Surgeon: Sb Connelly MD;  Location: [facility] OR;  Service: Urology;  Laterality: N/A;    EXTRACORPOREAL SHOCK WAVE LITHOTRIPSY Left 12/29/2022    Procedure: LITHOTRIPSY, ESWL;  Surgeon: Sb Connelly MD;  Location: [facility] OR;  Service: Urology;  Laterality: Left;    INSERTION OF IMPLANTABLE LOOP RECORDER N/A 4/15/2022    Procedure: Insertion, Implantable Loop Recorder;  Surgeon: Rodrigo Isabel MD;  Location: [facility] CATH;  Service: Cardiology;  Laterality: N/A;    LASER LITHOTRIPSY Left 3/5/2020    Procedure: LITHOTRIPSY, USING LASER;  Surgeon: Sb Connelly MD;  Location: [facility] OR;  Service: Urology;   Laterality: Left;    ROTATOR CUFF REPAIR Right     TRIGGER FINGER RELEASE Bilateral     VASECTOMY       Past Medical History:   Diagnosis Date    Anticoagulant long-term use     Arthritis     ASD (atrial septal defect)     noted on 11/20 ECHO    Diabetes mellitus, type 2     Fatty liver disease, nonalcoholic     noted on CT    H/O cardiovascular stress test     normal 6/20    Hyperlipidemia     Hypertension     Kidney stones     multiple episodes - on allopurinol    Macular degeneration, right eye     ROBERT (obstructive sleep apnea)     does not use CPAP    PAF (paroxysmal atrial fibrillation)     s/p cardioversion 11/20- pt denies    PFO with atrial septal aneurysm     RLS (restless legs syndrome)     Valvular heart disease     mod MR and mild TR on 6/20 echo     Social Determinants of Health     Tobacco Use: High Risk (7/3/2024)    Patient History     Smoking Tobacco Use: Some Days     Smokeless Tobacco Use: Never     Passive Exposure: Not on file   Alcohol Use: Not At Risk (2/13/2024)    AUDIT-C     Frequency of Alcohol Consumption: Monthly or less     Average Number of Drinks: 1 or 2     Frequency of Binge Drinking: Never   Financial Resource Strain: Low Risk  (2/13/2024)    Overall Financial Resource Strain (CARDIA)     Difficulty of Paying Living Expenses: Not hard at all   Food Insecurity: No Food Insecurity (2/13/2024)    Hunger Vital Sign     Worried About Running Out of Food in the Last Year: Never true     Ran Out of Food in the Last Year: Never true   Transportation Needs: No Transportation Needs (2/13/2024)    PRAPARE - Transportation     Lack of Transportation (Medical): No     Lack of Transportation (Non-Medical): No   Physical Activity: Inactive (2/13/2024)    Exercise Vital Sign     Days of Exercise per Week: 0 days     Minutes of Exercise per Session: 0 min   Stress: No Stress Concern Present (2/13/2024)    [employer] - Occupational Stress Questionnaire     Feeling of  Stress : Only a little   Housing Stability: Low Risk  (2/13/2024)    Housing Stability Vital Sign     Unable to Pay for Housing in the Last Year: No     Number of Places Lived in the Last Year: 1     Unstable Housing in the Last Year: No   Depression: Low Risk  (4/29/2024)    Depression     Last PHQ-4: Flowsheet Data: 0   Utilities: Not on file   Health Literacy: Not on file   Social Isolation: Not on file     Review of patient's allergies indicates:  No Known Allergies  Current Outpatient Medications   Medication Instructions    allopurinoL (ZYLOPRIM) 300 mg, Oral, Daily    atorvastatin (LIPITOR) 40 mg, Oral    blood sugar diagnostic Strp No dose, route, or frequency recorded.    blood sugar diagnostic Strp To check BG 1 times daily, to use with insurance preferred meter    brimonidine 0.2% (ALPHAGAN) 0.2 % Drop 1 drop, Right Eye, 2 times daily    dorzolamide-timolol 2-0.5% (COSOPT) 22.3-6.8 mg/mL ophthalmic solution 1 drop, Right Eye, 2 times daily    empagliflozin (JARDIANCE) 10 mg tablet Take 1 tablet (10 mg total) by mouth once daily.    irbesartan (AVAPRO) 75 mg, Oral, Nightly    LUMIGAN 0.01 % Drop 1 drop, Both Eyes, Nightly    metFORMIN (GLUCOPHAGE-XR) 500 MG ER 24hr tablet Take 1 tablet (500 mg total) by mouth daily with breakfast AND 2 tablets (1,000 mg total) every evening.    metoprolol succinate (TOPROL-XL) 25 MG 24 hr tablet TAKE 1 TABLET TWICE A DAY    sildenafiL (VIAGRA) 100 mg, Oral, Daily PRN    ULTRA THIN LANCETS 30 gauge Misc USE TO CHECK BLOOD GLUCOSE ONCE DAILY    XARELTO 20 mg Tab TAKE 1 TABLET EVERY EVENING         ENT ROS negative except as stated above.     Patient answers are not available for this visit.            Objective:      There were no vitals filed for this visit.    General: NAD, well appearing  Eyes: Normal conjunctiva and lids  Face: symmetric, nerve intact  Nose: The nose is without any evidence of any deformity. The nasal mucosa is moist. The septum is midline. There is no  evidence of septal hematoma. The turbinates are without abnormality.   Ears: The ears are with normal-appearing pinna. Examination of the canals is normal appearing bilaterally. There is no drainage or erythema noted. The tympanic membranes are normal appearing with pearly color, normal-appearing landmarks and normal light reflex. Hearing is grossly intact.  Mouth: No obvious abnormalities to the lips. The teeth are unremarkable. The gingivae are without any obvious evidence of infection or lesion. The oral mucosa is moist and pink. There are no obvious masses to the hard or soft palate.   Oropharynx: The uvula is midline.  The tongue is midline. The posterior pharynx is without erythema or exudate. The tonsils are normal appearing.  Salivary glands: The salivary glands are symmetric and not enlarged, no masses  Neck: No lymphadenopathy, trachea midline, thryoid not enlarged.  Psych: Normal mood and affect.   Neuro: Grossly intact  Speech: fluent    Test Review: I personally reviewed audiogram       Assessment and Plan:       1. Sensorineural hearing loss (SNHL) of both ears    2. Tinnitus aurium, bilateral          Has some HF hearing loss/noise notch. He is not ready for hearing aids, but he is medically cleared for hearing aids.   Recommend hearing protection.   Reviewed tinnitus    RTC: PRN    Plan of care was discussed in detail with the patient, who agreed with the plan as above. All questions were answered in detail.     Fanta Sheikh MD  Otolaryngology

## 2024-09-24 NOTE — PROGRESS NOTES
The patient was referred by Dr. Fanta Sheikh for a hearing evaluation.    Report from the patient was as follows:    Difficulty Hearing/Understanding - last hearing evaluation about 6-8 years ago at [facility] (these results not available today)  Tinnitus - AU  History of Loud Noise Exposure - in   Family History of Hearing Loss - father and brother with hearing loss with onset in older age    Otoscopic screening revealed a clear view of TM AU    A hearing evaluation was performed today. Test results indicated mild high frequency hearing loss in the right ear and mild to moderate high frequency hearing loss in the left ear. Impedance testing showed a Type A tympanogram in each ear, consistent with normal middle ear function.    The recommendations were as follows:     (1)  Medical evaluation due to slight asymmetry  (2)  Hearing aid consult pending medical clearance  (3)  Ear protection in loud noise   (4)  Hearing evaluation in one year or sooner if hearing decrease is noted       Today's test results and recommendations were discussed with the patient.

## 2024-09-25 ENCOUNTER — PATIENT MESSAGE (OUTPATIENT)
Dept: FAMILY MEDICINE | Facility: CLINIC | Age: 69
End: 2024-09-25
Payer: MEDICARE

## 2024-09-25 DIAGNOSIS — F19.20 ADDICTION: Primary | ICD-10-CM

## 2024-09-30 ENCOUNTER — PATIENT MESSAGE (OUTPATIENT)
Dept: PSYCHIATRY | Facility: CLINIC | Age: 69
End: 2024-09-30
Payer: MEDICARE

## 2024-10-01 NOTE — PROGRESS NOTES
Outpatient Psychotherapy Initial Visit  10/9/2024    History of Presenting Illness:    Pt is a  68 y.o. male referred by Blanca Doe MD for addiction. Pt was seen, examined and chart was reviewed. Pt reviewed and agreed to informed consent and limits of confidentiality. LPC met with Pt.     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 Pt stated issues within in his relationship due to lack of intimacy. Pt reported work stress. Pt explained mother  this past year.     LPC and Pt discussed family hx. Pt was raised by both parents. Pt has 1 sibling. Pt described childhood as good.    LPC and Pt discussed family history with mental health issues/substance use. Pt denied.    LPC and Pt discussed relationships. Pt reported  been with wife for last 14 years.   LPC and Pt discussed children. Pt reported 2 children (boy and girl).     Pt identified protective factors of comfortable, money, house paid for, wife.  Pt endorsed coping skills of watch tv, sports, boat, fishing.   Pt identified strengths of loving.    LPC and Pt discussed employment status.Pt retired from [employer] 8 years ago.  LPC and Pt discussed legal issues. Pt reported a few.  LPC and Pt discussed  hx. 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      Goals: work relationship issues, relapse prevention, effective communication, cope with stress.   LPC engaged in rapport building, psychoeducation, and goal setting.  Pt goals are to work relationship issues, relapse prevention, effective communication, and cope with stress. Pt would benefit from behavior modification, insight oriented, and supportive therapies.  Pt receptive to psychotherapy. LPC assisted with scheduling follow ups.    Current symptoms:  Depression: insomnia.  Anxiety: excessive worrying and restlessness.  Sleep: difficulty falling asleep.  Panic attacks: n/a  Daxa:  denies.  Psychosis: denies .  Motivation/energy: Pt reported lower lately.  Appetite: Pt denied issues.   Hygiene: Pt denied issues.   Risk  "assessment:    Suicidal Ideation and Risk:   Pt denied current or history of related symptoms: no    [place]-Suicide Severity Rating Scale  In the last two weeks     1. Wish to be Dead: Have you ever wished you were dead or not alive anymore, or wish to fall asleep and not wake up?: No  2. Suicidal Thoughts: Have you had any thoughts of killing yourself?: No  3. Suicidal Thoughts with Method (withoutSpecific Plan or Intent to Act): Have you been thinking about how you might kill yourself? : No  4. Suicidal Intent (without Specific Plan): Have you had these thoughts and had some intention of acting on them?: No  5. Suicide Intent with Specific Plan: Have you started to work out or worked out the details of how to kill yourself? Do you intend to carry out this plan?: No  6. Suicide Behavior Question: Have you ever done anything, started to do anything, or prepare to do anything to end your life?: No  If "Yes" to question 6: How long ago did you do any of these?: Between a week and a year ago? No      Homicidal/Violent Ideation and Risk:   Pt denied current or history of related symptoms.  Pt advised to call [phone] or present to the nearest ED if they experience suicidal or homicidal ideation, plan or intent.      Past Medical History:   Diagnosis Date    Anticoagulant long-term use     Arthritis     ASD (atrial septal defect)     noted on 11/20 ECHO    Diabetes mellitus, type 2     Fatty liver disease, nonalcoholic     noted on CT    H/O cardiovascular stress test     normal 6/20    Hyperlipidemia     Hypertension     Kidney stones     multiple episodes - on allopurinol    Macular degeneration, right eye     ROBERT (obstructive sleep apnea)     does not use CPAP    PAF (paroxysmal atrial fibrillation)     s/p cardioversion 11/20- pt denies    PFO with atrial septal aneurysm     RLS (restless legs syndrome)     Valvular heart disease     mod MR and mild TR on 6/20 echo         Current Outpatient Medications:     " allopurinoL (ZYLOPRIM) 300 MG tablet, Take 1 tablet (300 mg total) by mouth once daily., Disp: 90 tablet, Rfl: 1    atorvastatin (LIPITOR) 40 MG tablet, TAKE 1 TABLET DAILY, Disp: 90 tablet, Rfl: 1    blood sugar diagnostic Strp, , Disp: , Rfl:     blood sugar diagnostic Strp, To check BG 1 times daily, to use with insurance preferred meter, Disp: 200 each, Rfl: 1    brimonidine 0.2% (ALPHAGAN) 0.2 % Drop, Place 1 drop into the right eye 2 (two) times a day. , Disp: , Rfl:     dorzolamide-timolol 2-0.5% (COSOPT) 22.3-6.8 mg/mL ophthalmic solution, Place 1 drop into the right eye 2 (two) times daily. , Disp: , Rfl:     empagliflozin (JARDIANCE) 10 mg tablet, Take 1 tablet (10 mg total) by mouth once daily., Disp: 90 tablet, Rfl: 0    irbesartan (AVAPRO) 75 MG tablet, TAKE 1 TABLET EVERY EVENING, Disp: 90 tablet, Rfl: 3    LUMIGAN 0.01 % Drop, Place 1 drop into both eyes every evening., Disp: , Rfl:     metFORMIN (GLUCOPHAGE-XR) 500 MG ER 24hr tablet, Take 1 tablet (500 mg total) by mouth daily with breakfast AND 2 tablets (1,000 mg total) every evening., Disp: 180 tablet, Rfl: 1    metoprolol succinate (TOPROL-XL) 25 MG 24 hr tablet, TAKE 1 TABLET TWICE A DAY, Disp: 180 tablet, Rfl: 4    sildenafiL (VIAGRA) 100 MG tablet, Take 1 tablet (100 mg total) by mouth daily as needed for Erectile Dysfunction., Disp: 10 tablet, Rfl: 8    ULTRA THIN LANCETS 30 gauge Misc, USE TO CHECK BLOOD GLUCOSE ONCE DAILY, Disp: , Rfl:     XARELTO 20 mg Tab, TAKE 1 TABLET EVERY EVENING, Disp: 90 tablet, Rfl: 3  No current facility-administered medications for this visit.    Facility-Administered Medications Ordered in Other Visits:     ciprofloxacin (CIPRO)400mg/200ml D5W IVPB 400 mg, 400 mg, Intravenous, On Call Procedure, Sb Connelly MD, 400 mg at 12/29/22 1115    diphenhydrAMINE injection 25 mg, 25 mg, Intravenous, Q6H PRN, Ayan Stephens MD    HYDROmorphone injection 0.5 mg, 0.5 mg, Intravenous, Q5 Min PRN, Kat,  Ayan GONZALEZ MD    lactated ringers infusion, , Intravenous, Continuous, Tammy Marvin MD, Last Rate: 10 mL/hr at 08/12/20 1150, New Bag at 08/12/20 1249    lactated ringers infusion, , Intravenous, Continuous, Finesse De La Rosa MD, Stopped at 03/17/21 1035    lactated ringers infusion, , Intravenous, Continuous, Finesse De La Rosa MD, Stopped at 12/29/22 1315    lidocaine (PF) 10 mg/ml (1%) injection 10 mg, 1 mL, Intradermal, Once, Tammy Marvin MD    LIDOcaine (PF) 10 mg/ml (1%) injection 10 mg, 1 mL, Other, Once, Finesse De La Rosa MD    ondansetron injection 4 mg, 4 mg, Intravenous, Daily PRN, Ayan Stephens MD    prochlorperazine injection Soln 5 mg, 5 mg, Intravenous, Q30 Min PRN, Ayan Stephens MD    sodium chloride 0.9% bolus 250 mL 250 mL, 250 mL, Intravenous, Once, Ayan Stephens MD    Psychiatric History:    Previous Psychiatric Outpatient Treatment:  No  Previous Psychiatric Hospitalizations:  No  Previous Suicide Attempts:  No  History of Trauma:  Yes, reported divorce in 1995.  Access to a Firearm:  Yes. Pt reported put away and unloaded.     Substance Use History:  Tobacco/Nicotine:  Yes - Pt reported some smoking cigarettes.   Alcohol:  Pt reported rare.  Illicit Substances: No  Misuse of Prescription Medications:  No  Caffeine: Yes - Pt reported one cup of coffee  Other addictive tendencies: Sex  Frequency: when he had the opportunity going out every night.  Other tendencies: 1-2 times nightly masturbation  Time spent thinking about: Frequently.      PSYCHOSOCIAL AND ENVIRONMENTAL STRESSORS:  Pt endorsed stressors of relationship.    Clinical Assessment:   Identified symptoms to address in tx: communication, relationship issues.     Ability to adhere to plan: cooperative     Rationale for employing these interactive techniques: Applicable to diagnosis     Diagnosis(es):     1. Adjustment disorder, unspecified type        2. Addiction  Ambulatory referral/consult  to Psychology          Plan   Treatment Goals:  Specify outcomes written in observable, behavioral terms:   Relationship issues: effective communication, I-statements, active listening, love languages   Addiction: behavioral changes for sex addiction, relapse prevention.     Treatment Plan/Recommendations:   The treatment plan and follow up plan were reviewed with the patient.    Pt is to attend supportive psychotherapy sessions.      This author reviewed limits to confidentiality and this author's collaboration with pt's physician. Pt indicated understanding and denied any questions.     Return to Clinic: as scheduled  Counseling time: 60     -Call to report any worsening of symptoms or problems associated with medication.  - Pt instructed to go to ER if thoughts of harming self or others arise.   -Supportive therapy and psychoeducation provided  -Pt instructed to call clinic, 911 or go to nearest emergency room if sxs worsen or pt is in crisis. The pt expresses understanding.      Each patient to whom he or she provides medical services by telemedicine is: (1) informed of the relationship between the physician and patient and the respective role of any other health care provider with respect to management of the patient; and (2) notified that he or she may decline to receive medical services by telemedicine and may withdraw from such care at any time.

## 2024-10-03 DIAGNOSIS — I48.0 PAF (PAROXYSMAL ATRIAL FIBRILLATION): ICD-10-CM

## 2024-10-04 RX ORDER — RIVAROXABAN 20 MG/1
TABLET, FILM COATED ORAL
Qty: 90 TABLET | Refills: 3 | Status: SHIPPED | OUTPATIENT
Start: 2024-10-04

## 2024-10-08 DIAGNOSIS — E11.69 TYPE 2 DIABETES MELLITUS WITH OTHER SPECIFIED COMPLICATION, WITHOUT LONG-TERM CURRENT USE OF INSULIN: ICD-10-CM

## 2024-10-08 RX ORDER — METFORMIN HYDROCHLORIDE 500 MG/1
TABLET, EXTENDED RELEASE ORAL
Qty: 270 TABLET | Refills: 0 | Status: SHIPPED | OUTPATIENT
Start: 2024-10-08

## 2024-10-08 NOTE — TELEPHONE ENCOUNTER
No care due was identified.  [facility] Embedded Care Due Messages. Reference number: [id number].   10/08/2024 9:58:38 AM CDT

## 2024-10-08 NOTE — TELEPHONE ENCOUNTER
Refill Routing Note   Medication(s) are not appropriate for processing by [facility] for the following reason(s):        Drug-disease interaction Drug-Disease: metFORMIN and Obstructed, uropathy; Obstructive uropathy     [facility] action(s):  Defer             Pharmacist review requested: Yes     Appointments  past 12m or future 3m with PCP    Date Provider   Last Visit   4/29/2024 Blanca Doe MD   Next Visit   10/31/2024 Blanca Doe MD   ED visits in past 90 days: 0        Note composed:6:13 PM 10/08/2024

## 2024-10-09 ENCOUNTER — CLINICAL SUPPORT (OUTPATIENT)
Dept: PSYCHIATRY | Facility: CLINIC | Age: 69
End: 2024-10-09
Payer: MEDICARE

## 2024-10-09 DIAGNOSIS — F19.20 ADDICTION: ICD-10-CM

## 2024-10-09 DIAGNOSIS — F43.20 ADJUSTMENT DISORDER, UNSPECIFIED TYPE: Primary | ICD-10-CM

## 2024-10-09 PROCEDURE — 99999 PR PBB SHADOW E&M-EST. PATIENT-LVL I: CPT | Mod: PBBFAC,,, | Performed by: COUNSELOR

## 2024-10-09 PROCEDURE — 99211 OFF/OP EST MAY X REQ PHY/QHP: CPT | Mod: PBBFAC,PO | Performed by: COUNSELOR

## 2024-10-09 PROCEDURE — 90791 PSYCH DIAGNOSTIC EVALUATION: CPT | Mod: ,,, | Performed by: COUNSELOR

## 2024-10-09 NOTE — TELEPHONE ENCOUNTER
Refill Decision Note   Trevor Johnston  is requesting a refill authorization.  Brief Assessment and Rationale for Refill:  Approve     Medication Therapy Plan:         Pharmacist review requested: Yes   Comments:     Note composed:8:30 PM 10/08/2024

## 2024-10-10 NOTE — PROGRESS NOTES
Individual Psychotherapy (LPC)    10/23/2024  Interim Events/Subjective Report/Content of Current Session:  follow-up appointment.    Pt is a 69 y.o. male with past psychiatric hx of  relationship issues who presents for follow-up treatment.   LPC and PT engaged rapport building. Pt reported financial issues and work stressors.     LPC and PT discussed how a typical conversation about intimacy with his wife. Pt endorsed feeling like on eggshells. Pt discussed their personality differences.     LPC and PT discussed his behavioral issues with sex. Pt denied doing anything lately.     LPC and PT processed his sexual tendencies.  PT endorsed struggling with moral implication his sexual tendencies.    Pt processed results of SAST.     LPC and Pt discussed intimacy. PT described feeling uncomfortable. LPC provided PT with love languages page.     LPC suggested for Pt to look into SA groups. LPC provided Pt with resources.    LPC and Pt addressed goals of relationship issues and cope with stress.    Goals: work on relationship issues, behavioral issues, effective communication, cope with stress   Current symptoms:  Depression: denies.  Anxiety: excessive worrying and restlessness. Pt reported stress due to other outside issues.   Panic Attacks: n/a  Sleep: difficulty falling asleep. Pt reported going to bed late.   Flashbacks/nightmares: n/a  Daxa:  denies.  Psychosis: denies .  Hygiene: Pt denied issues.  Appetite: Pt reported hasn't been good in the past 6 months.  Motivation/Energy: Pt reported little better.   Sobriety: Pt reported no relapse.   Cravings: 7/10  Pt endorsed urges a few times   Relapses: Pt denied.  Triggers: At night, stress of legal issues, having to wait,   Recovery efforts:   Therapeutic Intervention/Techniques: behavior modification, insight oriented, and supportive; relevant to diagnosis, patient responds to this modality    Will continue to follow.   Pt aware to contact [facility] for any additional  needs that may occur prior to next session.    Risk Parameters:  Patient reports no suicidal ideation  Patient reports no homicidal ideation  Patient reports no self-injurious behavior  Patient reports no violent behavior    Diagnosis:   1. Adjustment disorder, unspecified type        2. Relationship problems            Return to Clinic: 2 weeks  Counseling time: 60  -Call to report any worsening of symptoms or problems associated with medication.  - Pt instructed to go to ER if thoughts of harming self or others arise.   -Supportive therapy and psychoeducation provided  -Pt instructed to call clinic, 911 or go to nearest emergency room if sxs worsen or pt is in crisis. The pt expresses understanding.   Each patient to whom he or she provides medical services by telemedicine is:  (1) informed of the relationship between the physician and patient and the respective role of any other health care provider with respect to management of the patient; and (2) notified that he or she may decline to receive medical services by telemedicine and may withdraw from such care at any time.

## 2024-10-23 ENCOUNTER — CLINICAL SUPPORT (OUTPATIENT)
Dept: PSYCHIATRY | Facility: CLINIC | Age: 69
End: 2024-10-23
Payer: MEDICARE

## 2024-10-23 DIAGNOSIS — Z63.9 RELATIONSHIP PROBLEMS: ICD-10-CM

## 2024-10-23 DIAGNOSIS — F43.20 ADJUSTMENT DISORDER, UNSPECIFIED TYPE: Primary | ICD-10-CM

## 2024-10-23 DIAGNOSIS — F41.9 ANXIETY DISORDER, UNSPECIFIED TYPE: ICD-10-CM

## 2024-10-23 PROCEDURE — 90837 PSYTX W PT 60 MINUTES: CPT | Mod: ,,, | Performed by: COUNSELOR

## 2024-10-23 SDOH — SOCIAL DETERMINANTS OF HEALTH (SDOH): PROBLEM RELATED TO PRIMARY SUPPORT GROUP, UNSPECIFIED: Z63.9

## 2024-10-24 NOTE — PROGRESS NOTES
Individual Psychotherapy (LPC)    11/8/2024  Interim Events/Subjective Report/Content of Current Session:  follow-up appointment.    Pt is a 69 y.o. male with past psychiatric hx of  relationship issues who presents for follow-up treatment.   LPC and PT engaged rapport building.     LPC and PT discussed how a typical conversation about intimacy with his wife.     LPC and PT discussed his behavioral issues with sex. Pt denied doing anything lately.     Pt discussed medications. Pt reported taking Trazodone. LPC and Pt discussed how Pt has been taking medications.     Pt explained getting a sleep study soon.     LPC and PT processed his sexual tendencies. Pt reported hasn't stayed on the [facility] since the house was sold.    LPC followed up on the use of the love languages page. Pt denied discussing with his wife.     LPC suggested for Pt to look into SA groups. LPC provided Pt with resources. LPC followed up on SA groups. PT reported didn't look at it.     LPC and Pt addressed goals of relationship issues and cope with stress.    Goals: work on relationship issues, behavioral issues, effective communication, cope with stress   Current symptoms:  Depression: denies.  Anxiety: denies.   Panic Attacks: n/a  Sleep:  Pt reported taking medication .  Flashbacks/nightmares: n/a  Daxa:  denies.  Psychosis: denies .  Hygiene: Pt denied issues.  Appetite: Pt reported hasn't been good in the past 6 months.  Motivation/Energy: Pt reported little better.   Sobriety: Pt reported no relapse.   Cravings: 4/10  Pt endorsed urges a few times   Relapses: Pt denied.  Triggers: n/a  Recovery efforts: Pt reported staying busy and watching educational stuff.  Therapeutic Intervention/Techniques: behavior modification, insight oriented, and supportive; relevant to diagnosis, patient responds to this modality    Will continue to follow.   Pt aware to contact LPC for any additional needs that may occur prior to next session.    Risk  Parameters:  Patient reports no suicidal ideation  Patient reports no homicidal ideation  Patient reports no self-injurious behavior  Patient reports no violent behavior    Diagnosis:   1. Adjustment disorder, unspecified type        2. Relationship problems              Return to Clinic: 2 weeks  Counseling time: 30  -Call to report any worsening of symptoms or problems associated with medication.  - Pt instructed to go to ER if thoughts of harming self or others arise.   -Supportive therapy and psychoeducation provided  -Pt instructed to call clinic, 911 or go to nearest emergency room if sxs worsen or pt is in crisis. The pt expresses understanding.   Each patient to whom he or she provides medical services by telemedicine is:  (1) informed of the relationship between the physician and patient and the respective role of any other health care provider with respect to management of the patient; and (2) notified that he or she may decline to receive medical services by telemedicine and may withdraw from such care at any time.

## 2024-10-29 ENCOUNTER — LAB VISIT (OUTPATIENT)
Dept: LAB | Facility: HOSPITAL | Age: 69
End: 2024-10-29
Attending: FAMILY MEDICINE
Payer: MEDICARE

## 2024-10-29 DIAGNOSIS — E78.5 HYPERLIPIDEMIA ASSOCIATED WITH TYPE 2 DIABETES MELLITUS: ICD-10-CM

## 2024-10-29 DIAGNOSIS — E11.8 DIABETES MELLITUS TYPE 2 WITH COMPLICATIONS: ICD-10-CM

## 2024-10-29 DIAGNOSIS — E11.69 HYPERLIPIDEMIA ASSOCIATED WITH TYPE 2 DIABETES MELLITUS: ICD-10-CM

## 2024-10-29 DIAGNOSIS — E11.59 HYPERTENSION ASSOCIATED WITH DIABETES: ICD-10-CM

## 2024-10-29 DIAGNOSIS — I15.2 HYPERTENSION ASSOCIATED WITH DIABETES: ICD-10-CM

## 2024-10-29 LAB
ALBUMIN SERPL BCP-MCNC: 4 G/DL (ref 3.5–5.2)
ALP SERPL-CCNC: 79 U/L (ref 40–150)
ALT SERPL W/O P-5'-P-CCNC: 31 U/L (ref 10–44)
ANION GAP SERPL CALC-SCNC: 7 MMOL/L (ref 8–16)
AST SERPL-CCNC: 24 U/L (ref 10–40)
BASOPHILS # BLD AUTO: 0.07 K/UL (ref 0–0.2)
BASOPHILS NFR BLD: 0.8 % (ref 0–1.9)
BILIRUB SERPL-MCNC: 0.6 MG/DL (ref 0.1–1)
BUN SERPL-MCNC: 14 MG/DL (ref 8–23)
CALCIUM SERPL-MCNC: 9.7 MG/DL (ref 8.7–10.5)
CHLORIDE SERPL-SCNC: 106 MMOL/L (ref 95–110)
CHOLEST SERPL-MCNC: 139 MG/DL (ref 120–199)
CHOLEST/HDLC SERPL: 3 {RATIO} (ref 2–5)
CO2 SERPL-SCNC: 28 MMOL/L (ref 23–29)
CREAT SERPL-MCNC: 0.8 MG/DL (ref 0.5–1.4)
DIFFERENTIAL METHOD BLD: ABNORMAL
EOSINOPHIL # BLD AUTO: 0.1 K/UL (ref 0–0.5)
EOSINOPHIL NFR BLD: 1.6 % (ref 0–8)
ERYTHROCYTE [DISTWIDTH] IN BLOOD BY AUTOMATED COUNT: 12.9 % (ref 11.5–14.5)
EST. GFR  (NO RACE VARIABLE): >60 ML/MIN/1.73 M^2
ESTIMATED AVG GLUCOSE: 134 MG/DL (ref 68–131)
GLUCOSE SERPL-MCNC: 131 MG/DL (ref 70–110)
HBA1C MFR BLD: 6.3 % (ref 4–5.6)
HCT VFR BLD AUTO: 46.4 % (ref 40–54)
HDLC SERPL-MCNC: 46 MG/DL (ref 40–75)
HDLC SERPL: 33.1 % (ref 20–50)
HGB BLD-MCNC: 15.6 G/DL (ref 14–18)
IMM GRANULOCYTES # BLD AUTO: 0.03 K/UL (ref 0–0.04)
IMM GRANULOCYTES NFR BLD AUTO: 0.3 % (ref 0–0.5)
LDLC SERPL CALC-MCNC: 71.4 MG/DL (ref 63–159)
LYMPHOCYTES # BLD AUTO: 2 K/UL (ref 1–4.8)
LYMPHOCYTES NFR BLD: 22.9 % (ref 18–48)
MCH RBC QN AUTO: 31.2 PG (ref 27–31)
MCHC RBC AUTO-ENTMCNC: 33.6 G/DL (ref 32–36)
MCV RBC AUTO: 93 FL (ref 82–98)
MONOCYTES # BLD AUTO: 0.7 K/UL (ref 0.3–1)
MONOCYTES NFR BLD: 8.1 % (ref 4–15)
NEUTROPHILS # BLD AUTO: 5.8 K/UL (ref 1.8–7.7)
NEUTROPHILS NFR BLD: 66.3 % (ref 38–73)
NONHDLC SERPL-MCNC: 93 MG/DL
NRBC BLD-RTO: 0 /100 WBC
PLATELET # BLD AUTO: 218 K/UL (ref 150–450)
PMV BLD AUTO: 9.9 FL (ref 9.2–12.9)
POTASSIUM SERPL-SCNC: 4.6 MMOL/L (ref 3.5–5.1)
PROT SERPL-MCNC: 7.2 G/DL (ref 6–8.4)
RBC # BLD AUTO: 5 M/UL (ref 4.6–6.2)
SODIUM SERPL-SCNC: 141 MMOL/L (ref 136–145)
TRIGL SERPL-MCNC: 108 MG/DL (ref 30–150)
WBC # BLD AUTO: 8.75 K/UL (ref 3.9–12.7)

## 2024-10-29 PROCEDURE — 85025 COMPLETE CBC W/AUTO DIFF WBC: CPT | Performed by: FAMILY MEDICINE

## 2024-10-29 PROCEDURE — 83036 HEMOGLOBIN GLYCOSYLATED A1C: CPT | Performed by: FAMILY MEDICINE

## 2024-10-29 PROCEDURE — 80061 LIPID PANEL: CPT | Performed by: FAMILY MEDICINE

## 2024-10-29 PROCEDURE — 36415 COLL VENOUS BLD VENIPUNCTURE: CPT | Mod: PO | Performed by: FAMILY MEDICINE

## 2024-10-29 PROCEDURE — 80053 COMPREHEN METABOLIC PANEL: CPT | Performed by: FAMILY MEDICINE

## 2024-10-30 DIAGNOSIS — N20.0 KIDNEY STONES: ICD-10-CM

## 2024-10-30 RX ORDER — ALLOPURINOL 300 MG/1
300 TABLET ORAL
Qty: 90 TABLET | Refills: 1 | Status: SHIPPED | OUTPATIENT
Start: 2024-10-30

## 2024-10-31 ENCOUNTER — OFFICE VISIT (OUTPATIENT)
Dept: FAMILY MEDICINE | Facility: CLINIC | Age: 69
End: 2024-10-31
Payer: MEDICARE

## 2024-10-31 VITALS
HEIGHT: 65 IN | OXYGEN SATURATION: 99 % | DIASTOLIC BLOOD PRESSURE: 78 MMHG | TEMPERATURE: 98 F | HEART RATE: 64 BPM | WEIGHT: 162.94 LBS | RESPIRATION RATE: 16 BRPM | SYSTOLIC BLOOD PRESSURE: 124 MMHG | BODY MASS INDEX: 27.15 KG/M2

## 2024-10-31 DIAGNOSIS — E79.0 HYPERURICEMIA: ICD-10-CM

## 2024-10-31 DIAGNOSIS — E11.8 DIABETES MELLITUS TYPE 2 WITH COMPLICATIONS: Primary | ICD-10-CM

## 2024-10-31 DIAGNOSIS — I10 HYPERTENSION, ESSENTIAL: ICD-10-CM

## 2024-10-31 DIAGNOSIS — E78.5 HYPERLIPIDEMIA, UNSPECIFIED HYPERLIPIDEMIA TYPE: ICD-10-CM

## 2024-10-31 DIAGNOSIS — G47.33 OSA (OBSTRUCTIVE SLEEP APNEA): ICD-10-CM

## 2024-10-31 PROCEDURE — G2211 COMPLEX E/M VISIT ADD ON: HCPCS | Mod: S$GLB,,, | Performed by: FAMILY MEDICINE

## 2024-10-31 PROCEDURE — 99214 OFFICE O/P EST MOD 30 MIN: CPT | Mod: S$GLB,,, | Performed by: FAMILY MEDICINE

## 2024-10-31 RX ORDER — HYDROCODONE BITARTRATE AND ACETAMINOPHEN 5; 325 MG/1; MG/1
TABLET ORAL
COMMUNITY
Start: 2024-10-24 | End: 2024-10-31

## 2024-10-31 RX ORDER — TRAZODONE HYDROCHLORIDE 50 MG/1
50 TABLET ORAL NIGHTLY
Qty: 30 TABLET | Refills: 1 | Status: SHIPPED | OUTPATIENT
Start: 2024-10-31 | End: 2025-10-31

## 2024-11-08 ENCOUNTER — CLINICAL SUPPORT (OUTPATIENT)
Dept: PSYCHIATRY | Facility: CLINIC | Age: 69
End: 2024-11-08
Payer: MEDICARE

## 2024-11-08 DIAGNOSIS — Z63.9 RELATIONSHIP PROBLEMS: ICD-10-CM

## 2024-11-08 DIAGNOSIS — F43.20 ADJUSTMENT DISORDER, UNSPECIFIED TYPE: Primary | ICD-10-CM

## 2024-11-08 SDOH — SOCIAL DETERMINANTS OF HEALTH (SDOH): PROBLEM RELATED TO PRIMARY SUPPORT GROUP, UNSPECIFIED: Z63.9

## 2024-11-08 NOTE — PROGRESS NOTES
Individual Psychotherapy (LPC)    11/21/2024  Interim Events/Subjective Report/Content of Current Session:  follow-up appointment.    Pt is a 69 y.o. male with past psychiatric hx of  relationship issues who presents for follow-up treatment.   LPC and PT engaged rapport building. Pt discussed work stress.     Pt endorsed stressors of having so many doctors.     LPC and PT discussed how a typical conversation about intimacy with his wife. Pt reported being alone together. Pt discussed division of labor at home. Pt reported watch football.      LPC and PT discussed his behavioral issues with sex. Pt denied doing anything lately.     Pt discussed regrets of being with his wife.     Pt processed mortality of his life. Pt identified not wanting to be a burden. Pt stated wants to keep having quality of life. Pt expressed fears.     Pt discussed medications.     LPC provided Pt with a worksheet on healthy versus unhealthy relationships.     Pt reported sleep study. Pt explained he struggled to get any sleep.     LPC and PT processed his sexual tendencies.     LPC suggested for Pt to look into SA groups. LPC provided Pt with resources. LPC followed up on SA groups. PT reported not interested with groups.     LPC and Pt addressed goals of relationship issues and cope with stress.    Goals: work on relationship issues, behavioral issues, effective communication, cope with stress   Current symptoms:  Depression: denies.  Anxiety: denies.   Panic Attacks: n/a  Sleep:  Pt reported taking medication .  Flashbacks/nightmares: n/a  Daxa:  denies.  Psychosis: denies .  Hygiene: Pt denied issues.  Appetite: Pt reported hasn't been good in the past 6 months.  Motivation/Energy: Pt reported little better.   Sobriety: Pt reported no relapse.   Cravings: 4/10  Pt endorsed urges a few times -Pt reported eating more sugar in ir.   Relapses: Pt denied.  Triggers: n/a  Recovery efforts: Pt reported staying busy, work, appointments, and  watching educational stuff.  Therapeutic Intervention/Techniques: behavior modification, insight oriented, and supportive; relevant to diagnosis, patient responds to this modality    Will continue to follow.   Pt aware to contact LPC for any additional needs that may occur prior to next session.    Risk Parameters:  Patient reports no suicidal ideation  Patient reports no homicidal ideation  Patient reports no self-injurious behavior  Patient reports no violent behavior    Diagnosis:   1. Adjustment disorder, unspecified type                Return to Clinic: 2 weeks  Counseling time: 30  -Call to report any worsening of symptoms or problems associated with medication.  - Pt instructed to go to ER if thoughts of harming self or others arise.   -Supportive therapy and psychoeducation provided  -Pt instructed to call clinic, 911 or go to nearest emergency room if sxs worsen or pt is in crisis. The pt expresses understanding.   Each patient to whom he or she provides medical services by telemedicine is:  (1) informed of the relationship between the physician and patient and the respective role of any other health care provider with respect to management of the patient; and (2) notified that he or she may decline to receive medical services by telemedicine and may withdraw from such care at any time.

## 2024-11-11 ENCOUNTER — TELEPHONE (OUTPATIENT)
Dept: FAMILY MEDICINE | Facility: CLINIC | Age: 69
End: 2024-11-11
Payer: MEDICARE

## 2024-11-11 NOTE — TELEPHONE ENCOUNTER
----- Message from Charlotte sent at 11/11/2024  2:40 PM CST -----  Contact: walk in  Pt dropped off proof of his flu shot he received from his pharmacy to put in his chart.  In box.

## 2024-11-19 ENCOUNTER — PATIENT MESSAGE (OUTPATIENT)
Dept: FAMILY MEDICINE | Facility: CLINIC | Age: 69
End: 2024-11-19
Payer: MEDICARE

## 2024-11-21 ENCOUNTER — CLINICAL SUPPORT (OUTPATIENT)
Dept: PSYCHIATRY | Facility: CLINIC | Age: 69
End: 2024-11-21
Payer: MEDICARE

## 2024-11-21 DIAGNOSIS — F43.20 ADJUSTMENT DISORDER, UNSPECIFIED TYPE: Primary | ICD-10-CM

## 2024-11-22 NOTE — PROGRESS NOTES
Individual Psychotherapy (LPC)    12/12/2024  Interim Events/Subjective Report/Content of Current Session:  follow-up appointment.    Pt is a 69 y.o. male with past psychiatric hx of  relationship issues who presents for follow-up treatment.  Pt discussed upcoming holidays.     LPC and PT discussed how a typical conversation about intimacy with his wife. Pt reported picking up food frequency. Pt explained went out to dinner a few months ago.     LPC and PT discussed Thanksgiving. Pt reported went to his daughters home. Pt explained brought food over.     LPC and PT discussed his behavioral issues with sex. Pt reported still engaging in some behaviors.     Pt processed regrets of being with his wife. Pt endorsed feelings about getting back together. Pt reported feeling bad for his wife. Pt stated convient to be together.     Pt processed mortality of his life. Pt identified not wanting to be a burden. Pt stated wants to keep having quality of life. Pt expressed fears.     Pt discussed medical issues. Pt endorsed having stomach issues and eye surgery.       Pt discussed his relationship with his mom. Pt reported had a great relationship with his mother. Pt shared stories about his mother. Pt endorsed mother came first. Pt shared how his mother always supported him.     Pt discussed stressors of work around the holidays. Pt stated having more issues with his apartments.     LPC and Pt addressed goals of relationship issues and cope with stress.    Goals: work on relationship issues, behavioral issues, effective communication, cope with stress   Current symptoms:  Depression: denies.  Anxiety: denies.   Panic Attacks: n/a  Sleep:  Pt reported taking medication .  Flashbacks/nightmares: n/a  Daxa:  denies.  Psychosis: denies .  Hygiene: Pt denied issues.  Appetite: Pt reported hasn't been good in the past 6 months.  Motivation/Energy: Pt reported better.   Sobriety: Pt reported no relapse. Pt reported still watch porn,  however hasn't hooked up anyone.   Cravings: 4/10  Pt endorsed urges a few times. Pt reported eating more sugar.   Relapses: Pt denied.  Triggers: n/a  Recovery efforts: Pt reported staying busy, work, appointments, and watching educational stuff.  Therapeutic Intervention/Techniques: behavior modification, insight oriented, and supportive; relevant to diagnosis, patient responds to this modality    Will continue to follow.   Pt aware to contact LPC for any additional needs that may occur prior to next session.    Risk Parameters:  Patient reports no suicidal ideation  Patient reports no homicidal ideation  Patient reports no self-injurious behavior  Patient reports no violent behavior    Diagnosis:   1. Adjustment disorder, unspecified type              Return to Clinic: 3 weeks  Counseling time: 45  -Call to report any worsening of symptoms or problems associated with medication.  - Pt instructed to go to ER if thoughts of harming self or others arise.   -Supportive therapy and psychoeducation provided  -Pt instructed to call clinic, 911 or go to nearest emergency room if sxs worsen or pt is in crisis. The pt expresses understanding.   Each patient to whom he or she provides medical services by telemedicine is:  (1) informed of the relationship between the physician and patient and the respective role of any other health care provider with respect to management of the patient; and (2) notified that he or she may decline to receive medical services by telemedicine and may withdraw from such care at any time.

## 2024-12-03 LAB
LEFT EYE DM RETINOPATHY: NEGATIVE
RIGHT EYE DM RETINOPATHY: NEGATIVE

## 2024-12-12 ENCOUNTER — CLINICAL SUPPORT (OUTPATIENT)
Dept: PSYCHIATRY | Facility: CLINIC | Age: 69
End: 2024-12-12
Payer: MEDICARE

## 2024-12-12 DIAGNOSIS — F43.20 ADJUSTMENT DISORDER, UNSPECIFIED TYPE: Primary | ICD-10-CM

## 2024-12-16 RX ORDER — EMPAGLIFLOZIN 10 MG/1
10 TABLET, FILM COATED ORAL
Qty: 90 TABLET | Refills: 1 | Status: SHIPPED | OUTPATIENT
Start: 2024-12-16

## 2024-12-16 NOTE — TELEPHONE ENCOUNTER
No care due was identified.  [facility] Embedded Care Due Messages. Reference number: [id number].   12/16/2024 2:32:19 AM CST

## 2024-12-17 NOTE — PROGRESS NOTES
Individual Psychotherapy (LPC)    01/07/2025  Interim Events/Subjective Report/Content of Current Session:  follow-up appointment.    Pt is a 69 y.o. male with past psychiatric hx of  relationship issues who presents for follow-up treatment.    LPC and Pt discussed intimacy with his wife. Pt reported quiet, however avoiding. Pt endorsed frustrations with her. Pt denied caring to be intimate or want to deepen their relationship. Pt reported feeling like a burden. Pt endorsed frustrations with lack of affection from his wife. Pt endorsed resentments.     LPC and PT discussed the holidays.     LPC and PT discussed his behavioral issues with sex. Pt reported still engaging in some behaviors. Pt reported doing puzzles lately.    Pt processed his life. Pt reported not meeting up with friends enough.     Pt discussed medical issues.     LPC and Pt discussed sex/intimacy questionnaire. LPC suggested for Pt to ask his wife a few questions.     Pt stated having more issues with his apartments. Pt endorsed keeps him active. Pt denied wanting to ask about sexual intimacy.     LPC and Pt addressed goals of relationship issues and cope with stress.    Goals: work on relationship issues, behavioral issues, effective communication, cope with stress   Current symptoms:  Depression: denies.  Anxiety: denies.   Panic Attacks: n/a  Sleep:  Pt reported taking medication .  Flashbacks/nightmares: n/a  Daxa:  denies.  Psychosis: denies .  Hygiene: Pt denied issues.  Appetite: Pt reported hasn't been good in the past 6 months.  Motivation/Energy: Pt reported better.   Sobriety: Pt reported no relapse. Pt reported still watch porn, however hasn't hooked up anyone.   Cravings: 4/10  Pt endorsed urges a few times. Pt reported eating more sugar.   Relapses: Pt denied.  Triggers: n/a  Recovery efforts: Pt reported staying busy, work, appointments, and watching educational stuff.  Therapeutic Intervention/Techniques: behavior modification,  insight oriented, and supportive; relevant to diagnosis, patient responds to this modality    Will continue to follow.   Pt aware to contact LPC for any additional needs that may occur prior to next session.    Risk Parameters:  Patient reports no suicidal ideation  Patient reports no homicidal ideation  Patient reports no self-injurious behavior  Patient reports no violent behavior    Diagnosis:   1. Adjustment disorder, unspecified type            Return to Clinic: 2 weeks  Counseling time: 45  -Call to report any worsening of symptoms or problems associated with medication.  - Pt instructed to go to ER if thoughts of harming self or others arise.   -Supportive therapy and psychoeducation provided  -Pt instructed to call clinic, 911 or go to nearest emergency room if sxs worsen or pt is in crisis. The pt expresses understanding.   Each patient to whom he or she provides medical services by telemedicine is:  (1) informed of the relationship between the physician and patient and the respective role of any other health care provider with respect to management of the patient; and (2) notified that he or she may decline to receive medical services by telemedicine and may withdraw from such care at any time.      none

## 2024-12-17 NOTE — TELEPHONE ENCOUNTER
Refill Decision Note   Trevor Vickie  is requesting a refill authorization.  Brief Assessment and Rationale for Refill:  Approve     Medication Therapy Plan:        Pharmacist review requested: Yes   Comments:     Note composed:8:34 PM 12/16/2024

## 2024-12-17 NOTE — TELEPHONE ENCOUNTER
Refill Routing Note   Medication(s) are not appropriate for processing by [facility] for the following reason(s):        Drug-disease interaction    [facility] action(s):  Defer        Medication Therapy Plan: Drug-Disease: JARDIANCE and Hypertension associated with diabetes    Pharmacist review requested: Yes     Appointments  past 12m or future 3m with PCP    Date Provider   Last Visit   10/31/2024 Blanca Doe MD   Next Visit   5/12/2025 Blanca Doe MD   ED visits in past 90 days: 0        Note composed:8:27 PM 12/16/2024

## 2024-12-20 ENCOUNTER — PATIENT OUTREACH (OUTPATIENT)
Dept: ADMINISTRATIVE | Facility: HOSPITAL | Age: 69
End: 2024-12-20
Payer: MEDICARE

## 2025-01-05 DIAGNOSIS — E11.69 TYPE 2 DIABETES MELLITUS WITH OTHER SPECIFIED COMPLICATION, WITHOUT LONG-TERM CURRENT USE OF INSULIN: ICD-10-CM

## 2025-01-05 NOTE — TELEPHONE ENCOUNTER
No care due was identified.  [facility] Embedded Care Due Messages. Reference number: [id number].   1/05/2025 11:39:55 AM CST

## 2025-01-06 RX ORDER — METFORMIN HYDROCHLORIDE 500 MG/1
TABLET, EXTENDED RELEASE ORAL
Qty: 270 TABLET | Refills: 1 | Status: SHIPPED | OUTPATIENT
Start: 2025-01-06

## 2025-01-06 NOTE — TELEPHONE ENCOUNTER
Refill Routing Note   Medication(s) are not appropriate for processing by [facility] for the following reason(s):        Drug-disease interactionmetFORMIN and Obstructed, uropathy; Obstructive uropathy     [facility] action(s):  Defer             Pharmacist review requested: Yes     Appointments  past 12m or future 3m with PCP    Date Provider   Last Visit   10/31/2024 Blanca Doe MD   Next Visit   5/12/2025 Blanca Doe MD   ED visits in past 90 days: 0        Note composed:1:24 PM 01/06/2025

## 2025-01-06 NOTE — TELEPHONE ENCOUNTER
Refill Decision Note   Trevor Johnston  is requesting a refill authorization.  Brief Assessment and Rationale for Refill:  Approve     Medication Therapy Plan:         Pharmacist review requested: Yes   Comments:     Note composed:1:44 PM 01/06/2025

## 2025-01-07 ENCOUNTER — CLINICAL SUPPORT (OUTPATIENT)
Dept: PSYCHIATRY | Facility: CLINIC | Age: 70
End: 2025-01-07
Payer: MEDICARE

## 2025-01-07 DIAGNOSIS — F43.20 ADJUSTMENT DISORDER, UNSPECIFIED TYPE: Primary | ICD-10-CM

## 2025-01-07 PROCEDURE — 90834 PSYTX W PT 45 MINUTES: CPT | Mod: ,,, | Performed by: COUNSELOR

## 2025-01-13 DIAGNOSIS — Z00.00 ENCOUNTER FOR MEDICARE ANNUAL WELLNESS EXAM: ICD-10-CM

## 2025-01-22 ENCOUNTER — TELEPHONE (OUTPATIENT)
Dept: PSYCHIATRY | Facility: CLINIC | Age: 70
End: 2025-01-22
Payer: MEDICARE

## 2025-01-28 ENCOUNTER — TELEPHONE (OUTPATIENT)
Dept: PSYCHIATRY | Facility: CLINIC | Age: 70
End: 2025-01-28
Payer: MEDICARE

## 2025-01-28 NOTE — TELEPHONE ENCOUNTER
Called patient to make a follow up appointment with Aixa. Patient unable to answer left voice mail.

## 2025-02-18 NOTE — PROGRESS NOTES
Individual Psychotherapy (LPC)    03/6/2025    Interim Events/Subjective Report/Content of Current Session:  follow-up appointment.    Pt is a 69 y.o. male with past psychiatric hx of  relationship issues who presents for follow-up treatment.  Pt reported things have been up and down.     LPC and Pt discussed intimacy with his wife. Pt reported got in an argument. Pt reported wife was angry about leaving the kids alone. Pt explained wife was silent for 5 days. Pt reported bringing up splitting up about 6 months ago. Pt processed not wanting to change.     Pt discussed lack of sex drive. Pt reported nothing has change. Pt explained placing blame on his wife. Pt denied owning his part. Pt explained everything is separate.     LPC and PT discussed his behavioral issues with sex. Pt reported still engaging in some behaviors. Pt reported doing puzzles lately.    Pt processed his life. Pt reported dissatisfied.     Pt discussed medical issues. Pt processed anxiety and sleep issues. Pt reported getting a c-pap machine. PT discussed concerns sleep routine.     LPC and Pt discussed sex/intimacy questionnaire. LPC suggested for Pt to ask his wife a few questions. LPC followed up. Pt denied.     LPC and Pt addressed goals of relationship issues and cope with stress.    Goals: work on relationship issues, behavioral issues, effective communication, cope with stress   Current symptoms:  Depression: denies.  Anxiety: denies.   Panic Attacks: n/a  Sleep:  Pt reported taking medication .  Flashbacks/nightmares: n/a  Daxa:  denies.  Psychosis: denies .  Hygiene: Pt denied issues.  Appetite: Pt reported hasn't been good in the past 6 months.  Motivation/Energy: Pt reported better.   Sobriety: Pt reported no relapse. Pt reported still watch porn, however hasn't hooked up anyone.   Cravings: 4/10  Pt endorsed urges a few times. Pt reported eating more sugar.   Relapses: Pt denied.  Triggers: n/a  Recovery efforts: Pt reported  staying busy, work, appointments, and watching educational stuff.  Therapeutic Intervention/Techniques: behavior modification, insight oriented, and supportive; relevant to diagnosis, patient responds to this modality    Will continue to follow.   Pt aware to contact LPC for any additional needs that may occur prior to next session.    Risk Parameters:  Patient reports no suicidal ideation  Patient reports no homicidal ideation  Patient reports no self-injurious behavior  Patient reports no violent behavior    Diagnosis:   1. Adjustment disorder, unspecified type            Return to Clinic: 2 weeks  Counseling time: 45  -Call to report any worsening of symptoms or problems associated with medication.  - Pt instructed to go to ER if thoughts of harming self or others arise.   -Supportive therapy and psychoeducation provided  -Pt instructed to call clinic, 911 or go to nearest emergency room if sxs worsen or pt is in crisis. The pt expresses understanding.   Each patient to whom he or she provides medical services by telemedicine is:  (1) informed of the relationship between the physician and patient and the respective role of any other health care provider with respect to management of the patient; and (2) notified that he or she may decline to receive medical services by telemedicine and may withdraw from such care at any time.

## 2025-03-06 ENCOUNTER — CLINICAL SUPPORT (OUTPATIENT)
Dept: PSYCHIATRY | Facility: CLINIC | Age: 70
End: 2025-03-06
Payer: MEDICARE

## 2025-03-06 DIAGNOSIS — F43.20 ADJUSTMENT DISORDER, UNSPECIFIED TYPE: Primary | ICD-10-CM

## 2025-03-07 NOTE — PROGRESS NOTES
Individual Psychotherapy (LPC)    03/20/2025    Interim Events/Subjective Report/Content of Current Session:  follow-up appointment.    Pt is a 69 y.o. male with past psychiatric hx of  relationship issues who presents for follow-up treatment.    Pt discussed employment. Pt expressed frustrations with insurance companies.     LPC and Pt discussed lack of intimacy with his wife. Pt explained his wife getting testing done.     Pt discussed lack of sex drive.     Pt discussed childhood.     Pt processed getting older. Pt endorsed taking his time now to do things.     Pt processed his life. LPC and Pt discussed things he does for enjoyment. Pt reported seeing grandson's soccer games and getting seafood. Pt explained cooks every Friday for lent.      Pt discussed medical issues. Pt processed anxiety and sleep issues. Pt reported getting a c-pap machine. PT discussed concerns sleep routine. Pt reported starting to getting used to it. Pt endorsed a little better quality sleep.     LPC and Pt addressed goals of relationship issues and cope with stress.    Goals: work on relationship issues, behavioral issues, effective communication, cope with stress   Current symptoms:  Depression: denies.  Anxiety: denies.   Panic Attacks: n/a  Sleep:  Pt reported taking medication .  Flashbacks/nightmares: n/a  Daxa:  denies.  Psychosis: denies .  Hygiene: Pt denied issues.  Appetite: Pt reported hasn't been good in the past 6 months.  Motivation/Energy: Pt reported better.   Sobriety: Pt reported no relapse. Pt reported still watch porn, however hasn't hooked up anyone.   Cravings: 4/10  Pt endorsed urges a few times. Pt reported eating more sugar.   Relapses: Pt denied.  Triggers: n/a  Recovery efforts: Pt reported staying busy, work, appointments, and watching educational stuff.  Therapeutic Intervention/Techniques: behavior modification, insight oriented, and supportive; relevant to diagnosis, patient responds to this  modality    Will continue to follow.   Pt aware to contact LPC for any additional needs that may occur prior to next session.    Risk Parameters:  Patient reports no suicidal ideation  Patient reports no homicidal ideation  Patient reports no self-injurious behavior  Patient reports no violent behavior    Diagnosis:   1. Adjustment disorder, unspecified type          Return to Clinic: 3 weeks  Counseling time: 45  -Call to report any worsening of symptoms or problems associated with medication.  - Pt instructed to go to ER if thoughts of harming self or others arise.   -Supportive therapy and psychoeducation provided  -Pt instructed to call clinic, 911 or go to nearest emergency room if sxs worsen or pt is in crisis. The pt expresses understanding.   Each patient to whom he or she provides medical services by telemedicine is:  (1) informed of the relationship between the physician and patient and the respective role of any other health care provider with respect to management of the patient; and (2) notified that he or she may decline to receive medical services by telemedicine and may withdraw from such care at any time.

## 2025-03-13 DIAGNOSIS — E11.69 TYPE 2 DIABETES MELLITUS WITH OTHER SPECIFIED COMPLICATION, WITHOUT LONG-TERM CURRENT USE OF INSULIN: ICD-10-CM

## 2025-03-13 RX ORDER — METFORMIN HYDROCHLORIDE 500 MG/1
TABLET, EXTENDED RELEASE ORAL
Qty: 270 TABLET | Refills: 0 | Status: SHIPPED | OUTPATIENT
Start: 2025-03-13

## 2025-03-13 RX ORDER — METFORMIN HYDROCHLORIDE 500 MG/1
TABLET, EXTENDED RELEASE ORAL
Refills: 0 | OUTPATIENT
Start: 2025-03-13

## 2025-03-13 NOTE — TELEPHONE ENCOUNTER
Refill Decision Note   Trevor Johnston  is requesting a refill authorization.  Brief Assessment and Rationale for Refill:  Approve     Medication Therapy Plan:  Approved due to pharmacy change       Pharmacist review requested: Yes   Extended chart review required: Yes   Comments:     Note composed:5:53 PM 03/13/2025

## 2025-03-13 NOTE — TELEPHONE ENCOUNTER
No care due was identified.  [facility] Embedded Care Due Messages. Reference number: [id number].   3/13/2025 3:56:08 PM CDT

## 2025-03-13 NOTE — TELEPHONE ENCOUNTER
Care Due:                  Date            Visit Type   Department     Provider  --------------------------------------------------------------------------------                                EP -                              PRIMARY      [facility] Blanca Doe  Last Visit: 10-      CARE ([facility])   MEDICINE       Anger                              EP -                              PRIMARY      [facility]    Blanca Doe  Next Visit: 05-      CARE ([facility])   MEDICINE       Anger                                                            Last  Test          Frequency    Reason                     Performed    Due Date  --------------------------------------------------------------------------------    HBA1C.......  6 months...  JARDIANCE, metFORMIN.....  10-   04-    Uric Acid...  12 months..  allopurinoL..............  04- 04-    Health [facility] Embedded Care Due Messages. Reference number: [id number].   3/13/2025 2:49:02 PM CDT

## 2025-03-13 NOTE — TELEPHONE ENCOUNTER
Refill Decision Note   Trevor Johnston  is requesting a refill authorization.    Brief Assessment and Rationale for Refill:   Quick Discontinue       Medication Therapy Plan:   FLOS; Medications refused due to error in request and pended manually.      Comments:     Note composed:3:53 PM 03/13/2025

## 2025-03-13 NOTE — TELEPHONE ENCOUNTER
Refill Routing Note   Medication(s) are not appropriate for processing by [facility] for the following reason(s):        Drug-disease interaction    [facility] action(s):  Defer        Medication Therapy Plan: Drug-Disease: metFORMIN and Obstructed, uropathy; Obstructive uropathy    Pharmacist review requested: Yes     Appointments  past 12m or future 3m with PCP    Date Provider   Last Visit   10/31/2024 Blanca Doe MD   Next Visit   5/12/2025 Blanca Doe MD   ED visits in past 90 days: 0        Note composed:5:37 PM 03/13/2025

## 2025-03-20 ENCOUNTER — CLINICAL SUPPORT (OUTPATIENT)
Dept: PSYCHIATRY | Facility: CLINIC | Age: 70
End: 2025-03-20
Payer: MEDICARE

## 2025-03-20 DIAGNOSIS — F43.20 ADJUSTMENT DISORDER, UNSPECIFIED TYPE: Primary | ICD-10-CM

## 2025-03-20 PROCEDURE — 90834 PSYTX W PT 45 MINUTES: CPT | Mod: ,,, | Performed by: COUNSELOR

## 2025-03-25 NOTE — PROGRESS NOTES
Individual Psychotherapy (LPC)    04/10/2025    Interim Events/Subjective Report/Content of Current Session:  follow-up appointment.    Pt is a 69 y.o. male with past psychiatric hx of  relationship issues who presents for follow-up treatment.  Pt reported brother in town. Pt explained spending time together, and ate food together.     Pt discussed stressors. Pt explained money issues and legal issues. Pt reported a lot of issues are uncertainty/unresolved and anxiety. Pt explained liking to have things complete. PT stated many tasks with his home. Pt processed after his daughter was born.     Pt discussed employment. Pt reported going well. 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     LPC and Pt discussed lack of intimacy with his wife. Pt explained doing okay.  Pt reported feeling like he can't go on vacation. Pt processed intermediate. Pt denied going on vacation in 8 years.     Pt discussed lack of sex drive. Did not discuss.    Pt discussed childhood. Did not discuss    Pt processed getting older. Did not discuss    Pt processed his life. LPC and Pt discussed things he does for enjoyment.Pt explained going to grandparents day. Pt reported excitement about an upcoming wedding.     Pt processed an invention idea. Pt endorsed frustrations with time management.     Pt discussed medical issues. Pt reported having lazier surgery on his eye.     Goals: work on relationship issues, behavioral issues, effective communication, cope with stress   Current symptoms:  Depression: denies.  Anxiety: denies.   Panic Attacks: n/a  Sleep:  Pt reported taking medication .Pt reported sleep has been.   Flashbacks/nightmares: n/a  Daxa:  denies.  Psychosis: denies .  Hygiene: Pt denied issues.  Appetite: Pt reported hasn't been good in the past 6 months.  Motivation/Energy: Pt reported better.   Sobriety: Pt reported no relapse. Pt reported still watch porn, however hasn't hooked up anyone.   Cravings: 4/10  Pt endorsed  urges a few times. Pt reported eating more sugar.   Relapses: Pt denied.  Triggers: n/a  Recovery efforts: Pt reported staying busy, work, appointments, and watching educational stuff.  Therapeutic Intervention/Techniques: behavior modification, insight oriented, and supportive; relevant to diagnosis, patient responds to this modality    Will continue to follow.   Pt aware to contact LPC for any additional needs that may occur prior to next session.    Risk Parameters:  Patient reports no suicidal ideation  Patient reports no homicidal ideation  Patient reports no self-injurious behavior  Patient reports no violent behavior    Diagnosis:   1. Adjustment disorder, unspecified type            Return to Clinic: 2 weeks  Counseling time: 45  -Call to report any worsening of symptoms or problems associated with medication.  - Pt instructed to go to ER if thoughts of harming self or others arise.   -Supportive therapy and psychoeducation provided  -Pt instructed to call clinic, 911 or go to nearest emergency room if sxs worsen or pt is in crisis. The pt expresses understanding.   Each patient to whom he or she provides medical services by telemedicine is:  (1) informed of the relationship between the physician and patient and the respective role of any other health care provider with respect to management of the patient; and (2) notified that he or she may decline to receive medical services by telemedicine and may withdraw from such care at any time.

## 2025-04-02 DIAGNOSIS — E11.8 DIABETES MELLITUS TYPE 2 WITH COMPLICATIONS: Primary | ICD-10-CM

## 2025-04-02 RX ORDER — SILDENAFIL 100 MG/1
100 TABLET, FILM COATED ORAL DAILY PRN
Qty: 10 TABLET | Refills: 5 | Status: SHIPPED | OUTPATIENT
Start: 2025-04-02

## 2025-04-02 NOTE — TELEPHONE ENCOUNTER
Care Due:                  Date            Visit Type   Department     Provider  --------------------------------------------------------------------------------                                EP -                              PRIMARY      [facility]    Blanca Atkinsonyosef  Last Visit: 10-      CARE ([facility])   MEDICINE       Anger                              EP -                              PRIMARY      [facility]    Blanca Atkinsonyosef  Next Visit: 05-      CARE ([facility])   MEDICINE       Anger                                                            Last  Test          Frequency    Reason                     Performed    Due Date  --------------------------------------------------------------------------------    eGFR........  12 months..  JARDIANCE................  Not Found    Overdue    [employer] Embedded Care Due Messages. Reference number: [id number].   4/02/2025 5:53:49 PM CDT

## 2025-04-03 NOTE — TELEPHONE ENCOUNTER
Refill Routing Note   Medication(s) are not appropriate for processing by [facility] for the following reason(s):        Drug-disease interaction: sildenafiL and Other specified cardiac arrhythmias     [facility] action(s):  Defer     Requires labs : Yes           Pharmacist review requested: Yes     Appointments  past 12m or future 3m with PCP    Date Provider   Last Visit   10/31/2024 Blanca Doe MD   Next Visit   5/12/2025 Blanca Doe MD   ED visits in past 90 days: 0        Note composed:7:43 PM 04/02/2025

## 2025-04-03 NOTE — TELEPHONE ENCOUNTER
Provider Staff:  Action required for this patient     Please see care gap opportunities below in Care Due Message.    Thanks!  [facility]     Appointments      Date Provider   Last Visit   10/31/2024 Blanca Doe MD   Next Visit   5/12/2025 Blanca Doe MD      Refill Decision Note   Trevor Johnston  is requesting a refill authorization.  Brief Assessment and Rationale for Refill:  Approve     Medication Therapy Plan:         Pharmacist review requested: Yes   Extended chart review required: Yes   Comments:     Note composed:10:04 PM 04/02/2025

## 2025-04-10 ENCOUNTER — CLINICAL SUPPORT (OUTPATIENT)
Dept: PSYCHIATRY | Facility: CLINIC | Age: 70
End: 2025-04-10
Payer: MEDICARE

## 2025-04-10 DIAGNOSIS — F43.20 ADJUSTMENT DISORDER, UNSPECIFIED TYPE: Primary | ICD-10-CM

## 2025-04-10 PROCEDURE — 90834 PSYTX W PT 45 MINUTES: CPT | Mod: ,,, | Performed by: COUNSELOR

## 2025-04-14 NOTE — PROGRESS NOTES
Individual Psychotherapy (LPC)    04/24/2025    Interim Events/Subjective Report/Content of Current Session:  follow-up appointment.    Pt is a 69 y.o. male with past psychiatric hx of  relationship issues who presents for follow-up treatment.  Pt discussed Easter. Pt expressed going to a crawfish boil.     Pt discussed stressors. Pt reported getting better news about financial issues.     Pt reported spending more time outside and making burn pit.    LPC and Pt discussed lack of intimacy with his wife. Pt discussed issues. Pt reported feeling more irritated with her lately. Pt explained feeling like she's creating arguments. Pt reported staying outside to be away from her.     Pt further processed half-way expectations.  Pt explained fishing and traveling. Pt stated going fishing this coming weekend. Pt reported 6 months ago.     Pt reported sleep issues lately.     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     Pt processed getting older. Did not discuss.     Pt processed his life. Pt expressed frustrations with his life. Pt stated finding himself going to many appointments to keep up with his health.     Pt processed an invention idea. Did not discuss.     Pt discussed medical issues. Pt denied any recent issue changes.     Goals: work on relationship issues, behavioral issues, effective communication, cope with stress   Current symptoms:  Depression: denies.  Anxiety: denies.   Panic Attacks: n/a  Sleep:  Pt reported taking medication .  Flashbacks/nightmares: n/a  Daxa:  denies.  Psychosis: denies .  Hygiene: Pt denied issues.  Appetite: Pt reported hasn't been good in the past 6 months.  Motivation/Energy: Pt reported better.   Sobriety: Pt reported no relapse. Pt reported still watch porn, however hasn't hooked up anyone.   Cravings: 4/10  Pt endorsed urges a few times. Pt reported eating more sugar.   Relapses: Pt denied.  Triggers:  n/a  Recovery efforts: Pt reported staying busy, work, appointments, and watching educational stuff.  Therapeutic Intervention/Techniques: behavior modification, insight oriented, and supportive; relevant to diagnosis, patient responds to this modality    Will continue to follow.   Pt aware to contact LPC for any additional needs that may occur prior to next session.    Risk Parameters:  Patient reports no suicidal ideation  Patient reports no homicidal ideation  Patient reports no self-injurious behavior  Patient reports no violent behavior    Diagnosis:   1. Adjustment disorder, unspecified type              Return to Clinic: 3 weeks  Counseling time: 45  -Call to report any worsening of symptoms or problems associated with medication.  - Pt instructed to go to ER if thoughts of harming self or others arise.   -Supportive therapy and psychoeducation provided  -Pt instructed to call clinic, 911 or go to nearest emergency room if sxs worsen or pt is in crisis. The pt expresses understanding.   Each patient to whom he or she provides medical services by telemedicine is:  (1) informed of the relationship between the physician and patient and the respective role of any other health care provider with respect to management of the patient; and (2) notified that he or she may decline to receive medical services by telemedicine and may withdraw from such care at any time.

## 2025-04-24 ENCOUNTER — CLINICAL SUPPORT (OUTPATIENT)
Dept: PSYCHIATRY | Facility: CLINIC | Age: 70
End: 2025-04-24
Payer: MEDICARE

## 2025-04-24 DIAGNOSIS — F43.20 ADJUSTMENT DISORDER, UNSPECIFIED TYPE: Primary | ICD-10-CM

## 2025-04-24 PROCEDURE — 90834 PSYTX W PT 45 MINUTES: CPT | Mod: ,,, | Performed by: COUNSELOR

## 2025-04-25 NOTE — PROGRESS NOTES
Individual Psychotherapy (LPC)    05/15/2025  Interim Events/Subjective Report/Content of Current Session:  follow-up appointment.    Pt is a 69 y.o. male with past psychiatric hx of  relationship issues who presents for follow-up treatment.    Pt reported working around the house. Pt expressed home stressors.     LPC and Pt discussed lack of intimacy with his wife. Pt expressed giving up with trying with her.     Pt further processed [employer] expectations. Pt discussed old hobbies and current hobbies.     Pt reported sleep issues lately.     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    Pt processed getting older. Pt expressed need to see the heart doctor. Pt stated neck issues. Pt expressed pain started a few weeks ago.     Pt described his brother and their relationship.     Pt processed his life.     Pt further processed the death of his mother. Pt processed second Mother's Day without her.     Pt processed Mother's Day. Pt expressed it went well with family.     Pt processed an invention idea. Pt reported talking to his . Pt described how he got his idea for it. Pt reported his dream for his product.     Goals: work on relationship issues, behavioral issues, effective communication, cope with stress   Current symptoms:  Depression: denies.  Anxiety: denies.   Panic Attacks: n/a  Sleep: Pt reported taking medication.  Flashbacks/nightmares: n/a  Daxa:  denies.  Psychosis: denies .  Hygiene: Pt denied issues.  Appetite: Pt reported hasn't been good in the past 6 months.  Motivation/Energy: Pt reported better.   Sobriety: Pt reported no relapse. Pt reported still watch porn, however hasn't hooked up anyone.   Cravings: 4/10  Pt endorsed urges a few times. Pt reported eating more sugar.   Relapses: Pt denied.  Triggers: n/a  Recovery efforts: Pt reported staying busy, work, appointments, and watching educational stuff.  Therapeutic Intervention/Techniques: behavior modification, insight oriented, and supportive;  relevant to diagnosis, patient responds to this modality    Will continue to follow.   Pt aware to contact LPC for any additional needs that may occur prior to next session.    Risk Parameters:  Patient reports no suicidal ideation  Patient reports no homicidal ideation  Patient reports no self-injurious behavior  Patient reports no violent behavior    Diagnosis:   1. Adjustment disorder, unspecified type            Return to Clinic: 3 weeks  Counseling time: 45  -Call to report any worsening of symptoms or problems associated with medication.  - Pt instructed to go to ER if thoughts of harming self or others arise.   -Supportive therapy and psychoeducation provided  -Pt instructed to call clinic, 911 or go to nearest emergency room if sxs worsen or pt is in crisis. The pt expresses understanding.   Each patient to whom he or she provides medical services by telemedicine is:  (1) informed of the relationship between the physician and patient and the respective role of any other health care provider with respect to management of the patient; and (2) notified that he or she may decline to receive medical services by telemedicine and may withdraw from such care at any time.

## 2025-04-28 DIAGNOSIS — N20.0 KIDNEY STONES: ICD-10-CM

## 2025-04-28 RX ORDER — ALLOPURINOL 300 MG/1
300 TABLET ORAL
Qty: 90 TABLET | Refills: 0 | Status: SHIPPED | OUTPATIENT
Start: 2025-04-28

## 2025-04-28 NOTE — TELEPHONE ENCOUNTER
Refill Routing Note   Medication(s) are not appropriate for processing by [facility] for the following reason(s):        Required labs outdated    [facility] action(s):  Defer      Medication Therapy Plan: FLOS      Appointments  past 12m or future 3m with PCP    Date Provider   Last Visit   10/31/2024 Blanca Doe MD   Next Visit   5/12/2025 Blanca Doe MD   ED visits in past 90 days: 0        Note composed:3:39 AM 04/28/2025

## 2025-04-28 NOTE — TELEPHONE ENCOUNTER
No care due was identified.  [facility] Embedded Care Due Messages. Reference number: [id number].   4/28/2025 2:16:51 AM CDT

## 2025-04-30 ENCOUNTER — LAB VISIT (OUTPATIENT)
Dept: LAB | Facility: HOSPITAL | Age: 70
End: 2025-04-30
Attending: FAMILY MEDICINE
Payer: MEDICARE

## 2025-04-30 DIAGNOSIS — E79.0 HYPERURICEMIA: ICD-10-CM

## 2025-04-30 DIAGNOSIS — E11.8 DIABETES MELLITUS TYPE 2 WITH COMPLICATIONS: ICD-10-CM

## 2025-04-30 LAB
ALBUMIN SERPL BCP-MCNC: 4 G/DL (ref 3.5–5.2)
ALBUMIN/CREAT UR: 21.7 UG/MG
ALP SERPL-CCNC: 85 UNIT/L (ref 40–150)
ALT SERPL W/O P-5'-P-CCNC: 34 UNIT/L (ref 10–44)
ANION GAP (OHS): 9 MMOL/L (ref 8–16)
AST SERPL-CCNC: 25 UNIT/L (ref 11–45)
BILIRUB SERPL-MCNC: 0.7 MG/DL (ref 0.1–1)
BUN SERPL-MCNC: 17 MG/DL (ref 8–23)
CALCIUM SERPL-MCNC: 9.5 MG/DL (ref 8.7–10.5)
CHLORIDE SERPL-SCNC: 106 MMOL/L (ref 95–110)
CO2 SERPL-SCNC: 26 MMOL/L (ref 23–29)
CREAT SERPL-MCNC: 0.9 MG/DL (ref 0.5–1.4)
CREAT UR-MCNC: 120 MG/DL (ref 23–375)
EAG (OHS): 143 MG/DL (ref 68–131)
ERYTHROCYTE [DISTWIDTH] IN BLOOD BY AUTOMATED COUNT: 12.7 % (ref 11.5–14.5)
GFR SERPLBLD CREATININE-BSD FMLA CKD-EPI: >60 ML/MIN/1.73/M2
GLUCOSE SERPL-MCNC: 127 MG/DL (ref 70–110)
HBA1C MFR BLD: 6.6 % (ref 4–5.6)
HCT VFR BLD AUTO: 48.2 % (ref 40–54)
HGB BLD-MCNC: 15.9 GM/DL (ref 14–18)
MCH RBC QN AUTO: 30.4 PG (ref 27–31)
MCHC RBC AUTO-ENTMCNC: 33 G/DL (ref 32–36)
MCV RBC AUTO: 92 FL (ref 82–98)
MICROALBUMIN UR-MCNC: 26 UG/ML (ref ?–5000)
PLATELET # BLD AUTO: 263 K/UL (ref 150–450)
PMV BLD AUTO: 10 FL (ref 9.2–12.9)
POTASSIUM SERPL-SCNC: 4 MMOL/L (ref 3.5–5.1)
PROT SERPL-MCNC: 7.7 GM/DL (ref 6–8.4)
RBC # BLD AUTO: 5.23 M/UL (ref 4.6–6.2)
SODIUM SERPL-SCNC: 141 MMOL/L (ref 136–145)
URATE SERPL-MCNC: 3.4 MG/DL (ref 3.4–7)
WBC # BLD AUTO: 8.95 K/UL (ref 3.9–12.7)

## 2025-04-30 PROCEDURE — 84550 ASSAY OF BLOOD/URIC ACID: CPT

## 2025-04-30 PROCEDURE — 83036 HEMOGLOBIN GLYCOSYLATED A1C: CPT

## 2025-04-30 PROCEDURE — 85027 COMPLETE CBC AUTOMATED: CPT

## 2025-04-30 PROCEDURE — 82043 UR ALBUMIN QUANTITATIVE: CPT

## 2025-04-30 PROCEDURE — 80053 COMPREHEN METABOLIC PANEL: CPT

## 2025-04-30 PROCEDURE — 36415 COLL VENOUS BLD VENIPUNCTURE: CPT | Mod: PO

## 2025-05-03 ENCOUNTER — PATIENT MESSAGE (OUTPATIENT)
Dept: FAMILY MEDICINE | Facility: CLINIC | Age: 70
End: 2025-05-03
Payer: MEDICARE

## 2025-05-06 LAB
LEFT EYE DM RETINOPATHY: NEGATIVE
RIGHT EYE DM RETINOPATHY: NEGATIVE

## 2025-05-12 ENCOUNTER — OFFICE VISIT (OUTPATIENT)
Dept: FAMILY MEDICINE | Facility: CLINIC | Age: 70
End: 2025-05-12
Payer: MEDICARE

## 2025-05-12 VITALS
HEART RATE: 74 BPM | RESPIRATION RATE: 14 BRPM | SYSTOLIC BLOOD PRESSURE: 118 MMHG | DIASTOLIC BLOOD PRESSURE: 82 MMHG | OXYGEN SATURATION: 98 % | HEIGHT: 65 IN | BODY MASS INDEX: 28.06 KG/M2 | WEIGHT: 168.44 LBS | TEMPERATURE: 98 F

## 2025-05-12 DIAGNOSIS — Z23 NEED FOR VACCINATION: ICD-10-CM

## 2025-05-12 DIAGNOSIS — E11.69 HYPERLIPIDEMIA ASSOCIATED WITH TYPE 2 DIABETES MELLITUS: ICD-10-CM

## 2025-05-12 DIAGNOSIS — E11.59 HYPERTENSION ASSOCIATED WITH DIABETES: ICD-10-CM

## 2025-05-12 DIAGNOSIS — I48.11 LONGSTANDING PERSISTENT ATRIAL FIBRILLATION: ICD-10-CM

## 2025-05-12 DIAGNOSIS — I15.2 HYPERTENSION ASSOCIATED WITH DIABETES: ICD-10-CM

## 2025-05-12 DIAGNOSIS — E79.0 HYPERURICEMIA: ICD-10-CM

## 2025-05-12 DIAGNOSIS — E11.8 DIABETES MELLITUS TYPE 2 WITH COMPLICATIONS: Primary | ICD-10-CM

## 2025-05-12 DIAGNOSIS — E78.5 HYPERLIPIDEMIA ASSOCIATED WITH TYPE 2 DIABETES MELLITUS: ICD-10-CM

## 2025-05-12 PROCEDURE — 90480 ADMN SARSCOV2 VAC 1/ONLY CMP: CPT | Mod: S$GLB,,, | Performed by: FAMILY MEDICINE

## 2025-05-12 PROCEDURE — 99214 OFFICE O/P EST MOD 30 MIN: CPT | Mod: S$GLB,,, | Performed by: FAMILY MEDICINE

## 2025-05-12 PROCEDURE — 91322 SARSCOV2 VAC 50 MCG/0.5ML IM: CPT | Mod: S$GLB,,, | Performed by: FAMILY MEDICINE

## 2025-05-12 PROCEDURE — G2211 COMPLEX E/M VISIT ADD ON: HCPCS | Mod: S$GLB,,, | Performed by: FAMILY MEDICINE

## 2025-05-12 NOTE — PATIENT INSTRUCTIONS
[facility] Cardiology  Please call [phone] to schedule an appointment.   Dr Haley de la cruz and willa    Discuss with Dr Connelly elevated PSA and Prostate MRI

## 2025-05-13 ENCOUNTER — PATIENT OUTREACH (OUTPATIENT)
Dept: ADMINISTRATIVE | Facility: HOSPITAL | Age: 70
End: 2025-05-13
Payer: MEDICARE

## 2025-05-13 NOTE — PROGRESS NOTES
External Record Received. Uploaded and hyper linked outside DM Eye Exam into [facility]

## 2025-05-14 ENCOUNTER — TELEPHONE (OUTPATIENT)
Dept: FAMILY MEDICINE | Facility: CLINIC | Age: 70
End: 2025-05-14
Payer: MEDICARE

## 2025-05-14 RX ORDER — CYCLOBENZAPRINE HCL 10 MG
10 TABLET ORAL NIGHTLY
Qty: 30 TABLET | Refills: 1 | Status: SHIPPED | OUTPATIENT
Start: 2025-05-14 | End: 2025-05-24

## 2025-05-14 NOTE — TELEPHONE ENCOUNTER
----- Message from Mell sent at 5/14/2025  1:04 PM CDT -----  Type:  Needs Medical AdviceWho Called: ptSymptoms (please be specific): na How long has patient had these symptoms:  naPharmacy name and phone #:  [id number] - [facility] [address] [facility] [id number] [facility] [address]Ftglv: [phone] Fax: [phone] the patient rather a call back or a response via [facility]? Call [facility] Call Back Number: [phone] Information: pt states office was supposed to call in a muscle relaxer for the pt on Monday when he saw the doctor but it has still not been called in. Pt also states that he called in yesterday as well.   Please call back to advise. Thanks!

## 2025-05-14 NOTE — TELEPHONE ENCOUNTER
----- Message from Emely sent at 5/13/2025 12:19 PM CDT -----  Regarding: Needs Medical Advice  Type: Needs Medical AdviceWho Called:  patient at [phone] Information: A prescription for the patient was supposed to be sent over to [facility] for a muscle relaxer and [facility] doesn't have it. Please call and advise. Thank you. [id number] [id number] [place] [address] [place] [facility] [place] [id number] [address]Mydec: [phone] Fax: [phone]

## 2025-05-14 NOTE — PROGRESS NOTES
Subjective:       Patient ID: Trevor Johnston is a 69 y.o. male.    Chief Complaint: Follow-up (6 month follow up )    History of Present Illness    CHIEF COMPLAINT:  Patient presents today for his six-month follow up.  Overall he is doing well.  He recently had labs which reviewed today.  He would be willing to have his COVID vaccine.    CARDIOVASCULAR:  He has persistent atrial fibrillation but has no symptoms with this. He has an implanted loop recorder.  There has been discussion regarding Watchman procedure but no further follow-up occurred.  He is currently taking Xarelto and Toprol.    SLEEP APNEA:  He uses CPAP for limited hours at night, reporting difficulty adapting to the device. He states it sometimes disrupts his sleep and causes discomfort, resulting in reduced overall sleep. He denies noticing any improvement in symptoms, possibly due to not using it for the recommended 8 hours.    DIABETES:  His A1C has increased to 6.6 from previous value of 6.3. He denies checking blood sugar at home.  He is currently taking Jardiance and Glucophage    HYPERTENSION:  Today his blood pressure is 118/82.  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  His recent uric acid level was 3.4.  He has not had any recurrent stone since starting the allopurinol    MUSCULOSKELETAL:  He reports sharp toe pain in his left great toe, primarily occurring during periods of inactivity such as while sitting. He denies significant pain during daily activities.  He is also having right-sided neck pain without paresthesias into the head or neck    ELEVATED PSA:  He is following with his urologist.  In early 2021, he had a prostate MRI and prostate biopsy but no further testing has been recommended by his urologist          Review of Systems   Constitutional:  Negative for appetite change, chills, diaphoresis, fatigue, fever and unexpected  weight change.   HENT:  Negative for congestion, ear pain, postnasal drip, rhinorrhea, sinus pressure, sneezing, sore throat and trouble swallowing.    Eyes:  Negative for pain, discharge and visual disturbance.   Respiratory:  Negative for cough, chest tightness, shortness of breath and wheezing.    Cardiovascular:  Negative for chest pain, palpitations and leg swelling.   Gastrointestinal:  Negative for abdominal distention, abdominal pain, blood in stool, constipation, diarrhea, nausea and vomiting.   Musculoskeletal:  Positive for neck pain.   Skin:  Negative for rash.         Objective:      Physical Exam  Constitutional:       General: He is not in acute distress.     Appearance: Normal appearance. He is well-developed.   HENT:      Head: Normocephalic and atraumatic.      Right Ear: Hearing, tympanic membrane, ear canal and external ear normal.      Left Ear: Hearing, tympanic membrane, ear canal and external ear normal.      Nose: Nose normal.      Mouth/Throat:      Mouth: No oral lesions.      Pharynx: No oropharyngeal exudate or posterior oropharyngeal erythema.   Eyes:      General: Lids are normal. No scleral icterus.     Extraocular Movements: Extraocular movements intact.      Conjunctiva/sclera: Conjunctivae normal.      Pupils: Pupils are equal, round, and reactive to light.   Neck:      Thyroid: No thyroid mass or thyromegaly.      Vascular: No carotid bruit.      Comments: He has tension and pain on palpation of the right trapezius muscle  Cardiovascular:      Rate and Rhythm: Normal rate and regular rhythm. No extrasystoles are present.     Chest Wall: PMI is not displaced.      Pulses:           Dorsalis pedis pulses are 2+ on the right side and 2+ on the left side.        Posterior tibial pulses are 2+ on the right side and 2+ on the left side.      Heart sounds: Normal heart sounds. No murmur heard.     No gallop.   Pulmonary:      Effort: Pulmonary effort is normal. No accessory muscle usage  "or respiratory distress.      Breath sounds: Normal breath sounds.   Abdominal:      General: Bowel sounds are normal. There is no abdominal bruit.      Palpations: Abdomen is soft.      Tenderness: There is no abdominal tenderness. There is no rebound.   Musculoskeletal:      Cervical back: Normal range of motion and neck supple. Muscular tenderness present.      Right foot: No deformity.      Left foot: No deformity.   Feet:      Right foot:      Protective Sensation: 10 sites tested.  10 sites sensed.      Skin integrity: Warmth present. No ulcer or callus.      Left foot:      Protective Sensation: 10 sites tested.  10 sites sensed.      Skin integrity: Warmth present. No ulcer or callus.   Lymphadenopathy:      Head:      Right side of head: No submental or submandibular adenopathy.      Left side of head: No submental or submandibular adenopathy.      Cervical:      Right cervical: No superficial, deep or posterior cervical adenopathy.     Left cervical: No superficial, deep or posterior cervical adenopathy.      Upper Body:      Right upper body: No supraclavicular adenopathy.      Left upper body: No supraclavicular adenopathy.   Skin:     General: Skin is warm and dry.   Neurological:      Mental Status: He is alert and oriented to person, place, and time.       Blood pressure 118/82, pulse 74, temperature 97.9 °F (36.6 °C), temperature source Oral, resp. rate 14, height 5' 5" (1.651 m), weight 76.4 kg (168 lb 6.9 oz), SpO2 98%.Body mass index is 28.03 kg/m².            Assessment & Plan    1. Reviewed recent labs: A1C slightly elevated at 6.6 (from 6.3), but still within acceptable range (goal <7.2).  2. Normal urine test for diabetes, good uric acid level (3.4), and normal chemistry panel.  3. Considered nerve pain medication for toe discomfort if it becomes more problematic.    TYPE 2 DIABETES MELLITUS:  - Monitored patient's A1C, which increased from 6.3 to 6.6, still acceptable with goal to maintain " below 7.2.  - annual urine test was normal  - Continuing Metformin and Jardiance for diabetes management  - Will recheck A1C in 6 months to monitor diabetes progression.    ATRIAL FIBRILLATION:  - Monitored the patient's persistent atrial fibrillation, noting the patient is taking Xarelto and Toprol for management and is asymptomatic.  - Patient has a loop recorder implanted for monitoring.  - Discussed ablation procedure  and Watchman device to block the area of heart prone to blood clot formation.  - Either option could potentially eliminate the need for Xarelto.  - Recommend follow-up with Dr. Vallejo regarding these procedures.    OBSTRUCTIVE SLEEP APNEA:  - Monitored the patient's CPAP usage over the last few months, noting struggles with comfort and usage duration.  - Patient does not feel any different when using CPAP, despite expectations of feeling more refreshed.  - Discussed trying nasal cushions instead of nasal pillows for improved comfort.  - Advised the patient to continue efforts to adapt to CPAP usage.    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 Connelly    LEFT TOE PAIN POSSIBLY REPRESENTING DIABETIC NEUROPATHY:  - Evaluated the patient's report of pain in the left toe, described as a short pain, possibly nerve-related.  - Discussed potential ner neuropathic ve medicine if the toe pain becomes more problematic.    HYPERLIPIDEMIA:  - continue with Lipitor and recheck labs in 6 months    I will see him back in 6 months or sooner if needed            This note was generated with the assistance of ambient listening technology. Verbal consent was obtained by the patient and accompanying visitor(s) for the recording of patient appointment to facilitate this note. I  attest to having reviewed and edited the generated note for accuracy, though some syntax or spelling errors may persist. Please contact the author of this note for any clarification.

## 2025-05-15 ENCOUNTER — CLINICAL SUPPORT (OUTPATIENT)
Dept: PSYCHIATRY | Facility: CLINIC | Age: 70
End: 2025-05-15
Payer: MEDICARE

## 2025-05-15 DIAGNOSIS — F43.20 ADJUSTMENT DISORDER, UNSPECIFIED TYPE: Primary | ICD-10-CM

## 2025-05-15 PROCEDURE — 90834 PSYTX W PT 45 MINUTES: CPT | Mod: ,,, | Performed by: COUNSELOR

## 2025-05-29 ENCOUNTER — OFFICE VISIT (OUTPATIENT)
Dept: FAMILY MEDICINE | Facility: CLINIC | Age: 70
End: 2025-05-29
Payer: MEDICARE

## 2025-05-29 VITALS
WEIGHT: 167.75 LBS | OXYGEN SATURATION: 97 % | HEIGHT: 65 IN | HEART RATE: 72 BPM | BODY MASS INDEX: 27.95 KG/M2 | SYSTOLIC BLOOD PRESSURE: 122 MMHG | DIASTOLIC BLOOD PRESSURE: 74 MMHG

## 2025-05-29 DIAGNOSIS — Z00.00 ENCOUNTER FOR MEDICARE ANNUAL WELLNESS EXAM: Primary | ICD-10-CM

## 2025-05-29 DIAGNOSIS — I15.2 HYPERTENSION ASSOCIATED WITH DIABETES: ICD-10-CM

## 2025-05-29 DIAGNOSIS — I48.0 PAROXYSMAL ATRIAL FIBRILLATION: ICD-10-CM

## 2025-05-29 DIAGNOSIS — E11.69 TYPE 2 DIABETES MELLITUS WITH OTHER SPECIFIED COMPLICATION, WITHOUT LONG-TERM CURRENT USE OF INSULIN: ICD-10-CM

## 2025-05-29 DIAGNOSIS — E11.59 HYPERTENSION ASSOCIATED WITH DIABETES: ICD-10-CM

## 2025-05-29 DIAGNOSIS — E11.69 HYPERLIPIDEMIA ASSOCIATED WITH TYPE 2 DIABETES MELLITUS: ICD-10-CM

## 2025-05-29 DIAGNOSIS — E11.29 TYPE 2 DIABETES MELLITUS WITH MICROALBUMINURIA, WITHOUT LONG-TERM CURRENT USE OF INSULIN: ICD-10-CM

## 2025-05-29 DIAGNOSIS — I10 PRIMARY HYPERTENSION: ICD-10-CM

## 2025-05-29 DIAGNOSIS — E78.2 MIXED HYPERLIPIDEMIA: ICD-10-CM

## 2025-05-29 DIAGNOSIS — S56.922D LACERATION OF LEFT FOREARM WITH TENDON INVOLVEMENT, SUBSEQUENT ENCOUNTER: ICD-10-CM

## 2025-05-29 DIAGNOSIS — R80.9 TYPE 2 DIABETES MELLITUS WITH MICROALBUMINURIA, WITHOUT LONG-TERM CURRENT USE OF INSULIN: ICD-10-CM

## 2025-05-29 DIAGNOSIS — S51.812D LACERATION OF LEFT FOREARM WITH TENDON INVOLVEMENT, SUBSEQUENT ENCOUNTER: ICD-10-CM

## 2025-05-29 DIAGNOSIS — I70.0 AORTIC ATHEROSCLEROSIS: ICD-10-CM

## 2025-05-29 DIAGNOSIS — E78.5 HYPERLIPIDEMIA ASSOCIATED WITH TYPE 2 DIABETES MELLITUS: ICD-10-CM

## 2025-05-29 PROCEDURE — 99999 PR PBB SHADOW E&M-EST. PATIENT-LVL IV: CPT | Mod: PBBFAC,,, | Performed by: NURSE PRACTITIONER

## 2025-05-29 PROCEDURE — 99214 OFFICE O/P EST MOD 30 MIN: CPT | Mod: PBBFAC,PO | Performed by: NURSE PRACTITIONER

## 2025-05-29 RX ORDER — CEFADROXIL 500 MG/1
500 CAPSULE ORAL 2 TIMES DAILY
COMMUNITY

## 2025-05-29 RX ORDER — CYCLOBENZAPRINE HCL 10 MG
10 TABLET ORAL NIGHTLY
COMMUNITY
Start: 2025-05-14

## 2025-05-29 NOTE — PATIENT INSTRUCTIONS
Counseling and Referral of Other Preventative  (Italic type indicates deductible and co-insurance are waived)    Patient Name: Trevor Johnston  Today's Date: 5/29/2025    Health Maintenance       Date Due Completion Date    Lipid Panel 10/29/2025 10/29/2024    Hemoglobin A1c 10/30/2025 4/30/2025    Diabetes Urine Screening 04/30/2026 4/30/2025    Diabetic Eye Exam 05/06/2026 5/6/2025    Foot Exam 05/12/2026 5/12/2025    High Dose Statin 05/14/2026 5/14/2025    Colorectal Cancer Screening 09/30/2026 9/30/2021    TETANUS VACCINE 05/22/2035 5/22/2025        No orders of the defined types were placed in this encounter.      The following information is provided to all patients.  This information is to help you find resources for any of the problems found today that may be affecting your health:                  Living healthy guide: www.[facility]      Understanding Diabetes: www.diabetes.org      Eating healthy: www.cdc.gov/healthyweight      CDC home safety checklist: www.cdc.gov/steadi/patient.html      Agency on Aging: www.goea.[place].[facility]      Alcoholics anonymous (AA): www.aa.org      Physical Activity: www.lisa.nih.gov/ph1lgby      Tobacco use: www.quitwithusla.org

## 2025-05-29 NOTE — PROGRESS NOTES
"  Trevor Johnston presented for an initial Medicare AWV today. The following components were reviewed and updated:    Medical history  Family History  Social history  Allergies and Current Medications  Health Risk Assessment  Health Maintenance  Care Team    **See Completed Assessments for Annual Wellness visit with in the encounter summary    The following assessments were completed:  Depression Screening  Cognitive function Screening    Timed Get Up Test  Whisper Test    Opioid documentation:  Patient does not have a current opioid prescription.        Vitals:    05/29/25 1325   BP: 122/74   BP Location: Left arm   Patient Position: Sitting   Pulse: 72   SpO2: 97%   Weight: 76.1 kg (167 lb 12.3 oz)   Height: 5' 5" (1.651 m)     Body mass index is 27.92 kg/m².       Physical Exam  Vitals reviewed.   Constitutional:       General: He is not in acute distress.     Appearance: He is not ill-appearing.   Pulmonary:      Effort: Pulmonary effort is normal. No respiratory distress.   Neurological:      Mental Status: He is alert and oriented to person, place, and time.   Psychiatric:         Mood and Affect: Mood normal.         Behavior: Behavior normal.         Thought Content: Thought content normal.         Judgment: Judgment normal.     Diagnoses and health risks identified today and associated recommendations/orders:  1. Encounter for Medicare annual wellness exam  Reviewed and discussed health maintenance.      2. Paroxysmal atrial fibrillation  Stable- continue current treatment and follow up routinely with PCP and cardiology ()  Consult with  for ablation vs watchman  Continue xarelto 20mg every evening and toprol xl 25mg daily  Loop recorder    3. Hypertension associated with diabetes  Stable- continue current treatment and follow up routinely with PCP and cardiology ()  On statin, avapro 75mg daily and toprol xl 25mg daily    4. Primary hypertension  Stable- continue current treatment and " follow up routinely with PCP and cardiology ()  On statin, avapro 75mg daily and toprol xl 25mg daily    5. Mixed hyperlipidemia  Stable- continue current treatment and follow up routinely with PCP and cardiology ()  Liptior 40mg daily  Lab Results   Component Value Date    CHOL 139 10/29/2024    CHOL 142 10/16/2023    CHOL 178 03/13/2023      Lab Results   Component Value Date    HDL 46 10/29/2024    HDL 41 10/16/2023    HDL 38 (L) 03/13/2023     Lab Results   Component Value Date    LDLCALC 71.4 10/29/2024    LDLCALC 81.6 10/16/2023    LDLCALC 78.2 03/13/2023      Lab Results   Component Value Date    TRIG 108 10/29/2024    TRIG 97 10/16/2023    TRIG 309 (H) 03/13/2023     Lab Results   Component Value Date    TOTALCHOLEST 3.0 10/29/2024    TOTALCHOLEST 3.5 10/16/2023    TOTALCHOLEST 4.7 03/13/2023     Lab Results   Component Value Date    NONHDLCHOL 93 10/29/2024    NONHDLCHOL 101 10/16/2023    NONHDLCHOL 140 03/13/2023     Lab Results   Component Value Date    CHOLHDL 33.1 10/29/2024    CHOLHDL 28.9 10/16/2023    CHOLHDL 21.3 03/13/2023      6. Aortic atherosclerosis  Stable- continue current treatment and follow up routinely with PCP   Htn and hld controlled  On statin    7. Type 2 diabetes mellitus with microalbuminuria, without long-term current use of insulin  Stable- continue current treatment and follow up routinely with PCP   Encouraged healthy eating, weight loss and routine exercise   Jardiance 10mg daily and metformin 500mg am and 1000mg pm  On statin  Lab Results   Component Value Date    HGBA1C 6.6 (H) 04/30/2025      8. Type 2 diabetes mellitus with other specified complication, without long-term current use of insulin  Stable- continue current treatment and follow up routinely with PCP   Encouraged healthy eating, weight loss and routine exercise   Jardiance 10mg daily and metformin 500mg am and 1000mg pm  On statin    9. Hyperlipidemia associated with type 2 diabetes mellitus  Stable-  continue current treatment and follow up routinely with PCP   Encouraged healthy eating, weight loss and routine exercise   Jardiance 10mg daily and metformin 500mg am and 1000mg pm  Continue liptior 40mg daily  Lab Results   Component Value Date    HGBA1C 6.6 (H) 04/30/2025        10. Laceration of left forearm with tendon involvement, subsequent encounter  Wrapped in ACE bandage  Follow up with  next week as scheduled    Provided Trevor with a 5-10 year written screening schedule and personal prevention plan. Recommendations were developed using the USPSTF age appropriate recommendations. Education, counseling, and referrals were provided as needed.  After Visit Summary printed and given to patient which includes a list of additional screenings\tests needed.    I offered to discuss advanced care planning, including how to pick a person who would make decisions for you if you were unable to make them for yourself, called a health care power of , and what kind of decisions you might make such as use of life sustaining treatments such as ventilators and tube feeding when faced with a life limiting illness recorded on a living will that they will need to know. (How you want to be cared for as you near the end of your natural life)  Patient declined a discussion regarding advance directives at this time. He will complete independently with his family   Yudy Gibbs NP

## 2025-06-05 ENCOUNTER — CLINICAL SUPPORT (OUTPATIENT)
Dept: PSYCHIATRY | Facility: CLINIC | Age: 70
End: 2025-06-05
Payer: MEDICARE

## 2025-06-05 DIAGNOSIS — Z63.9 RELATIONSHIP PROBLEMS: ICD-10-CM

## 2025-06-05 DIAGNOSIS — F43.20 ADJUSTMENT DISORDER, UNSPECIFIED TYPE: Primary | ICD-10-CM

## 2025-06-05 PROCEDURE — 90837 PSYTX W PT 60 MINUTES: CPT | Mod: ,,, | Performed by: COUNSELOR

## 2025-06-05 SDOH — SOCIAL DETERMINANTS OF HEALTH (SDOH): PROBLEM RELATED TO PRIMARY SUPPORT GROUP, UNSPECIFIED: Z63.9

## 2025-06-09 NOTE — PROGRESS NOTES
Individual Psychotherapy ([facility])    06/26/2025  Interim Events/Subjective Report/Content of Current Session:  follow-up appointment.    Pt is a 69 y.o. male with past psychiatric hx of  relationship issues who presents for follow-up treatment.    Pt discussed Father's Day.     LPC and Pt processed recent injury. Pt reported thumb has been sore. Pt expressed hand is feeling better. Pt stated doing PT twice a week.    LPC and PT discussed relationship wife lately since injury. Pt reported wife helped him outside in the yard. Pt expressed trouble identifying how she shows love. Pt reported feeling like it was out of character for her.     LPC and PT processed possible vacation with wife. Pt reported trip on the July 17-21st to see his brother. Pt explained excitement about the trip. Pt stated first trip together in 9 years ago. Pt expressed his frustration with wife over committing to things with her kids.     Pt discussed spending time with his grandson. Pt reported he has been helping with yard work.     Pt discussed issues with daughter.    LPC and PT discussed stressors. Pt reported working outside more last week, AC went out, oven went out, and other house issues.     Pt discussed sleep issues. LPC and PT discussed sleep hygiene. LPC provided PT with a packet. Did not discuss.    Pt processed his values about family. Pt identified treatment of elders in the family. Pt discussed views on cheating in [place] culture. Pt reported understanding cheating was wrong. Pt stated high sex drive.  Did not discuss.    Pt processed fathers relationship. Pt stated father never had hobbies. Pt reported father was a good provider, high work ethic, and strong. Pt identified fathers character defects.  LPC and PT further processed.  Did not discuss.    Goals: work on relationship issues, behavioral issues, effective communication, cope with stress   Current symptoms:  Depression: insomnia, fatigue, and difficulty  concentrating.  Anxiety: denies.   Panic Attacks: n/a  Sleep: Pt reported taking medication.  Flashbacks/nightmares: n/a  Daxa:  denies.  Psychosis: denies .  Hygiene: Pt denied issues.  Appetite: Pt reported hasn't been good in the past 6 months.  Motivation/Energy: Pt reported better.   Sobriety: Pt reported no relapse. Pt reported still watch porn, however hasn't hooked up anyone.   Cravings: 4/10  Pt endorsed urges a few times. Pt reported eating more sugar.   Relapses: Pt denied.  Triggers: n/a  Recovery efforts: Pt reported staying busy, work, appointments, and watching educational stuff.  Therapeutic Intervention/Techniques: behavior modification, insight oriented, and supportive; relevant to diagnosis, patient responds to this modality    Will continue to follow.   Pt aware to contact LPC for any additional needs that may occur prior to next session.    Risk Parameters:  Patient reports no suicidal ideation  Patient reports no homicidal ideation  Patient reports no self-injurious behavior  Patient reports no violent behavior    Diagnosis:   1. Depression, unspecified depression type                Return to Clinic: 3 weeks  Counseling time: 30  -Call to report any worsening of symptoms or problems associated with medication.  - Pt instructed to go to ER if thoughts of harming self or others arise.   -Supportive therapy and psychoeducation provided  -Pt instructed to call clinic, 911 or go to nearest emergency room if sxs worsen or pt is in crisis. The pt expresses understanding.   Each patient to whom he or she provides medical services by telemedicine is:  (1) informed of the relationship between the physician and patient and the respective role of any other health care provider with respect to management of the patient; and (2) notified that he or she may decline to receive medical services by telemedicine and may withdraw from such care at any time.

## 2025-06-11 NOTE — TELEPHONE ENCOUNTER
Refill Routing Note   Medication(s) are not appropriate for processing by [facility] for the following reason(s):        Drug-disease interaction: empagliflozin and Hypertension associated with diabetes     [facility] action(s):  Defer             Pharmacist review requested: Yes     Appointments  past 12m or future 3m with PCP    Date Provider   Last Visit   5/12/2025 Blanca Doe MD   Next Visit   11/17/2025 Blanca Doe MD   ED visits in past 90 days: 0        Note composed:10:39 AM 06/11/2025

## 2025-06-11 NOTE — TELEPHONE ENCOUNTER
No care due was identified.  [facility] Embedded Care Due Messages. Reference number: [id number].   6/11/2025 2:49:32 AM CDT

## 2025-06-11 NOTE — TELEPHONE ENCOUNTER
Trevor Johnston  is requesting a refill authorization.  Brief Assessment and Rationale for Refill:  Approve     Medication Therapy Plan:         Pharmacist review requested: Yes   Extended chart review required: Yes   Comments:     Note composed:11:12 AM 06/11/2025

## 2025-06-26 ENCOUNTER — CLINICAL SUPPORT (OUTPATIENT)
Dept: PSYCHIATRY | Facility: CLINIC | Age: 70
End: 2025-06-26
Payer: MEDICARE

## 2025-06-26 DIAGNOSIS — F32.A DEPRESSION, UNSPECIFIED DEPRESSION TYPE: Primary | ICD-10-CM

## 2025-06-26 PROCEDURE — 90832 PSYTX W PT 30 MINUTES: CPT | Mod: ,,, | Performed by: COUNSELOR

## 2025-06-30 DIAGNOSIS — E11.69 TYPE 2 DIABETES MELLITUS WITH OTHER SPECIFIED COMPLICATION, WITHOUT LONG-TERM CURRENT USE OF INSULIN: ICD-10-CM

## 2025-06-30 RX ORDER — METFORMIN HYDROCHLORIDE 500 MG/1
TABLET, EXTENDED RELEASE ORAL
Qty: 270 TABLET | Refills: 1 | Status: SHIPPED | OUTPATIENT
Start: 2025-06-30

## 2025-06-30 NOTE — TELEPHONE ENCOUNTER
Refill Decision Note   Trevor Johnston  is requesting a refill authorization.  Brief Assessment and Rationale for Refill:  Approve     Medication Therapy Plan:         Pharmacist review requested: Yes   Comments:     Note composed:12:12 PM 06/30/2025

## 2025-06-30 NOTE — TELEPHONE ENCOUNTER
Refill Routing Note   Medication(s) are not appropriate for processing by [facility] for the following reason(s):        Drug-disease interaction    [facility] action(s):  Defer      Medication Therapy Plan: Obstructed, uropathy; Obstructive uropathy    Pharmacist review requested: Yes     Appointments  past 12m or future 3m with PCP    Date Provider   Last Visit   5/12/2025 Blanca Doe MD   Next Visit   11/17/2025 Blanca Doe MD   ED visits in past 90 days: 0        Note composed:11:59 AM 06/30/2025

## 2025-06-30 NOTE — TELEPHONE ENCOUNTER
No care due was identified.  [facility] Embedded Care Due Messages. Reference number: [id number].   6/30/2025 8:54:40 AM CDT

## 2025-07-23 NOTE — PROGRESS NOTES
Individual Psychotherapy ([facility])    8/03562    Interim Events/Subjective Report/Content of Current Session:  follow-up appointment.    Pt is a 69 y.o. male with past psychiatric hx of  relationship issues who presents for follow-up treatment.    LPC and PT discussed relationship wife. Pt denied going out to eat with each other in the last few years. Pt explained wanting to go to dinner with her soon. Pt discussed wife's personality traits that bother him.     Pt discussed medical issues. Pt reported things have been stable.    LPC and PT processed possible vacation with wife. Pt processed feeling like he is not a priority. Pt discussed going on vacation. Pt reported pleasant, relaxing, spending time with his brother.    Pt discussed issues with daughter.     Pt processed hobbies and yard work lately.     LPC and PT discussed stressors. Pt reported fixing everything around the house and working on issues with his apartments. Pt stated generator issues. Pt stated hand has been tingling more and arm pain. Pt endorsed issues with taking care of his grandchildren.     Pt discussed sleep issues. LPC and PT discussed sleep hygiene. LPC provided PT with a packet. Did not discuss.    Pt processed his values about family. Pt identified treatment of elders in the family. Pt discussed views on cheating in [place] culture. Pt reported understanding cheating was wrong. Pt stated high sex drive.  Did not discuss.    Pt processed fathers relationship. Pt stated father never had hobbies. Pt reported father was a good provider, high work ethic, and strong. Pt identified fathers character defects.  LPC and PT further processed. Did not discuss.    Pt discussed his physical health when he was around his mother. Pt reported mom was healthy. Pt endorsed mom took care of herself. Pt reported admiring her for that. Pt denied getting sick often. Pt expressed value taking care of himself.     Goals: work on relationship issues, behavioral  issues, effective communication, cope with stress   Current symptoms:  Depression: insomnia, fatigue, and difficulty concentrating.  Anxiety: denies.   Panic Attacks: n/a  Sleep: Pt reported taking medication.  Flashbacks/nightmares: n/a  Daxa:  denies.  Psychosis: denies .  Hygiene: Pt denied issues.  Appetite: Pt reported hasn't been good in the past 6 months.  Motivation/Energy: Pt reported better.   Sobriety: Pt reported no relapse. Pt reported still watch porn, however hasn't hooked up anyone.   Cravings: 4/10  Pt endorsed urges a few times. Pt reported eating more sugar.   Relapses: Pt denied.  Triggers: n/a  Recovery efforts: Pt reported staying busy, work, appointments, and watching educational stuff.  Therapeutic Intervention/Techniques: behavior modification, insight oriented, and supportive; relevant to diagnosis, patient responds to this modality    Will continue to follow.   Pt aware to contact LPC for any additional needs that may occur prior to next session.    Risk Parameters:  Patient reports no suicidal ideation  Patient reports no homicidal ideation  Patient reports no self-injurious behavior  Patient reports no violent behavior    Diagnosis:   1. Depression, unspecified depression type            Return to Clinic: 3 weeks  Counseling time: 45  -Call to report any worsening of symptoms or problems associated with medication.  - Pt instructed to go to ER if thoughts of harming self or others arise.   -Supportive therapy and psychoeducation provided  -Pt instructed to call clinic, 911 or go to nearest emergency room if sxs worsen or pt is in crisis. The pt expresses understanding.   Each patient to whom he or she provides medical services by telemedicine is:  (1) informed of the relationship between the physician and patient and the respective role of any other health care provider with respect to management of the patient; and (2) notified that he or she may decline to receive medical services by  telemedicine and may withdraw from such care at any time.

## 2025-07-30 DIAGNOSIS — N20.0 KIDNEY STONES: ICD-10-CM

## 2025-07-30 RX ORDER — ALLOPURINOL 300 MG/1
300 TABLET ORAL
Qty: 90 TABLET | Refills: 2 | Status: SHIPPED | OUTPATIENT
Start: 2025-07-30

## 2025-07-30 NOTE — TELEPHONE ENCOUNTER
Refill Decision Note   Trevor Johnston  is requesting a refill authorization.  Brief Assessment and Rationale for Refill:  Approve     Medication Therapy Plan:  FLOS; KEEGAN      Comments:     Note composed:3:03 PM 07/30/2025

## 2025-07-30 NOTE — TELEPHONE ENCOUNTER
Care Due:                  Date            Visit Type   Department     Provider  --------------------------------------------------------------------------------                                EP -                              PRIMARY      [facility] Blanca Atkinsonyosef  Last Visit: 05-      CARE ([facility])   MEDICINE       Anger                              EP -                              PRIMARY      [facility]    Blanca Brownlee Nader  Next Visit: 11-      CARE ([facility])   MEDICINE       Anger                                                            Last  Test          Frequency    Reason                     Performed    Due Date  --------------------------------------------------------------------------------    HBA1C.......  6 months...  empagliflozin, metFORMIN.  04-   10-    [facility] Embedded Care Due Messages. Reference number: [id number].   7/30/2025 11:38:45 AM CDT

## 2025-08-05 ENCOUNTER — CLINICAL SUPPORT (OUTPATIENT)
Dept: PSYCHIATRY | Facility: CLINIC | Age: 70
End: 2025-08-05
Payer: MEDICARE

## 2025-08-05 DIAGNOSIS — F32.A DEPRESSION, UNSPECIFIED DEPRESSION TYPE: Primary | ICD-10-CM

## 2025-08-05 PROCEDURE — 90834 PSYTX W PT 45 MINUTES: CPT | Mod: ,,, | Performed by: COUNSELOR

## 2025-08-26 ENCOUNTER — CLINICAL SUPPORT (OUTPATIENT)
Dept: PSYCHIATRY | Facility: CLINIC | Age: 70
End: 2025-08-26
Payer: MEDICARE

## 2025-08-26 DIAGNOSIS — F32.A DEPRESSION, UNSPECIFIED DEPRESSION TYPE: Primary | ICD-10-CM

## 2025-08-26 PROCEDURE — 90837 PSYTX W PT 60 MINUTES: CPT | Mod: ,,, | Performed by: COUNSELOR

## 2025-08-29 DIAGNOSIS — E11.69 TYPE 2 DIABETES MELLITUS WITH OTHER SPECIFIED COMPLICATION, WITHOUT LONG-TERM CURRENT USE OF INSULIN: ICD-10-CM

## 2025-08-29 RX ORDER — METFORMIN HYDROCHLORIDE 500 MG/1
TABLET, EXTENDED RELEASE ORAL
Refills: 0 | OUTPATIENT
Start: 2025-08-29